# Patient Record
Sex: MALE | Race: WHITE | Employment: OTHER | URBAN - METROPOLITAN AREA
[De-identification: names, ages, dates, MRNs, and addresses within clinical notes are randomized per-mention and may not be internally consistent; named-entity substitution may affect disease eponyms.]

---

## 2024-01-01 ENCOUNTER — OUTSIDE SERVICES (OUTPATIENT)
Dept: INTERNAL MEDICINE CLINIC | Age: 74
End: 2024-01-01
Payer: OTHER GOVERNMENT

## 2024-01-01 DIAGNOSIS — E78.5 HYPERLIPIDEMIA, UNSPECIFIED HYPERLIPIDEMIA TYPE: ICD-10-CM

## 2024-01-01 DIAGNOSIS — N18.6 END STAGE RENAL DISEASE (HCC): ICD-10-CM

## 2024-01-01 DIAGNOSIS — F41.9 ANXIETY DISORDER, UNSPECIFIED TYPE: ICD-10-CM

## 2024-01-01 DIAGNOSIS — I25.5 ISCHEMIC CARDIOMYOPATHY: ICD-10-CM

## 2024-01-01 DIAGNOSIS — I47.0 RE-ENTRY VENTRICULAR ARRHYTHMIA (HCC): Primary | ICD-10-CM

## 2024-01-01 DIAGNOSIS — J44.9 CHRONIC OBSTRUCTIVE PULMONARY DISEASE, UNSPECIFIED COPD TYPE (HCC): ICD-10-CM

## 2024-01-01 DIAGNOSIS — D64.9 ANEMIA, UNSPECIFIED TYPE: ICD-10-CM

## 2024-01-01 DIAGNOSIS — I25.110 ATHSCL HEART DISEASE OF NATIVE COR ART W UNSTABLE ANG PCTRS (HCC): ICD-10-CM

## 2024-01-01 DIAGNOSIS — F33.1 MAJOR DEPRESSIVE DISORDER, RECURRENT, MODERATE (HCC): ICD-10-CM

## 2024-01-01 DIAGNOSIS — K21.9 GASTRO-ESOPHAGEAL REFLUX DISEASE WITHOUT ESOPHAGITIS: ICD-10-CM

## 2024-01-01 DIAGNOSIS — E11.8 TYPE 2 DIABETES MELLITUS WITH UNSPECIFIED COMPLICATIONS (HCC): ICD-10-CM

## 2024-01-01 PROCEDURE — 99305 1ST NF CARE MODERATE MDM 35: CPT | Performed by: INTERNAL MEDICINE

## 2024-08-17 ENCOUNTER — HOSPITAL ENCOUNTER (INPATIENT)
Age: 74
LOS: 1 days | Discharge: HOME | End: 2024-08-17
Attending: EMERGENCY MEDICINE | Admitting: INTERNAL MEDICINE
Payer: MEDICARE

## 2024-08-17 ENCOUNTER — APPOINTMENT (OUTPATIENT)
Dept: CT IMAGING | Age: 74
End: 2024-08-17
Payer: MEDICARE

## 2024-08-17 VITALS
SYSTOLIC BLOOD PRESSURE: 182 MMHG | TEMPERATURE: 98.7 F | HEART RATE: 103 BPM | WEIGHT: 140 LBS | DIASTOLIC BLOOD PRESSURE: 92 MMHG | RESPIRATION RATE: 23 BRPM | OXYGEN SATURATION: 100 %

## 2024-08-17 DIAGNOSIS — R79.89 ELEVATED TROPONIN: ICD-10-CM

## 2024-08-17 DIAGNOSIS — I50.9 ACUTE CONGESTIVE HEART FAILURE, UNSPECIFIED HEART FAILURE TYPE (HCC): Primary | ICD-10-CM

## 2024-08-17 DIAGNOSIS — R06.02 SHORTNESS OF BREATH: ICD-10-CM

## 2024-08-17 DIAGNOSIS — N17.9 ACUTE RENAL FAILURE, UNSPECIFIED ACUTE RENAL FAILURE TYPE (HCC): ICD-10-CM

## 2024-08-17 LAB
ANION GAP SERPL CALCULATED.3IONS-SCNC: 11 MMOL/L (ref 7–16)
BASOPHILS # BLD: 0.05 K/UL (ref 0–0.2)
BASOPHILS NFR BLD: 1 % (ref 0–2)
BNP SERPL-MCNC: ABNORMAL PG/ML (ref 0–450)
BUN SERPL-MCNC: 30 MG/DL (ref 6–23)
CALCIUM SERPL-MCNC: 9.2 MG/DL (ref 8.6–10.2)
CHLORIDE SERPL-SCNC: 104 MMOL/L (ref 98–107)
CO2 SERPL-SCNC: 23 MMOL/L (ref 22–29)
CREAT SERPL-MCNC: 4.4 MG/DL (ref 0.7–1.2)
D-DIMER QUANTITATIVE: 1717 NG/ML DDU (ref 0–230)
EOSINOPHIL # BLD: 0.21 K/UL (ref 0.05–0.5)
EOSINOPHILS RELATIVE PERCENT: 3 % (ref 0–6)
ERYTHROCYTE [DISTWIDTH] IN BLOOD BY AUTOMATED COUNT: 14.4 % (ref 11.5–15)
GFR, ESTIMATED: 13 ML/MIN/1.73M2
GLUCOSE SERPL-MCNC: 253 MG/DL (ref 74–99)
HCT VFR BLD AUTO: 42.9 % (ref 37–54)
HGB BLD-MCNC: 13.8 G/DL (ref 12.5–16.5)
IMM GRANULOCYTES # BLD AUTO: <0.03 K/UL (ref 0–0.58)
IMM GRANULOCYTES NFR BLD: 0 % (ref 0–5)
LYMPHOCYTES NFR BLD: 0.68 K/UL (ref 1.5–4)
LYMPHOCYTES RELATIVE PERCENT: 10 % (ref 20–42)
MCH RBC QN AUTO: 29.4 PG (ref 26–35)
MCHC RBC AUTO-ENTMCNC: 32.2 G/DL (ref 32–34.5)
MCV RBC AUTO: 91.5 FL (ref 80–99.9)
MONOCYTES NFR BLD: 0.41 K/UL (ref 0.1–0.95)
MONOCYTES NFR BLD: 6 % (ref 2–12)
NEUTROPHILS NFR BLD: 81 % (ref 43–80)
NEUTS SEG NFR BLD: 5.6 K/UL (ref 1.8–7.3)
PLATELET # BLD AUTO: 255 K/UL (ref 130–450)
PMV BLD AUTO: 9.4 FL (ref 7–12)
POTASSIUM SERPL-SCNC: 4.5 MMOL/L (ref 3.5–5)
RBC # BLD AUTO: 4.69 M/UL (ref 3.8–5.8)
SARS-COV-2 RDRP RESP QL NAA+PROBE: NOT DETECTED
SODIUM SERPL-SCNC: 138 MMOL/L (ref 132–146)
SPECIMEN DESCRIPTION: NORMAL
TROPONIN I SERPL HS-MCNC: 81 NG/L (ref 0–11)
TROPONIN I SERPL HS-MCNC: 90 NG/L (ref 0–11)
WBC OTHER # BLD: 7 K/UL (ref 4.5–11.5)

## 2024-08-17 PROCEDURE — 96360 HYDRATION IV INFUSION INIT: CPT

## 2024-08-17 PROCEDURE — 71275 CT ANGIOGRAPHY CHEST: CPT

## 2024-08-17 PROCEDURE — 85025 COMPLETE CBC W/AUTO DIFF WBC: CPT

## 2024-08-17 PROCEDURE — 80048 BASIC METABOLIC PNL TOTAL CA: CPT

## 2024-08-17 PROCEDURE — 1200000000 HC SEMI PRIVATE

## 2024-08-17 PROCEDURE — 99285 EMERGENCY DEPT VISIT HI MDM: CPT

## 2024-08-17 PROCEDURE — 96361 HYDRATE IV INFUSION ADD-ON: CPT

## 2024-08-17 PROCEDURE — 36415 COLL VENOUS BLD VENIPUNCTURE: CPT

## 2024-08-17 PROCEDURE — 6360000004 HC RX CONTRAST MEDICATION: Performed by: RADIOLOGY

## 2024-08-17 PROCEDURE — 84484 ASSAY OF TROPONIN QUANT: CPT

## 2024-08-17 PROCEDURE — 93005 ELECTROCARDIOGRAM TRACING: CPT | Performed by: EMERGENCY MEDICINE

## 2024-08-17 PROCEDURE — 85379 FIBRIN DEGRADATION QUANT: CPT

## 2024-08-17 PROCEDURE — 87635 SARS-COV-2 COVID-19 AMP PRB: CPT

## 2024-08-17 PROCEDURE — 83880 ASSAY OF NATRIURETIC PEPTIDE: CPT

## 2024-08-17 PROCEDURE — 2580000003 HC RX 258: Performed by: EMERGENCY MEDICINE

## 2024-08-17 RX ORDER — ACETAMINOPHEN 500 MG
500 TABLET ORAL EVERY 6 HOURS PRN
Status: CANCELLED | OUTPATIENT
Start: 2024-08-17

## 2024-08-17 RX ORDER — 0.9 % SODIUM CHLORIDE 0.9 %
1000 INTRAVENOUS SOLUTION INTRAVENOUS ONCE
Status: COMPLETED | OUTPATIENT
Start: 2024-08-17 | End: 2024-08-17

## 2024-08-17 RX ORDER — POLYETHYLENE GLYCOL 3350 17 G/17G
17 POWDER, FOR SOLUTION ORAL DAILY PRN
Status: CANCELLED | OUTPATIENT
Start: 2024-08-17

## 2024-08-17 RX ORDER — BUDESONIDE 0.5 MG/2ML
500 INHALANT ORAL
Status: CANCELLED | OUTPATIENT
Start: 2024-08-17

## 2024-08-17 RX ORDER — HEPARIN SODIUM 5000 [USP'U]/ML
5000 INJECTION, SOLUTION INTRAVENOUS; SUBCUTANEOUS EVERY 8 HOURS SCHEDULED
Status: CANCELLED | OUTPATIENT
Start: 2024-08-17

## 2024-08-17 RX ORDER — SODIUM CHLORIDE 9 MG/ML
INJECTION, SOLUTION INTRAVENOUS CONTINUOUS
Status: CANCELLED | OUTPATIENT
Start: 2024-08-17

## 2024-08-17 RX ORDER — IOPAMIDOL 755 MG/ML
75 INJECTION, SOLUTION INTRAVASCULAR
Status: COMPLETED | OUTPATIENT
Start: 2024-08-17 | End: 2024-08-17

## 2024-08-17 RX ORDER — PROCHLORPERAZINE EDISYLATE 5 MG/ML
10 INJECTION INTRAMUSCULAR; INTRAVENOUS EVERY 6 HOURS PRN
Status: CANCELLED | OUTPATIENT
Start: 2024-08-17

## 2024-08-17 RX ORDER — IPRATROPIUM BROMIDE AND ALBUTEROL SULFATE 2.5; .5 MG/3ML; MG/3ML
1 SOLUTION RESPIRATORY (INHALATION)
Status: CANCELLED | OUTPATIENT
Start: 2024-08-17

## 2024-08-17 RX ADMIN — IOPAMIDOL 75 ML: 755 INJECTION, SOLUTION INTRAVENOUS at 13:25

## 2024-08-17 RX ADMIN — SODIUM CHLORIDE 1000 ML: 9 INJECTION, SOLUTION INTRAVENOUS at 12:12

## 2024-08-17 ASSESSMENT — LIFESTYLE VARIABLES
HOW MANY STANDARD DRINKS CONTAINING ALCOHOL DO YOU HAVE ON A TYPICAL DAY: PATIENT DOES NOT DRINK
HOW OFTEN DO YOU HAVE A DRINK CONTAINING ALCOHOL: NEVER

## 2024-08-17 NOTE — ED NOTES
Name: Deion Knight  : 1950  MRN: [id number]    Date:  24    Time: 1:03 PM EDT    Benefits of immediately proceeding with Radiology exam(s) without pre-testing outweigh the risks or are not indicated as specified below and therefore the following is/are being waived:    [] Pregnancy test   [] Patients LMP on-time and regular.   [] Patient had Tubal Ligation or has other Contraception Device.   [] Patient  is Menopausal or Premenarcheal.    [] Patient had Full or Partial Hysterectomy.    [] Protocol for Iodine allergy    [] MRI Questionnaire     [x] BUN/Creatinine   [] Patient age w/no hx of renal dysfunction.   [] Patient on Dialysis.   [] Recent Normal Labs.  Electronically signed by Minoo Carvajal DO on 24 at 1:03 PM EDT               Minoo Carvajal DO  [phone]

## 2024-08-17 NOTE — ED PROVIDER NOTES
[facility] EMERGENCY DEPARTMENT  EMERGENCY DEPARTMENT ENCOUNTER        Pt Name: Deion Knight  MRN: [id number]  Birthdate 1950  Date of evaluation: 8/17/2024  Provider: Minoo Carvajal DO  PCP: No primary care provider on file.  Note Started: 2:26 PM EDT 8/17/24    CHIEF COMPLAINT       Chief Complaint   Patient presents with    Shortness of Breath     Pt complaint of SOB when he lies down. Also SOB upon exertion. Started getting worse approx 1 month ago.       HISTORY OF PRESENT ILLNESS: 1 or more Elements        Limitations to history : None    Deion Knight is a 74 y.o. male who presents for evaluation of shortness of breath that initially began a month ago.  He notes dyspnea on exertion as well as orthopnea.  Denies chest pain.  Denies peripheral edema.  Denies any nausea or vomiting.  Denies fever, chills or URI symptoms.      Nursing Notes were all reviewed and agreed with or any disagreements were addressed in the HPI.      REVIEW OF EXTERNAL NOTE :       [facility]      Chart Review/External Note Review    Last Echo reviewed by Me:  No results found for: \"LVEF\", \"LVEFMODE\"          Controlled Substance Monitoring:    Acute and Chronic Pain Monitoring:        No data to display                    REVIEW OF SYSTEMS :      Positives and Pertinent negatives as per HPI.     SURGICAL HISTORY   No past surgical history on file.    CURRENTMEDICATIONS       Previous Medications    No medications on file       ALLERGIES     Patient has no known allergies.    FAMILYHISTORY     No family history on file.     SOCIAL HISTORY          SCREENINGS        [place] Coma Scale  Eye Opening: Spontaneous  Best Verbal Response: Oriented  Best Motor Response: Obeys commands  Luis Coma Scale Score: 15                CIWA Assessment  BP: (!) 182/92  Pulse: (!) 103           PHYSICAL EXAM  1 or more Elements     ED Triage Vitals   BP Systolic BP Percentile Diastolic BP

## 2024-08-18 LAB
EKG ATRIAL RATE: 108 BPM
EKG P AXIS: 88 DEGREES
EKG P-R INTERVAL: 144 MS
EKG Q-T INTERVAL: 350 MS
EKG QRS DURATION: 96 MS
EKG QTC CALCULATION (BAZETT): 469 MS
EKG R AXIS: -4 DEGREES
EKG T AXIS: 131 DEGREES
EKG VENTRICULAR RATE: 108 BPM

## 2024-08-18 PROCEDURE — 93010 ELECTROCARDIOGRAM REPORT: CPT | Performed by: INTERNAL MEDICINE

## 2024-08-19 ENCOUNTER — HOSPITAL ENCOUNTER (INPATIENT)
Age: 74
LOS: 5 days | Discharge: LEFT BEFORE DISCHARGE | End: 2024-08-24
Attending: EMERGENCY MEDICINE | Admitting: INTERNAL MEDICINE
Payer: OTHER GOVERNMENT

## 2024-08-19 ENCOUNTER — APPOINTMENT (OUTPATIENT)
Dept: CT IMAGING | Age: 74
End: 2024-08-19
Payer: OTHER GOVERNMENT

## 2024-08-19 ENCOUNTER — APPOINTMENT (OUTPATIENT)
Dept: GENERAL RADIOLOGY | Age: 74
End: 2024-08-19
Payer: OTHER GOVERNMENT

## 2024-08-19 DIAGNOSIS — R06.02 SHORTNESS OF BREATH: Primary | ICD-10-CM

## 2024-08-19 DIAGNOSIS — N28.9 ACUTE RENAL INSUFFICIENCY: ICD-10-CM

## 2024-08-19 DIAGNOSIS — E87.5 HYPERKALEMIA: ICD-10-CM

## 2024-08-19 DIAGNOSIS — J90 PLEURAL EFFUSION, BILATERAL: ICD-10-CM

## 2024-08-19 PROBLEM — N17.9 ACUTE RENAL FAILURE (ARF) (HCC): Status: ACTIVE | Noted: 2024-08-19

## 2024-08-19 LAB
ANION GAP SERPL CALCULATED.3IONS-SCNC: 12 MMOL/L (ref 7–16)
BNP SERPL-MCNC: ABNORMAL PG/ML (ref 0–450)
BUN SERPL-MCNC: 29 MG/DL (ref 6–23)
CALCIUM SERPL-MCNC: 8.8 MG/DL (ref 8.6–10.2)
CHLORIDE SERPL-SCNC: 103 MMOL/L (ref 98–107)
CO2 SERPL-SCNC: 22 MMOL/L (ref 22–29)
CREAT SERPL-MCNC: 4.2 MG/DL (ref 0.7–1.2)
CRP SERPL HS-MCNC: 13 MG/L (ref 0–5)
EKG ATRIAL RATE: 97 BPM
EKG P AXIS: 82 DEGREES
EKG P-R INTERVAL: 140 MS
EKG Q-T INTERVAL: 350 MS
EKG QRS DURATION: 98 MS
EKG QTC CALCULATION (BAZETT): 444 MS
EKG R AXIS: 17 DEGREES
EKG T AXIS: -171 DEGREES
EKG VENTRICULAR RATE: 97 BPM
ERYTHROCYTE [DISTWIDTH] IN BLOOD BY AUTOMATED COUNT: 14.3 % (ref 11.5–15)
GFR, ESTIMATED: 14 ML/MIN/1.73M2
GLUCOSE BLD-MCNC: 141 MG/DL (ref 74–99)
GLUCOSE BLD-MCNC: 183 MG/DL (ref 74–99)
GLUCOSE SERPL-MCNC: 165 MG/DL (ref 74–99)
HBA1C MFR BLD: 7.4 % (ref 4–5.6)
HCT VFR BLD AUTO: 40.8 % (ref 37–54)
HGB BLD-MCNC: 13 G/DL (ref 12.5–16.5)
LACTATE BLDV-SCNC: 0.9 MMOL/L (ref 0.5–2.2)
MCH RBC QN AUTO: 29.7 PG (ref 26–35)
MCHC RBC AUTO-ENTMCNC: 31.9 G/DL (ref 32–34.5)
MCV RBC AUTO: 93.4 FL (ref 80–99.9)
PLATELET # BLD AUTO: 264 K/UL (ref 130–450)
PMV BLD AUTO: 9.9 FL (ref 7–12)
POTASSIUM SERPL-SCNC: 5.3 MMOL/L (ref 3.5–5)
PROCALCITONIN SERPL-MCNC: 0.12 NG/ML (ref 0–0.08)
RBC # BLD AUTO: 4.37 M/UL (ref 3.8–5.8)
SODIUM SERPL-SCNC: 137 MMOL/L (ref 132–146)
TROPONIN I SERPL HS-MCNC: 103 NG/L (ref 0–11)
WBC OTHER # BLD: 6.3 K/UL (ref 4.5–11.5)

## 2024-08-19 PROCEDURE — 74176 CT ABD & PELVIS W/O CONTRAST: CPT

## 2024-08-19 PROCEDURE — 86140 C-REACTIVE PROTEIN: CPT

## 2024-08-19 PROCEDURE — 6370000000 HC RX 637 (ALT 250 FOR IP): Performed by: INTERNAL MEDICINE

## 2024-08-19 PROCEDURE — 85027 COMPLETE CBC AUTOMATED: CPT

## 2024-08-19 PROCEDURE — 83036 HEMOGLOBIN GLYCOSYLATED A1C: CPT

## 2024-08-19 PROCEDURE — 1200000000 HC SEMI PRIVATE

## 2024-08-19 PROCEDURE — 82962 GLUCOSE BLOOD TEST: CPT

## 2024-08-19 PROCEDURE — 84145 PROCALCITONIN (PCT): CPT

## 2024-08-19 PROCEDURE — 80048 BASIC METABOLIC PNL TOTAL CA: CPT

## 2024-08-19 PROCEDURE — 93010 ELECTROCARDIOGRAM REPORT: CPT | Performed by: INTERNAL MEDICINE

## 2024-08-19 PROCEDURE — 6360000004 HC RX CONTRAST MEDICATION: Performed by: RADIOLOGY

## 2024-08-19 PROCEDURE — 6360000002 HC RX W HCPCS: Performed by: INTERNAL MEDICINE

## 2024-08-19 PROCEDURE — 93005 ELECTROCARDIOGRAM TRACING: CPT | Performed by: EMERGENCY MEDICINE

## 2024-08-19 PROCEDURE — 83605 ASSAY OF LACTIC ACID: CPT

## 2024-08-19 PROCEDURE — 71046 X-RAY EXAM CHEST 2 VIEWS: CPT

## 2024-08-19 PROCEDURE — 99285 EMERGENCY DEPT VISIT HI MDM: CPT

## 2024-08-19 PROCEDURE — 83880 ASSAY OF NATRIURETIC PEPTIDE: CPT

## 2024-08-19 PROCEDURE — 94640 AIRWAY INHALATION TREATMENT: CPT

## 2024-08-19 PROCEDURE — 84484 ASSAY OF TROPONIN QUANT: CPT

## 2024-08-19 PROCEDURE — 36415 COLL VENOUS BLD VENIPUNCTURE: CPT

## 2024-08-19 PROCEDURE — 2500000003 HC RX 250 WO HCPCS: Performed by: EMERGENCY MEDICINE

## 2024-08-19 RX ORDER — BUDESONIDE AND FORMOTEROL FUMARATE DIHYDRATE 80; 4.5 UG/1; UG/1
2 AEROSOL RESPIRATORY (INHALATION) 2 TIMES DAILY
COMMUNITY
End: 2024-08-19 | Stop reason: DRUGHIGH

## 2024-08-19 RX ORDER — GLUCAGON 1 MG/ML
1 KIT INJECTION PRN
Status: DISCONTINUED | OUTPATIENT
Start: 2024-08-19 | End: 2024-08-24 | Stop reason: HOSPADM

## 2024-08-19 RX ORDER — ALBUTEROL SULFATE 90 UG/1
2 AEROSOL, METERED RESPIRATORY (INHALATION) EVERY 6 HOURS PRN
Status: ON HOLD | COMMUNITY

## 2024-08-19 RX ORDER — METOPROLOL SUCCINATE 50 MG/1
50 TABLET, EXTENDED RELEASE ORAL DAILY
Status: ON HOLD | COMMUNITY

## 2024-08-19 RX ORDER — BUPROPION HYDROCHLORIDE 300 MG/1
300 TABLET ORAL EVERY MORNING
Status: ON HOLD | COMMUNITY

## 2024-08-19 RX ORDER — PANTOPRAZOLE SODIUM 40 MG/1
40 TABLET, DELAYED RELEASE ORAL DAILY
Status: ON HOLD | COMMUNITY

## 2024-08-19 RX ORDER — POLYETHYLENE GLYCOL 3350 17 G/17G
17 POWDER, FOR SOLUTION ORAL DAILY PRN
Status: DISCONTINUED | OUTPATIENT
Start: 2024-08-19 | End: 2024-08-24 | Stop reason: HOSPADM

## 2024-08-19 RX ORDER — ATORVASTATIN CALCIUM 40 MG/1
80 TABLET, FILM COATED ORAL NIGHTLY
Status: DISCONTINUED | OUTPATIENT
Start: 2024-08-19 | End: 2024-08-24 | Stop reason: HOSPADM

## 2024-08-19 RX ORDER — ALBUTEROL SULFATE 0.63 MG/3ML
1 SOLUTION RESPIRATORY (INHALATION) EVERY 6 HOURS PRN
COMMUNITY
End: 2024-08-19 | Stop reason: ALTCHOICE

## 2024-08-19 RX ORDER — BUDESONIDE AND FORMOTEROL FUMARATE DIHYDRATE 160; 4.5 UG/1; UG/1
1 AEROSOL RESPIRATORY (INHALATION) 2 TIMES DAILY
Status: ON HOLD | COMMUNITY

## 2024-08-19 RX ORDER — ASPIRIN 81 MG/1
81 TABLET ORAL DAILY
Status: ON HOLD | COMMUNITY

## 2024-08-19 RX ORDER — M-VIT,TX,IRON,MINS/CALC/FOLIC 27MG-0.4MG
1 TABLET ORAL DAILY
Status: ON HOLD | COMMUNITY

## 2024-08-19 RX ORDER — BUDESONIDE 0.5 MG/2ML
500 INHALANT ORAL
Status: DISCONTINUED | OUTPATIENT
Start: 2024-08-19 | End: 2024-08-24 | Stop reason: HOSPADM

## 2024-08-19 RX ORDER — HEPARIN SODIUM 5000 [USP'U]/ML
5000 INJECTION, SOLUTION INTRAVENOUS; SUBCUTANEOUS EVERY 8 HOURS SCHEDULED
Status: DISCONTINUED | OUTPATIENT
Start: 2024-08-19 | End: 2024-08-24 | Stop reason: HOSPADM

## 2024-08-19 RX ORDER — BUPROPION HYDROCHLORIDE 150 MG/1
300 TABLET ORAL EVERY MORNING
Status: DISCONTINUED | OUTPATIENT
Start: 2024-08-20 | End: 2024-08-24 | Stop reason: HOSPADM

## 2024-08-19 RX ORDER — ISOSORBIDE MONONITRATE 30 MG/1
30 TABLET, EXTENDED RELEASE ORAL DAILY
Status: DISCONTINUED | OUTPATIENT
Start: 2024-08-19 | End: 2024-08-24 | Stop reason: HOSPADM

## 2024-08-19 RX ORDER — DEXTROSE MONOHYDRATE 100 MG/ML
INJECTION, SOLUTION INTRAVENOUS CONTINUOUS PRN
Status: DISCONTINUED | OUTPATIENT
Start: 2024-08-19 | End: 2024-08-24 | Stop reason: HOSPADM

## 2024-08-19 RX ORDER — ACETAMINOPHEN 500 MG
500 TABLET ORAL EVERY 6 HOURS PRN
Status: DISCONTINUED | OUTPATIENT
Start: 2024-08-19 | End: 2024-08-24 | Stop reason: HOSPADM

## 2024-08-19 RX ORDER — INSULIN LISPRO 100 [IU]/ML
0-4 INJECTION, SOLUTION INTRAVENOUS; SUBCUTANEOUS NIGHTLY
Status: DISCONTINUED | OUTPATIENT
Start: 2024-08-19 | End: 2024-08-24 | Stop reason: HOSPADM

## 2024-08-19 RX ORDER — METOPROLOL SUCCINATE 50 MG/1
50 TABLET, EXTENDED RELEASE ORAL DAILY
Status: DISCONTINUED | OUTPATIENT
Start: 2024-08-19 | End: 2024-08-24 | Stop reason: HOSPADM

## 2024-08-19 RX ORDER — ATORVASTATIN CALCIUM 80 MG/1
80 TABLET, FILM COATED ORAL DAILY
Status: ON HOLD | COMMUNITY

## 2024-08-19 RX ORDER — INSULIN LISPRO 100 [IU]/ML
0-8 INJECTION, SOLUTION INTRAVENOUS; SUBCUTANEOUS
Status: DISCONTINUED | OUTPATIENT
Start: 2024-08-19 | End: 2024-08-24 | Stop reason: HOSPADM

## 2024-08-19 RX ORDER — ASPIRIN 81 MG/1
81 TABLET ORAL DAILY
Status: DISCONTINUED | OUTPATIENT
Start: 2024-08-19 | End: 2024-08-24 | Stop reason: HOSPADM

## 2024-08-19 RX ORDER — PROCHLORPERAZINE EDISYLATE 5 MG/ML
5 INJECTION INTRAMUSCULAR; INTRAVENOUS EVERY 6 HOURS PRN
Status: DISCONTINUED | OUTPATIENT
Start: 2024-08-19 | End: 2024-08-24 | Stop reason: HOSPADM

## 2024-08-19 RX ORDER — IPRATROPIUM BROMIDE AND ALBUTEROL SULFATE 2.5; .5 MG/3ML; MG/3ML
1 SOLUTION RESPIRATORY (INHALATION)
Status: DISCONTINUED | OUTPATIENT
Start: 2024-08-19 | End: 2024-08-24 | Stop reason: HOSPADM

## 2024-08-19 RX ORDER — EZETIMIBE 10 MG/1
10 TABLET ORAL DAILY
Status: ON HOLD | COMMUNITY

## 2024-08-19 RX ORDER — CALCIUM GLUCONATE 20 MG/ML
1000 INJECTION, SOLUTION INTRAVENOUS ONCE
Status: COMPLETED | OUTPATIENT
Start: 2024-08-19 | End: 2024-08-19

## 2024-08-19 RX ORDER — PANTOPRAZOLE SODIUM 40 MG/1
40 TABLET, DELAYED RELEASE ORAL
Status: DISCONTINUED | OUTPATIENT
Start: 2024-08-20 | End: 2024-08-24 | Stop reason: HOSPADM

## 2024-08-19 RX ORDER — ATORVASTATIN CALCIUM 40 MG/1
40 TABLET, FILM COATED ORAL NIGHTLY
Status: DISCONTINUED | OUTPATIENT
Start: 2024-08-19 | End: 2024-08-19

## 2024-08-19 RX ORDER — ISOSORBIDE MONONITRATE 30 MG/1
30 TABLET, EXTENDED RELEASE ORAL DAILY
Status: ON HOLD | COMMUNITY

## 2024-08-19 RX ADMIN — BUDESONIDE 500 MCG: 0.5 SUSPENSION RESPIRATORY (INHALATION) at 17:16

## 2024-08-19 RX ADMIN — ASPIRIN 81 MG: 81 TABLET, COATED ORAL at 17:03

## 2024-08-19 RX ADMIN — IOPAMIDOL 18 ML: 755 INJECTION, SOLUTION INTRAVENOUS at 18:50

## 2024-08-19 RX ADMIN — ATORVASTATIN CALCIUM 80 MG: 40 TABLET, FILM COATED ORAL at 21:47

## 2024-08-19 RX ADMIN — ISOSORBIDE MONONITRATE 30 MG: 30 TABLET, EXTENDED RELEASE ORAL at 21:47

## 2024-08-19 RX ADMIN — METOPROLOL SUCCINATE 50 MG: 50 TABLET, EXTENDED RELEASE ORAL at 21:47

## 2024-08-19 RX ADMIN — HEPARIN SODIUM 5000 UNITS: 5000 INJECTION INTRAVENOUS; SUBCUTANEOUS at 21:47

## 2024-08-19 RX ADMIN — IPRATROPIUM BROMIDE AND ALBUTEROL SULFATE 1 DOSE: .5; 2.5 SOLUTION RESPIRATORY (INHALATION) at 17:16

## 2024-08-19 RX ADMIN — CALCIUM GLUCONATE 1000 MG: 20 INJECTION, SOLUTION INTRAVENOUS at 15:41

## 2024-08-19 RX ADMIN — HEPARIN SODIUM 5000 UNITS: 5000 INJECTION INTRAVENOUS; SUBCUTANEOUS at 17:03

## 2024-08-19 ASSESSMENT — PAIN SCALES - GENERAL
PAINLEVEL_OUTOF10: 0
PAINLEVEL_OUTOF10: 0

## 2024-08-19 ASSESSMENT — ENCOUNTER SYMPTOMS
COUGH: 0
ABDOMINAL PAIN: 0
SHORTNESS OF BREATH: 1
CHEST TIGHTNESS: 0
WHEEZING: 0

## 2024-08-19 ASSESSMENT — PAIN - FUNCTIONAL ASSESSMENT: PAIN_FUNCTIONAL_ASSESSMENT: 0-10

## 2024-08-19 ASSESSMENT — LIFESTYLE VARIABLES: HOW OFTEN DO YOU HAVE A DRINK CONTAINING ALCOHOL: NEVER

## 2024-08-19 NOTE — ED PROVIDER NOTES
Patient presents emergency department with complaint of continued shortness of breath with exertion.  Patient was seen in the ED last week and was diagnosed with acute renal insufficiency and congestive heart failure.  Patient states that he cannot stay due to obligations at home and therefore left.  Today he states he has his affairs in order [facility].  Patient is agreeable with the plan.    The history is provided by the patient.       Review of Systems   Constitutional:  Positive for activity change and fatigue. Negative for chills and fever.   Respiratory:  Positive for shortness of breath. Negative for cough, chest tightness and wheezing.    Cardiovascular:  Negative for chest pain, palpitations and leg swelling.   Gastrointestinal:  Negative for abdominal pain.   Genitourinary:  Negative for flank pain.   Musculoskeletal:  Negative for arthralgias.   Neurological:  Negative for syncope and light-headedness.   Psychiatric/Behavioral: Negative.         Physical Exam  Vitals and nursing note reviewed.   Constitutional:       General: He is not in acute distress.     Appearance: He is well-developed and normal weight. He is not ill-appearing.   HENT:      Head: Normocephalic and atraumatic.   Eyes:      Pupils: Pupils are equal, round, and reactive to light.   Cardiovascular:      Rate and Rhythm: Normal rate and regular rhythm.      Heart sounds: Normal heart sounds. No murmur heard.  Pulmonary:      Effort: Pulmonary effort is normal. No respiratory distress.      Breath sounds: Normal breath sounds. No wheezing or rales.   Chest:      Chest wall: No tenderness.   Abdominal:      General: Bowel sounds are normal.      Palpations: Abdomen is soft.      Tenderness: There is no abdominal tenderness. There is no guarding or rebound.   Musculoskeletal:      Cervical back: Normal range of motion and neck supple.      Right lower leg: No tenderness. No edema.      Left

## 2024-08-19 NOTE — H&P
Department of Internal Medicine        CHIEF COMPLAINT:  + SOB with exertion, intermittent upper abdominal pain    Reason for Admission: Acute renal failure, CHF    HISTORY OF PRESENT ILLNESS:      The patient is a 74 y.o. male who presents with increased shortness of breath has been going on for about a month.  Patient was seen in the ED 2 days ago with the same symptoms of shortness of breath when he lays down and when he ambulates.  Patient said the symptoms going on for about a month.  He denies any chest pain or palpitations or dizziness.  He denies any fever/chills or productive cough.  He denies any history of heart problems.  Patient's BUN/creatinine and was elevated at 30/4.4 with.  No history of kidney disease in the past.  His proBNP was greater than 70,000.  Troponin was 81.  CBC was normal.  Patient random blood sugar though was also 253.  Patient signed out AMA but came back into the hospital because his [facility] physician[facility]nurse practitioner and told to come back in.  Patient states symptoms about the same with a WBC 6.3 and hemoglobin 13.0 today.  Pending repeat BMP today.  Temperature was 98 with a heart rate of 110 and blood pressure 94/78.  O2 sat 98% on room air at rest.  Chest x-ray was ordered and pending.  The patient did have a CTA of the lungs that on the 17th which showed no evidence of PE but did show bilateral pleural effusion.    Past Medical History:    History reviewed. No pertinent past medical history.  Past Surgical History:    History reviewed. No pertinent surgical history.    Medications Prior to Admission:    @  Prior to Admission medications    Medication Sig Start Date End Date Taking? Authorizing Provider   atorvastatin (LIPITOR) 40 MG tablet Take 1 tablet by mouth daily   Yes Debbie Mohan MD   albuterol (ACCUNEB) 0.63 MG/3ML nebulizer solution Take 3 mLs by nebulization every 6 hours as needed for Wheezing   Yes Debbie Mohan MD   budesonide-formoterol (BREYNA)

## 2024-08-19 NOTE — PROGRESS NOTES
4 Eyes Skin Assessment     NAME:  Deion Knight  YOB: 1950  MEDICAL RECORD NUMBER:  [id number]    The patient is being assessed for  Admission    I agree that at least one RN has performed a thorough Head to Toe Skin Assessment on the patient. ALL assessment sites listed below have been assessed.      Areas assessed by both nurses:    Head, Face, Ears, Shoulders, Back, Chest, Arms, Elbows, Hands, Sacrum. Buttock, Coccyx, Ischium, and Legs. Feet and Heels        Does the Patient have a Wound? No noted wound(s)       Conrad Prevention initiated by RN: Yes  Wound Care Orders initiated by RN: No    Pressure Injury (Stage 3,4, Unstageable, DTI, NWPT, and Complex wounds) if present, place Wound referral order by RN under : No    New Ostomies, if present place, Ostomy referral order under : No     Nurse 1 eSignature: Electronically signed by Hamlet Yi RN on 8/19/24 at 6:24 PM EDT    **SHARE this note so that the co-signing nurse can place an eSignature**    Nurse 2 eSignature: Electronically signed by SHA MURRAY RN on 8/19/24 at 6:41 PM EDT

## 2024-08-20 ENCOUNTER — APPOINTMENT (OUTPATIENT)
Age: 74
End: 2024-08-20
Attending: INTERNAL MEDICINE
Payer: OTHER GOVERNMENT

## 2024-08-20 LAB
ALBUMIN SERPL-MCNC: 2.4 G/DL (ref 3.5–5.2)
ALP SERPL-CCNC: 93 U/L (ref 40–129)
ALT SERPL-CCNC: 16 U/L (ref 0–40)
ANION GAP SERPL CALCULATED.3IONS-SCNC: 10 MMOL/L (ref 7–16)
AST SERPL-CCNC: 25 U/L (ref 0–39)
BASOPHILS # BLD: 0.04 K/UL (ref 0–0.2)
BASOPHILS NFR BLD: 1 % (ref 0–2)
BILIRUB SERPL-MCNC: 0.3 MG/DL (ref 0–1.2)
BUN SERPL-MCNC: 29 MG/DL (ref 6–23)
CALCIUM SERPL-MCNC: 8.4 MG/DL (ref 8.6–10.2)
CHLORIDE SERPL-SCNC: 107 MMOL/L (ref 98–107)
CHOLEST SERPL-MCNC: 183 MG/DL
CO2 SERPL-SCNC: 21 MMOL/L (ref 22–29)
CREAT SERPL-MCNC: 4.3 MG/DL (ref 0.7–1.2)
CREAT UR-MCNC: 112 MG/DL (ref 40–278)
ECHO AR MAX VEL PISA: 3.1 M/S
ECHO AV AREA PEAK VELOCITY: 1.3 CM2
ECHO AV AREA PEAK VELOCITY: 1.4 CM2
ECHO AV AREA PEAK VELOCITY: 1.7 CM2
ECHO AV AREA PEAK VELOCITY: 1.9 CM2
ECHO AV AREA VTI: 1.5 CM2
ECHO AV AREA/BSA VTI: 0.9 CM2/M2
ECHO AV CUSP MM: 1.7 CM
ECHO AV MEAN GRADIENT: 5 MMHG
ECHO AV MEAN VELOCITY: 1.1 M/S
ECHO AV PEAK GRADIENT: 11 MMHG
ECHO AV PEAK GRADIENT: 11 MMHG
ECHO AV PEAK VELOCITY: 1.5 M/S
ECHO AV PEAK VELOCITY: 1.6 M/S
ECHO AV REGURGITANT PHT: 1034.8 MILLISECOND
ECHO AV VTI: 35.8 CM
ECHO BSA: 1.75 M2
ECHO EST RA PRESSURE: 3 MMHG
ECHO LA DIAMETER INDEX: 2.05 CM/M2
ECHO LA DIAMETER: 3.6 CM
ECHO LA VOL A-L A2C: 84 ML (ref 18–58)
ECHO LA VOL A-L A4C: 66 ML (ref 18–58)
ECHO LA VOL BP: 75 ML (ref 18–58)
ECHO LA VOL MOD A2C: 78 ML (ref 18–58)
ECHO LA VOL MOD A4C: 62 ML (ref 18–58)
ECHO LA VOL/BSA BIPLANE: 43 ML/M2 (ref 16–34)
ECHO LA VOLUME AREA LENGTH: 81 ML
ECHO LA VOLUME INDEX A-L A2C: 48 ML/M2 (ref 16–34)
ECHO LA VOLUME INDEX A-L A4C: 38 ML/M2 (ref 16–34)
ECHO LA VOLUME INDEX AREA LENGTH: 46 ML/M2 (ref 16–34)
ECHO LA VOLUME INDEX MOD A2C: 44 ML/M2 (ref 16–34)
ECHO LA VOLUME INDEX MOD A4C: 35 ML/M2 (ref 16–34)
ECHO LV EDV A2C: 165 ML
ECHO LV EDV A4C: 176 ML
ECHO LV EDV BP: 174 ML (ref 67–155)
ECHO LV EDV INDEX A4C: 100 ML/M2
ECHO LV EDV INDEX BP: 99 ML/M2
ECHO LV EDV NDEX A2C: 94 ML/M2
ECHO LV EF PHYSICIAN: 15 %
ECHO LV EJECTION FRACTION A2C: 20 %
ECHO LV EJECTION FRACTION A4C: 29 %
ECHO LV EJECTION FRACTION BIPLANE: 25 % (ref 55–100)
ECHO LV ESV A2C: 132 ML
ECHO LV ESV A4C: 125 ML
ECHO LV ESV BP: 131 ML (ref 22–58)
ECHO LV ESV INDEX A2C: 75 ML/M2
ECHO LV ESV INDEX A4C: 71 ML/M2
ECHO LV ESV INDEX BP: 74 ML/M2
ECHO LV FRACTIONAL SHORTENING: 9 % (ref 28–44)
ECHO LV INTERNAL DIMENSION DIASTOLE INDEX: 3.64 CM/M2
ECHO LV INTERNAL DIMENSION DIASTOLIC: 6.4 CM (ref 4.2–5.9)
ECHO LV INTERNAL DIMENSION SYSTOLIC INDEX: 3.3 CM/M2
ECHO LV INTERNAL DIMENSION SYSTOLIC: 5.8 CM
ECHO LV IVSD: 1.1 CM (ref 0.6–1)
ECHO LV IVSS: 1.2 CM
ECHO LV MASS 2D: 330.4 G (ref 88–224)
ECHO LV MASS INDEX 2D: 187.7 G/M2 (ref 49–115)
ECHO LV POSTERIOR WALL DIASTOLIC: 1.2 CM (ref 0.6–1)
ECHO LV POSTERIOR WALL SYSTOLIC: 1.4 CM
ECHO LV RELATIVE WALL THICKNESS RATIO: 0.38
ECHO LVOT AREA: 3.1 CM2
ECHO LVOT AV VTI INDEX: 0.47
ECHO LVOT DIAM: 2 CM
ECHO LVOT MEAN GRADIENT: 1 MMHG
ECHO LVOT PEAK GRADIENT: 3 MMHG
ECHO LVOT PEAK GRADIENT: 3 MMHG
ECHO LVOT PEAK VELOCITY: 0.7 M/S
ECHO LVOT PEAK VELOCITY: 0.9 M/S
ECHO LVOT STROKE VOLUME INDEX: 29.8 ML/M2
ECHO LVOT SV: 52.4 ML
ECHO LVOT VTI: 16.7 CM
ECHO MV "A" WAVE DURATION: 148.4 MSEC
ECHO MV A VELOCITY: 0.92 M/S
ECHO MV AREA PHT: 4.1 CM2
ECHO MV AREA VTI: 1.9 CM2
ECHO MV E DECELERATION TIME (DT): 168.2 MS
ECHO MV E VELOCITY: 0.79 M/S
ECHO MV E/A RATIO: 0.86
ECHO MV LVOT VTI INDEX: 1.67
ECHO MV MAX VELOCITY: 1 M/S
ECHO MV MEAN GRADIENT: 2 MMHG
ECHO MV MEAN VELOCITY: 0.6 M/S
ECHO MV PEAK GRADIENT: 4 MMHG
ECHO MV PRESSURE HALF TIME (PHT): 53.5 MS
ECHO MV VTI: 27.9 CM
ECHO PULMONARY ARTERY END DIASTOLIC PRESSURE: 5 MMHG
ECHO PV MAX VELOCITY: 0.9 M/S
ECHO PV MEAN GRADIENT: 2 MMHG
ECHO PV MEAN VELOCITY: 0.6 M/S
ECHO PV PEAK GRADIENT: 3 MMHG
ECHO PV REGURGITANT MAX VELOCITY: 1.1 M/S
ECHO PV VTI: 20.3 CM
ECHO PVEIN A DURATION: 186.5 MS
ECHO PVEIN A VELOCITY: 0.3 M/S
ECHO PVEIN PEAK D VELOCITY: 0.3 M/S
ECHO PVEIN PEAK S VELOCITY: 0.4 M/S
ECHO PVEIN S/D RATIO: 1.3
ECHO RIGHT VENTRICULAR SYSTOLIC PRESSURE (RVSP): 15 MMHG
ECHO RV INTERNAL DIMENSION: 3 CM
ECHO RV LONGITUDINAL DIMENSION: 6 CM
ECHO RV MID DIMENSION: 2.5 CM
ECHO RVOT MEAN GRADIENT: 1 MMHG
ECHO RVOT PEAK GRADIENT: 1 MMHG
ECHO RVOT PEAK VELOCITY: 0.6 M/S
ECHO RVOT VTI: 9.8 CM
ECHO TV REGURGITANT MAX VELOCITY: 1.75 M/S
ECHO TV REGURGITANT PEAK GRADIENT: 12 MMHG
EOSINOPHIL # BLD: 0.34 K/UL (ref 0.05–0.5)
EOSINOPHILS PERCENT, URINE: 1 % (ref 0–1)
EOSINOPHILS RELATIVE PERCENT: 6 % (ref 0–6)
ERYTHROCYTE [DISTWIDTH] IN BLOOD BY AUTOMATED COUNT: 14.2 % (ref 11.5–15)
GFR, ESTIMATED: 14 ML/MIN/1.73M2
GLUCOSE BLD-MCNC: 122 MG/DL (ref 74–99)
GLUCOSE BLD-MCNC: 188 MG/DL (ref 74–99)
GLUCOSE BLD-MCNC: 223 MG/DL (ref 74–99)
GLUCOSE BLD-MCNC: 228 MG/DL (ref 74–99)
GLUCOSE SERPL-MCNC: 93 MG/DL (ref 74–99)
HCT VFR BLD AUTO: 37.4 % (ref 37–54)
HDLC SERPL-MCNC: 70 MG/DL
HGB BLD-MCNC: 11.7 G/DL (ref 12.5–16.5)
IMM GRANULOCYTES # BLD AUTO: <0.03 K/UL (ref 0–0.58)
IMM GRANULOCYTES NFR BLD: 0 % (ref 0–5)
LDLC SERPL CALC-MCNC: 96 MG/DL
LEFT VENTRICULAR EJECTION FRACTION HIGH VALUE: 20 %
LEFT VENTRICULAR EJECTION FRACTION MODE: NORMAL
LV EF: 15 %
LYMPHOCYTES NFR BLD: 1.05 K/UL (ref 1.5–4)
LYMPHOCYTES RELATIVE PERCENT: 18 % (ref 20–42)
MAGNESIUM SERPL-MCNC: 1.8 MG/DL (ref 1.6–2.6)
MCH RBC QN AUTO: 29.1 PG (ref 26–35)
MCHC RBC AUTO-ENTMCNC: 31.3 G/DL (ref 32–34.5)
MCV RBC AUTO: 93 FL (ref 80–99.9)
MONOCYTES NFR BLD: 0.52 K/UL (ref 0.1–0.95)
MONOCYTES NFR BLD: 9 % (ref 2–12)
NEUTROPHILS NFR BLD: 66 % (ref 43–80)
NEUTS SEG NFR BLD: 3.77 K/UL (ref 1.8–7.3)
OSMOLALITY UR: 381 MOSM/KG (ref 300–900)
PHOSPHATE SERPL-MCNC: 4.1 MG/DL (ref 2.5–4.5)
PLATELET # BLD AUTO: 248 K/UL (ref 130–450)
PMV BLD AUTO: 10.6 FL (ref 7–12)
POTASSIUM SERPL-SCNC: 4.7 MMOL/L (ref 3.5–5)
PROT SERPL-MCNC: 5.1 G/DL (ref 6.4–8.3)
RBC # BLD AUTO: 4.02 M/UL (ref 3.8–5.8)
SODIUM SERPL-SCNC: 138 MMOL/L (ref 132–146)
SODIUM UR-SCNC: 35 MMOL/L
TRIGL SERPL-MCNC: 87 MG/DL
TSH SERPL DL<=0.05 MIU/L-ACNC: 3.83 UIU/ML (ref 0.27–4.2)
VLDLC SERPL CALC-MCNC: 17 MG/DL
WBC OTHER # BLD: 5.7 K/UL (ref 4.5–11.5)

## 2024-08-20 PROCEDURE — 84443 ASSAY THYROID STIM HORMONE: CPT

## 2024-08-20 PROCEDURE — 6370000000 HC RX 637 (ALT 250 FOR IP): Performed by: INTERNAL MEDICINE

## 2024-08-20 PROCEDURE — 1200000000 HC SEMI PRIVATE

## 2024-08-20 PROCEDURE — 94640 AIRWAY INHALATION TREATMENT: CPT

## 2024-08-20 PROCEDURE — 6360000002 HC RX W HCPCS: Performed by: INTERNAL MEDICINE

## 2024-08-20 PROCEDURE — 93306 TTE W/DOPPLER COMPLETE: CPT | Performed by: INTERNAL MEDICINE

## 2024-08-20 PROCEDURE — 80053 COMPREHEN METABOLIC PANEL: CPT

## 2024-08-20 PROCEDURE — 84100 ASSAY OF PHOSPHORUS: CPT

## 2024-08-20 PROCEDURE — 83735 ASSAY OF MAGNESIUM: CPT

## 2024-08-20 PROCEDURE — 82962 GLUCOSE BLOOD TEST: CPT

## 2024-08-20 PROCEDURE — 36415 COLL VENOUS BLD VENIPUNCTURE: CPT

## 2024-08-20 PROCEDURE — 83935 ASSAY OF URINE OSMOLALITY: CPT

## 2024-08-20 PROCEDURE — 82570 ASSAY OF URINE CREATININE: CPT

## 2024-08-20 PROCEDURE — 85025 COMPLETE CBC W/AUTO DIFF WBC: CPT

## 2024-08-20 PROCEDURE — 84300 ASSAY OF URINE SODIUM: CPT

## 2024-08-20 PROCEDURE — 87205 SMEAR GRAM STAIN: CPT

## 2024-08-20 PROCEDURE — 51798 US URINE CAPACITY MEASURE: CPT

## 2024-08-20 PROCEDURE — 93306 TTE W/DOPPLER COMPLETE: CPT

## 2024-08-20 PROCEDURE — 80061 LIPID PANEL: CPT

## 2024-08-20 RX ADMIN — ISOSORBIDE MONONITRATE 30 MG: 30 TABLET, EXTENDED RELEASE ORAL at 09:27

## 2024-08-20 RX ADMIN — BUDESONIDE 500 MCG: 0.5 SUSPENSION RESPIRATORY (INHALATION) at 18:55

## 2024-08-20 RX ADMIN — IPRATROPIUM BROMIDE AND ALBUTEROL SULFATE 1 DOSE: .5; 2.5 SOLUTION RESPIRATORY (INHALATION) at 18:55

## 2024-08-20 RX ADMIN — METOPROLOL SUCCINATE 50 MG: 50 TABLET, EXTENDED RELEASE ORAL at 09:27

## 2024-08-20 RX ADMIN — HEPARIN SODIUM 5000 UNITS: 5000 INJECTION INTRAVENOUS; SUBCUTANEOUS at 22:44

## 2024-08-20 RX ADMIN — HEPARIN SODIUM 5000 UNITS: 5000 INJECTION INTRAVENOUS; SUBCUTANEOUS at 05:12

## 2024-08-20 RX ADMIN — ATORVASTATIN CALCIUM 80 MG: 40 TABLET, FILM COATED ORAL at 22:44

## 2024-08-20 RX ADMIN — IPRATROPIUM BROMIDE AND ALBUTEROL SULFATE 1 DOSE: .5; 2.5 SOLUTION RESPIRATORY (INHALATION) at 09:17

## 2024-08-20 RX ADMIN — IPRATROPIUM BROMIDE AND ALBUTEROL SULFATE 1 DOSE: .5; 2.5 SOLUTION RESPIRATORY (INHALATION) at 13:54

## 2024-08-20 RX ADMIN — PANTOPRAZOLE SODIUM 40 MG: 40 TABLET, DELAYED RELEASE ORAL at 05:12

## 2024-08-20 RX ADMIN — BUDESONIDE 500 MCG: 0.5 SUSPENSION RESPIRATORY (INHALATION) at 04:43

## 2024-08-20 RX ADMIN — HEPARIN SODIUM 5000 UNITS: 5000 INJECTION INTRAVENOUS; SUBCUTANEOUS at 13:31

## 2024-08-20 RX ADMIN — IPRATROPIUM BROMIDE AND ALBUTEROL SULFATE 1 DOSE: .5; 2.5 SOLUTION RESPIRATORY (INHALATION) at 04:43

## 2024-08-20 RX ADMIN — ASPIRIN 81 MG: 81 TABLET, COATED ORAL at 09:27

## 2024-08-20 RX ADMIN — BUPROPION HYDROCHLORIDE 300 MG: 150 TABLET, EXTENDED RELEASE ORAL at 09:27

## 2024-08-20 NOTE — PROGRESS NOTES
Occupational Therapy    Room #:  0536/0536-01  Patient Name: Deion Knight  YOB: 1950  MRN: [id number]      Date of Service: 8/20/2024    Chart reviewed. Spoke with nursing. Patient  observed to be independent with ADLs and functional mobility with ADls.  No skilled Occupational Therapy needs at this time. Please re-order Occupational Therapy if there is a change in physical status and Occupational Therapy is needed. Thank you.     Juanpablo Siddiqi OTR/L; #[id number]

## 2024-08-20 NOTE — PLAN OF CARE
Problem: Discharge Planning  Goal: Discharge to home or other facility with appropriate resources  Outcome: Progressing  Flowsheets  Taken 8/19/2024 2000 by Natali Marc, RN  Discharge to home or other facility with appropriate resources: Identify barriers to discharge with patient and caregiver  Taken 8/19/2024 1630 by Deb Hines RN  Discharge to home or other facility with appropriate resources:   Identify barriers to discharge with patient and caregiver   Arrange for needed discharge resources and transportation as appropriate   Identify discharge learning needs (meds, wound care, etc)   Refer to discharge planning if patient needs post-hospital services based on physician order or complex needs related to functional status, cognitive ability or social support system     Problem: Pain  Goal: Verbalizes/displays adequate comfort level or baseline comfort level  Outcome: Progressing     Problem: ABCDS Injury Assessment  Goal: Absence of physical injury  Outcome: Progressing     Problem: Safety - Adult  Goal: Free from fall injury  Outcome: Progressing

## 2024-08-20 NOTE — PROGRESS NOTES
Department of Internal Medicine        CHIEF COMPLAINT:  + SOB with exertion, intermittent upper abdominal pain    Reason for Admission: Acute renal failure, CHF    HISTORY OF PRESENT ILLNESS:      The patient is a 74 y.o. male who presents with increased shortness of breath has been going on for about a month.  Patient was seen in the ED 2 days ago with the same symptoms of shortness of breath when he lays down and when he ambulates.  Patient said the symptoms going on for about a month.  He denies any chest pain or palpitations or dizziness.  He denies any fever/chills or productive cough.  He denies any history of heart problems.  Patient's BUN/creatinine and was elevated at 30/4.4 with.  No history of kidney disease in the past.  His proBNP was greater than 70,000.  Troponin was 81.  CBC was normal.  Patient random blood sugar though was also 253.  Patient signed out AMA but came back into the hospital because [facility] and told to come back in.  Patient states symptoms about the same with a WBC 6.3 and hemoglobin 13.0 today.  Pending repeat BMP today.  Temperature was 98 with a heart rate of 110 and blood pressure 94/78.  O2 sat 98% on room air at rest.  Chest x-ray was ordered and pending.  The patient did have a CTA of the lungs that on the 17th which showed no evidence of PE but did show bilateral pleural effusion.    8/20/2024  Patient seen examined on PCU.  Patient states he feels a lot better today.  Patient denies any current chest pain, abdominal pain, nausea or vomiting or unusual shortness of breath.  BUN/creatinine was 29/4.3.  Blood sugars range .  Hemoglobin A1c 7.4.  WBC 5.7 with hemoglobin 11.7.  Temperature 98.3 with heart rate 84 blood pressure 136/65.  O2 sat 94% on room air at rest.  CT abdomen pelvis yesterday showed no acute renal obstruction.  Possible constipation with moderate bladder distention.  Bladder scan today.  Pending echocardiogram  No results found for: \"PROTIME\", \"INR\"  Troponin:  No results found for: \"TROPONINI\"  U/A:  No results found for: \"NITRITE\", \"COLORU\", \"PROTEINU\", \"PHUR\", \"LABCAST\", \"WBCUA\", \"RBCUA\", \"MUCUS\", \"TRICHOMONAS\", \"YEAST\", \"BACTERIA\", \"CLARITYU\", \"SPECGRAV\", \"LEUKOCYTESUR\", \"UROBILINOGEN\", \"BILIRUBINUR\", \"BLOODU\", \"GLUCOSEU\", \"AMORPHOUS\"  ABG:  No results found for: \"PH\", \"PCO2\", \"PO2\", \"HCO3\", \"BE\", \"THGB\", \"TCO2\", \"O2SAT\"  HgBA1c:    Lab Results   Component Value Date/Time    LABA1C 7.4 08/19/2024 05:32 PM     FLP:    Lab Results   Component Value Date/Time    TRIG 87 08/20/2024 06:58 AM    HDL 70 08/20/2024 06:58 AM     TSH:    Lab Results   Component Value Date/Time    TSH 3.83 08/20/2024 06:58 AM     IRON:  No results found for: \"IRON\"  LIPASE:  No results found for: \"LIPASE\"    ASSESSMENT AND PLAN:      Patient Active Problem List    Diagnosis Date Noted    Acute renal failure (ARF) ([facility]) 08/19/2024    ANGIE (acute kidney injury) ([facility]) 08/17/2024     Impression:  Acute renal failure  Probable congestive heart failure with bilateral pleural effusions   Current tobacco abuse and probably underlying COPD  Hyperglycemia-random blood sugar greater than 250  Hyperlipidemia    Plan:  Patient admitted to monitored bed  Home medication reviewed  Activity up ad isrrael.  Hemoglobin A1c  Heparin 5000 units subcu every 8  Compazine 5 mg IV push every 6 hours as needed for nausea  Glucoscans 4 times daily with sliding scale insulin  General Diet with salt restriction  Accurate I's and O's  Echocardiogram  DuoNeb aerosols 4 times daily  Pulmicort aerosol twice daily  CT of the abdomen pelvis with oral contrast  Spot urine sodium  Spot urine creatinine  Urine for eosinophils    Bladder scan    CMP, CBC in a.m.      Mauro A Du, DO, D.O.  8/20/2024  10:42 AM

## 2024-08-20 NOTE — PROGRESS NOTES
Comprehensive Nutrition Assessment    Type and Reason for Visit:  Initial, Positive Nutrition Screen (MST 3)    Nutrition Recommendations/Plan:   Continue Current Diet (Regular, Low Na+)   Continue ONS (Diabetic) BID  Consult RD as needed     Malnutrition Assessment:  Malnutrition Status:  Insufficient data (08/20/24 1006)    Context:  Chronic Illness     Findings of the 6 clinical characteristics of malnutrition:  Energy Intake:  Mild decrease in energy intake (Comment) (poor po intake prior to admission 2/2 SOB)  Weight Loss:  Unable to assess     Body Fat Loss:  Mild body fat loss Orbital, Triceps   Muscle Mass Loss:  Mild muscle mass loss Temples (temporalis), Clavicles (pectoralis & deltoids), Calf (gastrocnemius)  Fluid Accumulation:  No significant fluid accumulation     Strength:  Not Performed    Nutrition Assessment:    Pt admit for SOB, acute renal failure, Bilateral Pleural Effusions, hyperkalemia. Pt was seen in ED last week w/ Renal Failure and CHF and left AMA. PMHx: not on file. Pt reports good appetite eating % of all meals and 26-50% of ONS. Continue Current diet, continue inpatient monitoring, f/up per policy.    Nutrition Related Findings:    A/O x4, I/O not recorded, K+ 5.3, elevated BUN/Creatinine, GFR 14, CRP 13.0, BNP 70,000, Troponin 103 Wound Type: None       Current Nutrition Intake & Therapies:    Average Meal Intake: %  Average Supplements Intake: 26-50%  ADULT DIET; Regular; No Added Salt (3-4 gm)  ADULT ORAL NUTRITION SUPPLEMENT; Breakfast, Lunch, Dinner; Diabetic Oral Supplement    Anthropometric Measures:  Height: 172.7 cm (5' 7.99\")  Ideal Body Weight (IBW): 154 lbs (70 kg)    Admission Body Weight: 63.5 kg (139 lb 15.9 oz)  Current Body Weight: 63.5 kg (139 lb 15.9 oz), 90.9 % IBW. Weight Source: Not Specified (08/20/24)  Current BMI (kg/m2): 21.3  Usual Body Weight:  (no wt hx in EMR)     Weight Adjustment For: No Adjustment        BMI Categories: Underweight (BMI

## 2024-08-20 NOTE — PROGRESS NOTES
Physical Therapy    Room #:  0536/0536-01  Patient Name: Deion Knight  YOB: 1950  MRN: [id number]      Date of Service: 8/20/2024    Chart reviewed. Spoke with nursing and patient. Patient  observed to be independent with bed mobility, transfers, stairs  and gait.  No skilled Physical Therapy needs at this time. Please re-order Physical Therapy if there is a change in physical status and Physical Therapy is needed. Thank you.     Juni Chow, PT

## 2024-08-21 ENCOUNTER — APPOINTMENT (OUTPATIENT)
Dept: ULTRASOUND IMAGING | Age: 74
End: 2024-08-21
Payer: OTHER GOVERNMENT

## 2024-08-21 PROBLEM — R79.89 ELEVATED TROPONIN: Status: ACTIVE | Noted: 2024-08-21

## 2024-08-21 PROBLEM — I50.9 ACUTE DECOMPENSATED HEART FAILURE (HCC): Status: ACTIVE | Noted: 2024-08-21

## 2024-08-21 PROBLEM — I42.8 NICM (NONISCHEMIC CARDIOMYOPATHY) (HCC): Status: ACTIVE | Noted: 2024-08-21

## 2024-08-21 PROBLEM — R06.02 SHORTNESS OF BREATH: Status: ACTIVE | Noted: 2024-08-21

## 2024-08-21 LAB
ALBUMIN SERPL-MCNC: 2.4 G/DL (ref 3.5–5.2)
ALP SERPL-CCNC: 93 U/L (ref 40–129)
ALT SERPL-CCNC: 17 U/L (ref 0–40)
ANION GAP SERPL CALCULATED.3IONS-SCNC: 9 MMOL/L (ref 7–16)
AST SERPL-CCNC: 23 U/L (ref 0–39)
BACTERIA URNS QL MICRO: ABNORMAL
BASOPHILS # BLD: 0.04 K/UL (ref 0–0.2)
BASOPHILS NFR BLD: 1 % (ref 0–2)
BILIRUB SERPL-MCNC: 0.2 MG/DL (ref 0–1.2)
BILIRUB UR QL STRIP: NEGATIVE
BNP SERPL-MCNC: ABNORMAL PG/ML (ref 0–450)
BUN SERPL-MCNC: 35 MG/DL (ref 6–23)
CALCIUM SERPL-MCNC: 8.2 MG/DL (ref 8.6–10.2)
CHLORIDE SERPL-SCNC: 106 MMOL/L (ref 98–107)
CHLORIDE UR-SCNC: 27 MMOL/L
CLARITY UR: CLEAR
CO2 SERPL-SCNC: 21 MMOL/L (ref 22–29)
COLOR UR: YELLOW
CREAT SERPL-MCNC: 4.8 MG/DL (ref 0.7–1.2)
CREAT UR-MCNC: 57.9 MG/DL (ref 40–278)
EOSINOPHIL # BLD: 0.29 K/UL (ref 0.05–0.5)
EOSINOPHILS PERCENT, URINE: 1 % (ref 0–1)
EOSINOPHILS RELATIVE PERCENT: 5 % (ref 0–6)
EPI CELLS #/AREA URNS HPF: ABNORMAL /HPF
ERYTHROCYTE [DISTWIDTH] IN BLOOD BY AUTOMATED COUNT: 14.4 % (ref 11.5–15)
GFR, ESTIMATED: 12 ML/MIN/1.73M2
GLUCOSE BLD-MCNC: 134 MG/DL (ref 74–99)
GLUCOSE BLD-MCNC: 152 MG/DL (ref 74–99)
GLUCOSE BLD-MCNC: 205 MG/DL (ref 74–99)
GLUCOSE BLD-MCNC: 258 MG/DL (ref 74–99)
GLUCOSE SERPL-MCNC: 151 MG/DL (ref 74–99)
GLUCOSE UR STRIP-MCNC: 500 MG/DL
HCT VFR BLD AUTO: 35.8 % (ref 37–54)
HGB BLD-MCNC: 11.3 G/DL (ref 12.5–16.5)
HGB UR QL STRIP.AUTO: ABNORMAL
IMM GRANULOCYTES # BLD AUTO: <0.03 K/UL (ref 0–0.58)
IMM GRANULOCYTES NFR BLD: 0 % (ref 0–5)
KETONES UR STRIP-MCNC: NEGATIVE MG/DL
LEUKOCYTE ESTERASE UR QL STRIP: ABNORMAL
LYMPHOCYTES NFR BLD: 1.03 K/UL (ref 1.5–4)
LYMPHOCYTES RELATIVE PERCENT: 19 % (ref 20–42)
MCH RBC QN AUTO: 29.6 PG (ref 26–35)
MCHC RBC AUTO-ENTMCNC: 31.6 G/DL (ref 32–34.5)
MCV RBC AUTO: 93.7 FL (ref 80–99.9)
MONOCYTES NFR BLD: 0.43 K/UL (ref 0.1–0.95)
MONOCYTES NFR BLD: 8 % (ref 2–12)
NEUTROPHILS NFR BLD: 67 % (ref 43–80)
NEUTS SEG NFR BLD: 3.57 K/UL (ref 1.8–7.3)
NITRITE UR QL STRIP: NEGATIVE
PH UR STRIP: 6 [PH] (ref 5–9)
PLATELET # BLD AUTO: 234 K/UL (ref 130–450)
PMV BLD AUTO: 10.4 FL (ref 7–12)
POTASSIUM SERPL-SCNC: 4.3 MMOL/L (ref 3.5–5)
PROT SERPL-MCNC: 5.4 G/DL (ref 6.4–8.3)
PROT UR STRIP-MCNC: >=300 MG/DL
RBC # BLD AUTO: 3.82 M/UL (ref 3.8–5.8)
RBC #/AREA URNS HPF: ABNORMAL /HPF
SODIUM SERPL-SCNC: 136 MMOL/L (ref 132–146)
SODIUM UR-SCNC: 29 MMOL/L
SP GR UR STRIP: 1.01 (ref 1–1.03)
UROBILINOGEN UR STRIP-ACNC: 0.2 EU/DL (ref 0–1)
UUN UR-MCNC: 249 MG/DL (ref 800–1666)
WBC #/AREA URNS HPF: ABNORMAL /HPF
WBC OTHER # BLD: 5.4 K/UL (ref 4.5–11.5)

## 2024-08-21 PROCEDURE — 94640 AIRWAY INHALATION TREATMENT: CPT

## 2024-08-21 PROCEDURE — 80053 COMPREHEN METABOLIC PANEL: CPT

## 2024-08-21 PROCEDURE — 83880 ASSAY OF NATRIURETIC PEPTIDE: CPT

## 2024-08-21 PROCEDURE — 2580000003 HC RX 258: Performed by: INTERNAL MEDICINE

## 2024-08-21 PROCEDURE — 36415 COLL VENOUS BLD VENIPUNCTURE: CPT

## 2024-08-21 PROCEDURE — 6360000002 HC RX W HCPCS: Performed by: INTERNAL MEDICINE

## 2024-08-21 PROCEDURE — APPSS180 APP SPLIT SHARED TIME > 60 MINUTES: Performed by: NURSE PRACTITIONER

## 2024-08-21 PROCEDURE — 6370000000 HC RX 637 (ALT 250 FOR IP): Performed by: INTERNAL MEDICINE

## 2024-08-21 PROCEDURE — 84540 ASSAY OF URINE/UREA-N: CPT

## 2024-08-21 PROCEDURE — 76775 US EXAM ABDO BACK WALL LIM: CPT | Performed by: INTERNAL MEDICINE

## 2024-08-21 PROCEDURE — 81001 URINALYSIS AUTO W/SCOPE: CPT

## 2024-08-21 PROCEDURE — 82570 ASSAY OF URINE CREATININE: CPT

## 2024-08-21 PROCEDURE — 99223 1ST HOSP IP/OBS HIGH 75: CPT | Performed by: INTERNAL MEDICINE

## 2024-08-21 PROCEDURE — 82436 ASSAY OF URINE CHLORIDE: CPT

## 2024-08-21 PROCEDURE — 1200000000 HC SEMI PRIVATE

## 2024-08-21 PROCEDURE — 85025 COMPLETE CBC W/AUTO DIFF WBC: CPT

## 2024-08-21 PROCEDURE — 84300 ASSAY OF URINE SODIUM: CPT

## 2024-08-21 PROCEDURE — 87205 SMEAR GRAM STAIN: CPT

## 2024-08-21 PROCEDURE — 82962 GLUCOSE BLOOD TEST: CPT

## 2024-08-21 PROCEDURE — 76770 US EXAM ABDO BACK WALL COMP: CPT

## 2024-08-21 RX ORDER — SODIUM CHLORIDE 9 MG/ML
INJECTION, SOLUTION INTRAVENOUS CONTINUOUS
Status: DISCONTINUED | OUTPATIENT
Start: 2024-08-21 | End: 2024-08-24 | Stop reason: HOSPADM

## 2024-08-21 RX ADMIN — SODIUM CHLORIDE: 9 INJECTION, SOLUTION INTRAVENOUS at 15:57

## 2024-08-21 RX ADMIN — HEPARIN SODIUM 5000 UNITS: 5000 INJECTION INTRAVENOUS; SUBCUTANEOUS at 06:16

## 2024-08-21 RX ADMIN — IPRATROPIUM BROMIDE AND ALBUTEROL SULFATE 1 DOSE: .5; 2.5 SOLUTION RESPIRATORY (INHALATION) at 05:20

## 2024-08-21 RX ADMIN — ASPIRIN 81 MG: 81 TABLET, COATED ORAL at 09:35

## 2024-08-21 RX ADMIN — PANTOPRAZOLE SODIUM 40 MG: 40 TABLET, DELAYED RELEASE ORAL at 06:16

## 2024-08-21 RX ADMIN — BUDESONIDE 500 MCG: 0.5 SUSPENSION RESPIRATORY (INHALATION) at 05:20

## 2024-08-21 RX ADMIN — ISOSORBIDE MONONITRATE 30 MG: 30 TABLET, EXTENDED RELEASE ORAL at 09:35

## 2024-08-21 RX ADMIN — INSULIN LISPRO 2 UNITS: 100 INJECTION, SOLUTION INTRAVENOUS; SUBCUTANEOUS at 11:40

## 2024-08-21 RX ADMIN — ATORVASTATIN CALCIUM 80 MG: 40 TABLET, FILM COATED ORAL at 21:35

## 2024-08-21 RX ADMIN — IPRATROPIUM BROMIDE AND ALBUTEROL SULFATE 1 DOSE: .5; 2.5 SOLUTION RESPIRATORY (INHALATION) at 16:19

## 2024-08-21 RX ADMIN — BUDESONIDE 500 MCG: 0.5 SUSPENSION RESPIRATORY (INHALATION) at 16:19

## 2024-08-21 RX ADMIN — EMPAGLIFLOZIN 10 MG: 10 TABLET, FILM COATED ORAL at 11:09

## 2024-08-21 RX ADMIN — IPRATROPIUM BROMIDE AND ALBUTEROL SULFATE 1 DOSE: .5; 2.5 SOLUTION RESPIRATORY (INHALATION) at 09:41

## 2024-08-21 RX ADMIN — HEPARIN SODIUM 5000 UNITS: 5000 INJECTION INTRAVENOUS; SUBCUTANEOUS at 14:08

## 2024-08-21 RX ADMIN — METOPROLOL SUCCINATE 50 MG: 50 TABLET, EXTENDED RELEASE ORAL at 09:35

## 2024-08-21 RX ADMIN — HEPARIN SODIUM 5000 UNITS: 5000 INJECTION INTRAVENOUS; SUBCUTANEOUS at 21:35

## 2024-08-21 RX ADMIN — BUPROPION HYDROCHLORIDE 300 MG: 150 TABLET, EXTENDED RELEASE ORAL at 09:35

## 2024-08-21 ASSESSMENT — PULMONARY FUNCTION TESTS: PEFR_L/MIN: 18

## 2024-08-21 NOTE — CONSULTS
Patient seen and examined.    Consult dictated.    Patient is a 74-year-old gentleman- n with unknown baseline serum creatinine.  Patient now with acute kidney injury which may be partly exacerbated by use of intravenous contrast.  We'll check urine studies.  Start the patient on gentle hydration keeping in mind his elevated proBNP.            Dr. Du, Mauro YAO DO  , Thank You for allowing me to participate in the care of this patient.  Will follow the patient with you.    Gómez Hammonds MD  Nephrology    Electronically signed by Gómez Hammonds MD on 8/21/2024 at 3:14 PM

## 2024-08-21 NOTE — CONSULTS
will check an imaging study with renal ultrasound.  7. Chronic kidney disease with unknown baseline serum creatinine.    PLAN:  Plan is to check urine for sodium, creatinine, urea and eosinophils.  Check an imaging study with renal ultrasound.  Follow renal function closely.  Rest of plans per orders.    Thank you for allowing me to participate in the care of your patient.  We will follow the patient with you.          HENRY ARREOLA MD      D:  08/21/2024 15:13:48     T:  08/21/2024 18:16:45     ELOISA/DEMAR  Job #:  [id number]     Doc#:  [id number]

## 2024-08-21 NOTE — PLAN OF CARE
Problem: Discharge Planning  Goal: Discharge to home or other facility with appropriate resources  8/21/2024 1637 by Janeen Batres, RN  Outcome: Progressing     Problem: Pain  Goal: Verbalizes/displays adequate comfort level or baseline comfort level  8/21/2024 1637 by Janeen Batres, RN  Outcome: Progressing     Problem: ABCDS Injury Assessment  Goal: Absence of physical injury  8/21/2024 1637 by Janeen Batres, RN  Outcome: Progressing

## 2024-08-21 NOTE — CONSULTS
Inpatient Cardiology Consultation      Reason for Consult: Severe systolic cardiomyopathy    Consulting Physician: Dr. Juaerz    Requesting Physician: Dr. Du    Date of Consultation: 8/21/2024    HISTORY OF PRESENT ILLNESS: 74 y.o.  male[facility]  8/17/2024 presented to the ED for evaluation of 1 month history of shortness of breath, dyspnea with exertion, and orthopnea, without chest pain, peripheral edema, nausea, vomiting, fever, chills, or URI symptoms.  Lab test notes troponin 90> 80, P-BNP >70,000, D-dimer 1717, creatinine 4.4, CTA of the chest showed bilateral pleural effusion.  Patient signed out AMA despite being told he is in acute renal failure, congestive heart failure, with pleural effusions.  He noted he needed to get his \"life in order\", then he would come back.    Presented to the ED 8/18/2024 with complaints of 1 month history of continued shortness of breath, dyspnea with exertion, and orthopnea.    Patient reports that he came bask to the ED for continued symptoms, SOB, \"felt like I was suffocating\", with associated abdm pain, right sided chest pain, Dizziness, lightheadedness, and orthopnea. Symptoms have been ongoing for a month, progressively worsening. Denies any palpitations, arm pain, nausea, vomiting, fever, or chills.     Has a walker and a cane that he uses occasionally to assist with ambulation. Lives alone in a trailer.  Sleeps in a bed with 1 pillow. Denies any history of TAMMIE or PND     [facility] 8/19/2024: BP 94/78, , afebrile, O2 sat 98% on RA     Troponin 103>, P-BNP >70,000, K+ 5.3>4.7, BUN/CR 29/4.2>29/4.3, AST 25, WBC 6.3>5.7, H/H 13.0/40.8>11.7/37.4, >248, TSH 3.83, Mag 1.8, Phos 4.1, Lactic acid 0.9, CRP 13.0, Procalcitonin 0.12, HGB A1C 7.4%     CXR 8/19/2024: There are some increase in the AP diameter of the chest which can indicate emphysematous changes. Heart is normal size. There  There is no perihilar vascular congestion. There is no pneumothorax.   Findings CT can indicated volume overload/decompensated congestive heart failure.  Please correlate clinically.     CT ABDOMEN PELVIS WO CONTRAST 8/19/2024: No acute renal obstruction. Left renal calculus.  Prominent perinephric stranding/fluid that may correlate with acute renal failure. Renal cysts and indeterminate lesions.  Recommend pre and post-contrast MRI or CT for enhancement assessment, as clinically warranted. Moderate appearing pleural effusions on incomplete assessment. Adjacent atelectasis mildly and possible edema. Severe atherosclerotic calcifications present. Possible constipation. Moderate bladder distension, borderline prostate enlargement.     8/20/2024 TTE (Dr. Vick):     Left Ventricle: Severely reduced left ventricular systolic function with a visually estimated EF of 15 - 20%. Left ventricle is mildly dilated. Findings consistent with severe eccentric hypertrophy. Severe global hypokinesis present. Grade II diastolic dysfunction with increased LAP.     Right Ventricle: Right ventricle size is normal. Normal systolic function.     Aortic Valve: Mild sclerosis of the aortic valve cusps. Mild regurgitation.     Mitral Valve: Mild annular calcification. Mild regurgitation.     Tricuspid Valve: Unable to assess RVSP.     Left Atrium: Left atrium is moderately dilated.     Right Atrium: Right atrium size is normal.     Pericardium: Trivial pericardial effusion present. No indication of cardiac tamponade.     Image quality is adequate.       Intake/Output Summary (Last 24 hours) at 8/21/2024 0822  Last data filed at 8/20/2024 1112  Gross per 24 hour   Intake --   Output 84 ml   Net -84 ml       Labs:   CBC:   Recent Labs     08/19/24  1235 08/20/24  0658   WBC 6.3 5.7   HGB 13.0 11.7*   HCT 40.8 37.4    248     BMP:   Recent Labs     08/19/24  1235 08/20/24  0658    138   K 5.3* 4.7   CO2 22 21*   BUN 29* 29*

## 2024-08-21 NOTE — CARE COORDINATION
Case Management Assessment  Initial Evaluation    Date/Time of Evaluation: 8/21/2024 12:42 PM  Assessment Completed by: Rosa Arora RN    If patient is discharged prior to next notation, then this note serves as note for discharge by case management.    Patient Name: Deion Knight                   YOB: 1950  Diagnosis: Shortness of breath [R06.02]  Hyperkalemia [E87.5]  Acute renal insufficiency [N28.9]  Acute renal failure (ARF) (HCC) [N17.9]  Pleural effusion, bilateral [J90]                   Date / Time: 8/19/2024 12:07 PM    Patient Admission Status: Inpatient   Readmission Risk (Low < 19, Mod (19-27), High > 27): Readmission Risk Score: 16.7    Current PCP: No primary care provider on file.  PCP verified by CM? Yes    Chart Reviewed: Yes      History Provided by: Patient  Patient Orientation: Alert and Oriented    Patient Cognition: Alert    Hospitalization in the last 30 days (Readmission):  Yes    If yes, Readmission Assessment in CM Navigator will be completed.    Advance Directives:      Code Status: Not on file   Patient's Primary Decision Maker is: Patient Declined (Legal Next of Kin Remains as Decision Maker)      Discharge Planning:    Patient lives with: Alone Type of Home: Trailer/Mobile Home  Primary Care Giver: Self  Patient Support Systems include: Friends/Neighbors, Meals on Wheels (food bank)   Current Financial resources:    Current community resources:    Current services prior to admission: Other (Comment) (cane, walker at home does not use. USes shower chair)            Current DME:              Type of Home Care services:  None    ADLS  Prior functional level: Independent in ADLs/IADLs  Current functional level: Independent in ADLs/IADLs    PT AM-PAC:   /24  OT AM-PAC:   /24    Family can provide assistance at DC: No  Would you like Case Management to discuss the discharge plan with any other family members/significant others, and if so, who? No  Plans to Return to Present

## 2024-08-21 NOTE — PROGRESS NOTES
Department of Internal Medicine        CHIEF COMPLAINT:  + SOB with exertion, intermittent upper abdominal pain    Reason for Admission: Acute renal failure, CHF    HISTORY OF PRESENT ILLNESS:      The patient is a 74 y.o. male who presents with increased shortness of breath has been going on for about a month.  Patient was seen in the ED 2 days ago with the same symptoms of shortness of breath when he lays down and when he ambulates.  Patient said the symptoms going on for about a month.  He denies any chest pain or palpitations or dizziness.  He denies any fever/chills or productive cough.  He denies any history of heart problems.  Patient's BUN/creatinine and was elevated at 30/4.4 with.  No history of kidney disease in the past.  His proBNP was greater than 70,000.  Troponin was 81.  CBC was normal.  Patient random blood sugar though was also 253.  Patient signed out AMA but came back into the hospital because his [facility] physician[facility]nurse practitioner and told to come back in.  Patient states symptoms about the same with a WBC 6.3 and hemoglobin 13.0 today.  Pending repeat BMP today.  Temperature was 98 with a heart rate of 110 and blood pressure 94/78.  O2 sat 98% on room air at rest.  Chest x-ray was ordered and pending.  The patient did have a CTA of the lungs that on the 17th which showed no evidence of PE but did show bilateral pleural effusion.    8/20/2024  Patient seen examined on PCU.  Patient states he feels a lot better today.  Patient denies any current chest pain, abdominal pain, nausea or vomiting or unusual shortness of breath.  BUN/creatinine was 29/4.3.  Blood sugars range .  Hemoglobin A1c 7.4.  WBC 5.7 with hemoglobin 11.7.  Temperature 98.3 with heart rate 84 blood pressure 136/65.  O2 sat 94% on room air at rest.  CT abdomen pelvis yesterday showed no acute renal obstruction.  Possible constipation with moderate bladder distention.  Bladder scan today.  Pending echocardiogram

## 2024-08-22 LAB
ALBUMIN SERPL-MCNC: 2.5 G/DL (ref 3.5–5.2)
ALP SERPL-CCNC: 93 U/L (ref 40–129)
ALT SERPL-CCNC: 19 U/L (ref 0–40)
ANION GAP SERPL CALCULATED.3IONS-SCNC: 11 MMOL/L (ref 7–16)
AST SERPL-CCNC: 28 U/L (ref 0–39)
BASOPHILS # BLD: 0.02 K/UL (ref 0–0.2)
BASOPHILS NFR BLD: 0 % (ref 0–2)
BILIRUB SERPL-MCNC: 0.3 MG/DL (ref 0–1.2)
BUN SERPL-MCNC: 39 MG/DL (ref 6–23)
CALCIUM SERPL-MCNC: 8.3 MG/DL (ref 8.6–10.2)
CHLORIDE SERPL-SCNC: 106 MMOL/L (ref 98–107)
CO2 SERPL-SCNC: 21 MMOL/L (ref 22–29)
CREAT SERPL-MCNC: 4.7 MG/DL (ref 0.7–1.2)
EOSINOPHIL # BLD: 0.21 K/UL (ref 0.05–0.5)
EOSINOPHILS RELATIVE PERCENT: 4 % (ref 0–6)
ERYTHROCYTE [DISTWIDTH] IN BLOOD BY AUTOMATED COUNT: 14.3 % (ref 11.5–15)
GFR, ESTIMATED: 12 ML/MIN/1.73M2
GLUCOSE BLD-MCNC: 144 MG/DL (ref 74–99)
GLUCOSE BLD-MCNC: 160 MG/DL (ref 74–99)
GLUCOSE BLD-MCNC: 174 MG/DL (ref 74–99)
GLUCOSE BLD-MCNC: 239 MG/DL (ref 74–99)
GLUCOSE SERPL-MCNC: 112 MG/DL (ref 74–99)
HCT VFR BLD AUTO: 36.8 % (ref 37–54)
HGB BLD-MCNC: 11.5 G/DL (ref 12.5–16.5)
IMM GRANULOCYTES # BLD AUTO: <0.03 K/UL (ref 0–0.58)
IMM GRANULOCYTES NFR BLD: 0 % (ref 0–5)
LYMPHOCYTES NFR BLD: 0.97 K/UL (ref 1.5–4)
LYMPHOCYTES RELATIVE PERCENT: 21 % (ref 20–42)
MCH RBC QN AUTO: 29.7 PG (ref 26–35)
MCHC RBC AUTO-ENTMCNC: 31.3 G/DL (ref 32–34.5)
MCV RBC AUTO: 95.1 FL (ref 80–99.9)
MONOCYTES NFR BLD: 0.31 K/UL (ref 0.1–0.95)
MONOCYTES NFR BLD: 7 % (ref 2–12)
NEUTROPHILS NFR BLD: 68 % (ref 43–80)
NEUTS SEG NFR BLD: 3.21 K/UL (ref 1.8–7.3)
PLATELET # BLD AUTO: 245 K/UL (ref 130–450)
PMV BLD AUTO: 10.6 FL (ref 7–12)
POTASSIUM SERPL-SCNC: 4.6 MMOL/L (ref 3.5–5)
PROT SERPL-MCNC: 5.6 G/DL (ref 6.4–8.3)
RBC # BLD AUTO: 3.87 M/UL (ref 3.8–5.8)
SODIUM SERPL-SCNC: 138 MMOL/L (ref 132–146)
WBC OTHER # BLD: 4.7 K/UL (ref 4.5–11.5)

## 2024-08-22 PROCEDURE — 6360000002 HC RX W HCPCS: Performed by: INTERNAL MEDICINE

## 2024-08-22 PROCEDURE — 82962 GLUCOSE BLOOD TEST: CPT

## 2024-08-22 PROCEDURE — 6370000000 HC RX 637 (ALT 250 FOR IP): Performed by: INTERNAL MEDICINE

## 2024-08-22 PROCEDURE — 94640 AIRWAY INHALATION TREATMENT: CPT

## 2024-08-22 PROCEDURE — 1200000000 HC SEMI PRIVATE

## 2024-08-22 PROCEDURE — 99233 SBSQ HOSP IP/OBS HIGH 50: CPT | Performed by: INTERNAL MEDICINE

## 2024-08-22 PROCEDURE — 80053 COMPREHEN METABOLIC PANEL: CPT

## 2024-08-22 PROCEDURE — 85025 COMPLETE CBC W/AUTO DIFF WBC: CPT

## 2024-08-22 PROCEDURE — 87086 URINE CULTURE/COLONY COUNT: CPT

## 2024-08-22 PROCEDURE — 36415 COLL VENOUS BLD VENIPUNCTURE: CPT

## 2024-08-22 PROCEDURE — 2580000003 HC RX 258: Performed by: INTERNAL MEDICINE

## 2024-08-22 RX ADMIN — ISOSORBIDE MONONITRATE 30 MG: 30 TABLET, EXTENDED RELEASE ORAL at 09:38

## 2024-08-22 RX ADMIN — SODIUM CHLORIDE: 9 INJECTION, SOLUTION INTRAVENOUS at 11:27

## 2024-08-22 RX ADMIN — BUPROPION HYDROCHLORIDE 300 MG: 150 TABLET, EXTENDED RELEASE ORAL at 09:38

## 2024-08-22 RX ADMIN — IPRATROPIUM BROMIDE AND ALBUTEROL SULFATE 1 DOSE: .5; 2.5 SOLUTION RESPIRATORY (INHALATION) at 14:45

## 2024-08-22 RX ADMIN — ATORVASTATIN CALCIUM 80 MG: 40 TABLET, FILM COATED ORAL at 21:41

## 2024-08-22 RX ADMIN — PANTOPRAZOLE SODIUM 40 MG: 40 TABLET, DELAYED RELEASE ORAL at 05:34

## 2024-08-22 RX ADMIN — HEPARIN SODIUM 5000 UNITS: 5000 INJECTION INTRAVENOUS; SUBCUTANEOUS at 05:34

## 2024-08-22 RX ADMIN — BUDESONIDE 500 MCG: 0.5 SUSPENSION RESPIRATORY (INHALATION) at 18:12

## 2024-08-22 RX ADMIN — IPRATROPIUM BROMIDE AND ALBUTEROL SULFATE 1 DOSE: .5; 2.5 SOLUTION RESPIRATORY (INHALATION) at 09:27

## 2024-08-22 RX ADMIN — METOPROLOL SUCCINATE 50 MG: 50 TABLET, EXTENDED RELEASE ORAL at 09:38

## 2024-08-22 RX ADMIN — IPRATROPIUM BROMIDE AND ALBUTEROL SULFATE 1 DOSE: .5; 2.5 SOLUTION RESPIRATORY (INHALATION) at 18:10

## 2024-08-22 RX ADMIN — BUDESONIDE 500 MCG: 0.5 SUSPENSION RESPIRATORY (INHALATION) at 04:48

## 2024-08-22 RX ADMIN — HEPARIN SODIUM 5000 UNITS: 5000 INJECTION INTRAVENOUS; SUBCUTANEOUS at 14:48

## 2024-08-22 RX ADMIN — EMPAGLIFLOZIN 10 MG: 10 TABLET, FILM COATED ORAL at 09:38

## 2024-08-22 RX ADMIN — IPRATROPIUM BROMIDE AND ALBUTEROL SULFATE 1 DOSE: .5; 2.5 SOLUTION RESPIRATORY (INHALATION) at 04:48

## 2024-08-22 RX ADMIN — ASPIRIN 81 MG: 81 TABLET, COATED ORAL at 09:38

## 2024-08-22 NOTE — PLAN OF CARE
Problem: Discharge Planning  Goal: Discharge to home or other facility with appropriate resources  8/21/2024 2122 by Natali Marc RN  Outcome: Progressing  8/21/2024 1637 by Janeen Batres RN  Outcome: Progressing     Problem: Pain  Goal: Verbalizes/displays adequate comfort level or baseline comfort level  8/21/2024 2122 by Natali Marc RN  Outcome: Progressing  8/21/2024 1637 by Janeen Batres RN  Outcome: Progressing     Problem: ABCDS Injury Assessment  Goal: Absence of physical injury  8/21/2024 2122 by Natali Marc RN  Outcome: Progressing  8/21/2024 1637 by Janeen Batres RN  Outcome: Progressing     Problem: Safety - Adult  Goal: Free from fall injury  Outcome: Progressing

## 2024-08-22 NOTE — PROGRESS NOTES
INPATIENT CARDIOLOGY FOLLOW-UP    Name: Deion Knight    Age: 74 y.o.    Date of Admission: 8/19/2024 12:07 PM    Date of Service: 8/22/2024    Primary Cardiologist: New to me, [facility]    Chief Complaint: Follow-up for decompensated heart failure-heart failure with reduced ejection    Interim History:  No new overnight cardiac complaints. Currently with no complaints of CP, SOB, palpitations, dizziness, or lightheadedness. SR on telemetry.      Review of Systems:   Negative except as described above    Problem List:  Patient Active Problem List   Diagnosis    ANGIE (acute kidney injury) (HCC)    Acute renal failure (ARF) (HCC)    Acute decompensated heart failure (HCC)    Shortness of breath    NICM (nonischemic cardiomyopathy) (HCC)    Elevated troponin       Current Medications:    Current Facility-Administered Medications:     empagliflozin (JARDIANCE) tablet 10 mg, 10 mg, Oral, Daily, Du, Mauro A, DO, 10 mg at 08/21/24 1109    0.9 % sodium chloride infusion, , IntraVENous, Continuous, Gómez Hammonds MD, Last Rate: 50 mL/hr at 08/21/24 1557, New Bag at 08/21/24 1557    acetaminophen (TYLENOL) tablet 500 mg, 500 mg, Oral, Q6H PRN, Du, Mauro A, DO    heparin (porcine) injection 5,000 Units, 5,000 Units, SubCUTAneous, 3 times per day, Du, Mauro A, DO, 5,000 Units at 08/22/24 0534    aspirin EC tablet 81 mg, 81 mg, Oral, Daily, Du, Mauro A, DO, 81 mg at 08/21/24 0935    ipratropium 0.5 mg-albuterol 2.5 mg (DUONEB) nebulizer solution 1 Dose, 1 Dose, Inhalation, 4x Daily RT, Du, Mauro A, DO, 1 Dose at 08/22/24 0448    budesonide (PULMICORT) nebulizer suspension 500 mcg, 500 mcg, Nebulization, BID RT, Du, Mauro A, DO, 500 mcg at 08/22/24 0448    pantoprazole (PROTONIX) tablet 40 mg, 40 mg, Oral, QAM AC, Du, Mauro A, DO, 40 mg at 08/22/24 0534    polyethylene glycol (GLYCOLAX) packet 17 g, 17 g, Oral, Daily PRN, Mauro Du DO    prochlorperazine (COMPAZINE)  Severely reduced left ventricular systolic function with a visually estimated EF of 15 - 20%. Left ventricle is mildly dilated. Findings consistent with severe eccentric hypertrophy. Severe global hypokinesis present. Grade II diastolic dysfunction with increased LAP.     Right Ventricle: Right ventricle size is normal. Normal systolic function.     Aortic Valve: Mild sclerosis of the aortic valve cusps. Mild regurgitation.     Mitral Valve: Mild annular calcification. Mild regurgitation.     Tricuspid Valve: Unable to assess RVSP.     Left Atrium: Left atrium is moderately dilated.     Right Atrium: Right atrium size is normal.     Pericardium: Trivial pericardial effusion present. No indication of cardiac tamponade.     Image quality is adequate.      [facility].  Echocardiogram from October 23 with an EF of 50%.  Images not available.  Wall motion abnormality seen    .  Nuclear perfusion scan from 2022 suggestive of infarction without ischemia.  Not clear if he has had cardiac catheterization.  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  Severe LV systolic dysfunction.  Fortunately, RV function appears preserved.  Grade 2 diastolic dysfunction.  No more than mild valvular disease.  Unable to estimate PA pressure.  .  Limited prior [facility] the [facility].  Unclear if cardiac catheterization was done.  In any case, further ischemic evaluation can be done on an outpatient basis  His significant renal failure does complicate matters.  Currently receiving gentle hydration.  Appears hypervolemic on

## 2024-08-22 NOTE — PROGRESS NOTES
Department of Internal Medicine        CHIEF COMPLAINT:  + SOB with exertion, intermittent upper abdominal pain    Reason for Admission: Acute renal failure, CHF    HISTORY OF PRESENT ILLNESS:      The patient is a 74 y.o. male who presents with increased shortness of breath has been going on for about a month.  Patient was seen in the ED 2 days ago with the same symptoms of shortness of breath when he lays down and when he ambulates.  Patient said the symptoms going on for about a month.  He denies any chest pain or palpitations or dizziness.  He denies any fever/chills or productive cough.  He denies any history of heart problems.  Patient's BUN/creatinine and was elevated at 30/4.4 with.  No history of kidney disease in the past.  His proBNP was greater than 70,000.  Troponin was 81.  CBC was normal.  Patient random blood sugar though was also 253.  Patient signed out AMA but came back into the hospital because his [facility] physician/nurse practitioner and told to come back in.  Patient states symptoms about the same with a WBC 6.3 and hemoglobin 13.0 today.  Pending repeat BMP today.  Temperature was 98 with a heart rate of 110 and blood pressure 94/78.  O2 sat 98% on room air at rest.  Chest x-ray was ordered and pending.  The patient did have a CTA of the lungs that on the 17th which showed no evidence of PE but did show bilateral pleural effusion.    8/20/2024  Patient seen examined on PCU.  Patient states he feels a lot better today.  Patient denies any current chest pain, abdominal pain, nausea or vomiting or unusual shortness of breath.  BUN/creatinine was 29/4.3.  Blood sugars range .  Hemoglobin A1c 7.4.  WBC 5.7 with hemoglobin 11.7.  Temperature 98.3 with heart rate 84 blood pressure 136/65.  O2 sat 94% on room air at rest.  CT abdomen pelvis yesterday showed no acute renal obstruction.  Possible constipation with moderate bladder distention.  Bladder scan today.  Pending echocardiogram  tablet by mouth daily   Yes Debbie Mohan MD   Nutritional Supplements (GLUCERNA) LIQD Take 1 Canister by mouth Daily with lunch   Yes Debbie Mohan MD   pantoprazole (PROTONIX) 40 MG tablet Take 1 tablet by mouth daily   Yes Debbie Mohan MD   isosorbide mononitrate (IMDUR) 30 MG extended release tablet Take 1 tablet by mouth daily   Yes Debbie Mohan MD   metoprolol succinate (TOPROL XL) 50 MG extended release tablet Take 1 tablet by mouth daily   Yes Debbie Mohan MD   Multiple Vitamins-Minerals (THERAPEUTIC MULTIVITAMIN-MINERALS) tablet Take 1 tablet by mouth daily   Yes Debbie Mohan MD       Allergies:  Patient has no known allergies.    Social History:   Social History     Socioeconomic History    Marital status: Single     Spouse name: Not on file    Number of children: Not on file    Years of education: Not on file    Highest education level: Not on file   Occupational History    Not on file   Tobacco Use    Smoking status: Every Day     Current packs/day: 0.50     Types: Cigarettes    Smokeless tobacco: Never   Vaping Use    Vaping status: Never Used   Substance and Sexual Activity    Alcohol use: Never    Drug use: Not Currently    Sexual activity: Not on file   Other Topics Concern    Not on file   Social History Narrative    Not on file     Social Determinants of Health     Financial Resource Strain: Not on file   Food Insecurity: No Food Insecurity (8/19/2024)    Hunger Vital Sign     Worried About Running Out of Food in the Last Year: Never true     Ran Out of Food in the Last Year: Never true   Transportation Needs: No Transportation Needs (8/19/2024)    PRAPARE - Transportation     Lack of Transportation (Medical): No     Lack of Transportation (Non-Medical): No   Physical Activity: Not on file   Stress: Not on file   Social Connections: Not on file   Intimate Partner Violence: Not on file   Housing Stability: Low Risk  (8/19/2024)    Housing Stability

## 2024-08-22 NOTE — CARE COORDINATION
8/22/24 1030 CM note: covid (-) 8/17/24. IVF. Bun/creat 39/4.7 today, Dr Hammonds following. Room air. New jardiance; however pt states he's been on it before and [facility] because \"they didn't like the results\". Discharge plan is home and pt declines any needs. Pt lives alone in a mobile home, he is independent at baseline. DME of cane, walker and shower chair but states he does not use them. Ho hx of HHC/SNF. Pts PCP is Kayleen Alanis CNP at the [facility]. States he gets all of [facility]s thru the [facility]. CM will follow. Electronically signed by Tatiana Gray RN on 8/22/2024 at 10:39 AM

## 2024-08-22 NOTE — PROGRESS NOTES
08/20/24  1240 08/21/24  1312   CLUR  --  27   NAUR 35 29   UREAUR  --  249*   OSMOU 381  --                                         No results for input(s): \"MALBCR\", \"LABPROT\" in the last 72 hours.          Assessment: / Plan:      Acute kidney injury.  Acute kidney injury with underlying history of chronic kidney disease stage G4 with a baseline serum creatinine of 3.3 mg/dL in April 2024 and 3.6 mg/dL in February 2024.  Patient now with acute kidney injury which may have a component of contrast induced nephrotoxicity.  Patient with stable serum creatinine we will continue hydration for now.  Imaging studies were negative for any obstruction.      Hypertension with chronic kidney disease stage G1 to stage G4   Hypertension is well controlled with blood pressure at target of less than 130/80 mmHg.    Metabolic acidosis.  Metabolic acidosis with  normal anion gap.  Metabolic acidosis is mild.  If needed we will supplement bicarbonate target a bicarb level of 22 mmol/L.    Anemia of  chronic kidney disease.  We will check serum iron studies.  Follow hemoglobin and hematocrit.  Follow serum iron studies.  If needed will use JEREL and titrate the dose to a target hemoglobin of 10.0 g/dL.    Hypocalcemia.  This may reflect hypoalbuminemia.  Ionized calcium vitamin D and PTH levels pending..      Chronic kidney disease stage G4 of unknown etiology but likely secondary to hypertensive kidney disease  with a baseline serum creatinine of 3.3 mg/dL to 3.6 mg/dL.              Gómez Hammonds MD  Nephrology  Electronically signed by Gómez Hammonds MD on 8/22/2024 at 2:06 PM      Note: This report was completed utilizing computer voice recognition software. Every effort has been made to ensure accuracy, however; inadvertent computerized transcription errors may be present.

## 2024-08-23 VITALS
BODY MASS INDEX: 21.22 KG/M2 | WEIGHT: 140 LBS | HEART RATE: 87 BPM | SYSTOLIC BLOOD PRESSURE: 140 MMHG | OXYGEN SATURATION: 92 % | HEIGHT: 68 IN | DIASTOLIC BLOOD PRESSURE: 76 MMHG | RESPIRATION RATE: 18 BRPM | TEMPERATURE: 97.2 F

## 2024-08-23 LAB
25(OH)D3 SERPL-MCNC: 14.1 NG/ML (ref 30–100)
ALBUMIN SERPL-MCNC: 2.9 G/DL (ref 3.5–5.2)
ALP SERPL-CCNC: 106 U/L (ref 40–129)
ALT SERPL-CCNC: 19 U/L (ref 0–40)
ANION GAP SERPL CALCULATED.3IONS-SCNC: 11 MMOL/L (ref 7–16)
AST SERPL-CCNC: 28 U/L (ref 0–39)
BASOPHILS # BLD: 0.01 K/UL (ref 0–0.2)
BASOPHILS NFR BLD: 0 % (ref 0–2)
BILIRUB SERPL-MCNC: 0.3 MG/DL (ref 0–1.2)
BUN SERPL-MCNC: 43 MG/DL (ref 6–23)
CA-I BLD-SCNC: 1.24 MMOL/L (ref 1.15–1.33)
CALCIUM SERPL-MCNC: 8.6 MG/DL (ref 8.6–10.2)
CHLORIDE SERPL-SCNC: 105 MMOL/L (ref 98–107)
CO2 SERPL-SCNC: 20 MMOL/L (ref 22–29)
CREAT SERPL-MCNC: 4.7 MG/DL (ref 0.7–1.2)
EOSINOPHIL # BLD: 0.26 K/UL (ref 0.05–0.5)
EOSINOPHILS RELATIVE PERCENT: 4 % (ref 0–6)
ERYTHROCYTE [DISTWIDTH] IN BLOOD BY AUTOMATED COUNT: 14.1 % (ref 11.5–15)
FERRITIN SERPL-MCNC: 147 NG/ML
GFR, ESTIMATED: 12 ML/MIN/1.73M2
GLUCOSE BLD-MCNC: 130 MG/DL (ref 74–99)
GLUCOSE BLD-MCNC: 147 MG/DL (ref 74–99)
GLUCOSE BLD-MCNC: 236 MG/DL (ref 74–99)
GLUCOSE BLD-MCNC: 270 MG/DL (ref 74–99)
GLUCOSE SERPL-MCNC: 132 MG/DL (ref 74–99)
HCT VFR BLD AUTO: 37.4 % (ref 37–54)
HGB BLD-MCNC: 11.6 G/DL (ref 12.5–16.5)
IMM GRANULOCYTES # BLD AUTO: <0.03 K/UL (ref 0–0.58)
IMM GRANULOCYTES NFR BLD: 0 % (ref 0–5)
IRON SATN MFR SERPL: 22 % (ref 20–55)
IRON SERPL-MCNC: 41 UG/DL (ref 59–158)
LYMPHOCYTES NFR BLD: 0.68 K/UL (ref 1.5–4)
LYMPHOCYTES RELATIVE PERCENT: 11 % (ref 20–42)
MAGNESIUM SERPL-MCNC: 1.7 MG/DL (ref 1.6–2.6)
MCH RBC QN AUTO: 29.5 PG (ref 26–35)
MCHC RBC AUTO-ENTMCNC: 31 G/DL (ref 32–34.5)
MCV RBC AUTO: 95.2 FL (ref 80–99.9)
MICROORGANISM SPEC CULT: ABNORMAL
MICROORGANISM SPEC CULT: ABNORMAL
MONOCYTES NFR BLD: 0.43 K/UL (ref 0.1–0.95)
MONOCYTES NFR BLD: 7 % (ref 2–12)
NEUTROPHILS NFR BLD: 77 % (ref 43–80)
NEUTS SEG NFR BLD: 4.59 K/UL (ref 1.8–7.3)
PHOSPHATE SERPL-MCNC: 4.3 MG/DL (ref 2.5–4.5)
PLATELET # BLD AUTO: 248 K/UL (ref 130–450)
PMV BLD AUTO: 10.5 FL (ref 7–12)
POTASSIUM SERPL-SCNC: 4.9 MMOL/L (ref 3.5–5)
PROT SERPL-MCNC: 6.2 G/DL (ref 6.4–8.3)
PTH-INTACT SERPL-MCNC: 337.1 PG/ML (ref 15–65)
RBC # BLD AUTO: 3.93 M/UL (ref 3.8–5.8)
SERVICE CMNT-IMP: ABNORMAL
SODIUM SERPL-SCNC: 136 MMOL/L (ref 132–146)
SPECIMEN DESCRIPTION: ABNORMAL
TIBC SERPL-MCNC: 186 UG/DL (ref 250–450)
URATE SERPL-MCNC: 5.1 MG/DL (ref 3.4–7)
WBC OTHER # BLD: 6 K/UL (ref 4.5–11.5)

## 2024-08-23 PROCEDURE — 6370000000 HC RX 637 (ALT 250 FOR IP): Performed by: INTERNAL MEDICINE

## 2024-08-23 PROCEDURE — 80053 COMPREHEN METABOLIC PANEL: CPT

## 2024-08-23 PROCEDURE — 83540 ASSAY OF IRON: CPT

## 2024-08-23 PROCEDURE — 82728 ASSAY OF FERRITIN: CPT

## 2024-08-23 PROCEDURE — 6360000002 HC RX W HCPCS: Performed by: INTERNAL MEDICINE

## 2024-08-23 PROCEDURE — 82306 VITAMIN D 25 HYDROXY: CPT

## 2024-08-23 PROCEDURE — 2580000003 HC RX 258: Performed by: INTERNAL MEDICINE

## 2024-08-23 PROCEDURE — 83970 ASSAY OF PARATHORMONE: CPT

## 2024-08-23 PROCEDURE — 84100 ASSAY OF PHOSPHORUS: CPT

## 2024-08-23 PROCEDURE — 82330 ASSAY OF CALCIUM: CPT

## 2024-08-23 PROCEDURE — 85025 COMPLETE CBC W/AUTO DIFF WBC: CPT

## 2024-08-23 PROCEDURE — 83550 IRON BINDING TEST: CPT

## 2024-08-23 PROCEDURE — 36415 COLL VENOUS BLD VENIPUNCTURE: CPT

## 2024-08-23 PROCEDURE — 84550 ASSAY OF BLOOD/URIC ACID: CPT

## 2024-08-23 PROCEDURE — 99233 SBSQ HOSP IP/OBS HIGH 50: CPT | Performed by: INTERNAL MEDICINE

## 2024-08-23 PROCEDURE — 94640 AIRWAY INHALATION TREATMENT: CPT

## 2024-08-23 PROCEDURE — 1200000000 HC SEMI PRIVATE

## 2024-08-23 PROCEDURE — 83735 ASSAY OF MAGNESIUM: CPT

## 2024-08-23 PROCEDURE — 82962 GLUCOSE BLOOD TEST: CPT

## 2024-08-23 RX ORDER — DOXYCYCLINE HYCLATE 100 MG/1
100 CAPSULE ORAL EVERY 12 HOURS SCHEDULED
Status: DISCONTINUED | OUTPATIENT
Start: 2024-08-23 | End: 2024-08-24 | Stop reason: HOSPADM

## 2024-08-23 RX ORDER — VITAMIN B COMPLEX
5000 TABLET ORAL DAILY
Status: DISCONTINUED | OUTPATIENT
Start: 2024-08-23 | End: 2024-08-24 | Stop reason: HOSPADM

## 2024-08-23 RX ADMIN — IPRATROPIUM BROMIDE AND ALBUTEROL SULFATE 1 DOSE: .5; 2.5 SOLUTION RESPIRATORY (INHALATION) at 17:48

## 2024-08-23 RX ADMIN — DOXYCYCLINE HYCLATE 100 MG: 100 CAPSULE ORAL at 20:44

## 2024-08-23 RX ADMIN — IPRATROPIUM BROMIDE AND ALBUTEROL SULFATE 1 DOSE: .5; 2.5 SOLUTION RESPIRATORY (INHALATION) at 04:57

## 2024-08-23 RX ADMIN — ASPIRIN 81 MG: 81 TABLET, COATED ORAL at 09:37

## 2024-08-23 RX ADMIN — IPRATROPIUM BROMIDE AND ALBUTEROL SULFATE 1 DOSE: .5; 2.5 SOLUTION RESPIRATORY (INHALATION) at 09:31

## 2024-08-23 RX ADMIN — METOPROLOL SUCCINATE 50 MG: 50 TABLET, EXTENDED RELEASE ORAL at 09:37

## 2024-08-23 RX ADMIN — BUDESONIDE 500 MCG: 0.5 SUSPENSION RESPIRATORY (INHALATION) at 17:48

## 2024-08-23 RX ADMIN — EMPAGLIFLOZIN 10 MG: 10 TABLET, FILM COATED ORAL at 09:42

## 2024-08-23 RX ADMIN — SODIUM CHLORIDE: 9 INJECTION, SOLUTION INTRAVENOUS at 10:14

## 2024-08-23 RX ADMIN — BUPROPION HYDROCHLORIDE 300 MG: 150 TABLET, EXTENDED RELEASE ORAL at 09:37

## 2024-08-23 RX ADMIN — BUDESONIDE 500 MCG: 0.5 SUSPENSION RESPIRATORY (INHALATION) at 04:57

## 2024-08-23 RX ADMIN — IPRATROPIUM BROMIDE AND ALBUTEROL SULFATE 1 DOSE: .5; 2.5 SOLUTION RESPIRATORY (INHALATION) at 14:37

## 2024-08-23 RX ADMIN — HEPARIN SODIUM 5000 UNITS: 5000 INJECTION INTRAVENOUS; SUBCUTANEOUS at 13:54

## 2024-08-23 RX ADMIN — ATORVASTATIN CALCIUM 80 MG: 40 TABLET, FILM COATED ORAL at 20:44

## 2024-08-23 RX ADMIN — Medication 5000 UNITS: at 13:54

## 2024-08-23 RX ADMIN — INSULIN LISPRO 2 UNITS: 100 INJECTION, SOLUTION INTRAVENOUS; SUBCUTANEOUS at 12:09

## 2024-08-23 RX ADMIN — ISOSORBIDE MONONITRATE 30 MG: 30 TABLET, EXTENDED RELEASE ORAL at 09:37

## 2024-08-23 NOTE — PROGRESS NOTES
Nephrology Note  Gómez Hammonds MD          Patient seen and examined.  No family member is present at bedside.  Chart reviewed.  Patient is feeling better.  Denies shortness of breath. Appetite good. No nausea or dysguesia.   Awake and alert .   In no acute distress.    Vital SignsBP 125/71   Pulse 84   Temp 97 °F (36.1 °C) (Oral)   Resp 18   Ht 1.727 m (5' 7.99\")   Wt 63.5 kg (140 lb)   SpO2 99%   BMI 21.29 kg/m²   24HR INTAKE/OUTPUT:    Intake/Output Summary (Last 24 hours) at 8/23/2024 1340  Last data filed at 8/22/2024 1615  Gross per 24 hour   Intake --   Output 250 ml   Net -250 ml         PHYSICAL EXAM        Neck: No JVD  Lungs: Breath sounds decreased at the bases. No rales or ronchi.  Heart: Regular rate and rhythm. No S3 gallop. No  murmrur.  Abdomen: Soft non distended, non tender and normal bowel sounds.  Extremeties:   No edema.            Current Facility-Administered Medications   Medication Dose Route Frequency Provider Last Rate Last Admin    doxycycline hyclate (VIBRAMYCIN) capsule 100 mg  100 mg Oral 2 times per day Mauro Du A, DO        empagliflozin (JARDIANCE) tablet 10 mg  10 mg Oral Daily Mauro Du A, DO   10 mg at 08/23/24 0942    0.9 % sodium chloride infusion   IntraVENous Continuous Gómez Hammonds MD 50 mL/hr at 08/23/24 1014 New Bag at 08/23/24 1014    acetaminophen (TYLENOL) tablet 500 mg  500 mg Oral Q6H PRN Mauro Du A, DO        heparin (porcine) injection 5,000 Units  5,000 Units SubCUTAneous 3 times per day Mauro Du A, DO   5,000 Units at 08/22/24 1448    aspirin EC tablet 81 mg  81 mg Oral Daily Mauro Du A, DO   81 mg at 08/23/24 0937    ipratropium 0.5 mg-albuterol 2.5 mg (DUONEB) nebulizer solution 1 Dose  1 Dose Inhalation 4x Daily RT Mauro Du A DO   1 Dose at 08/23/24 0931    budesonide (PULMICORT) nebulizer suspension 500 mcg  500 mcg Nebulization BID RT Mauro Du, DO   500 mcg at 08/23/24 5624

## 2024-08-23 NOTE — CARE COORDINATION
8/23/24 1840 CM note: covid (-) 8/17/24, urine cx (+) 8/22/24. PO vibramycin.Bun/creat 43/4.7 today, Dr Hammonds following. Room air. New jardiance; however pt states he's been on it before and [facility] \"they didn't like the results\". Discharge plan is home and pt declines any needs. Pt lives alone in a mobile home, he is independent at baseline. DME of cane, walker and shower chair but states he does not use them. Ho hx of HHC/SNF. Pts PCP is Kayleen Alanis CNP at the [facility]. States he [facility] all [facility]. CM will follow. Electronically signed by Tatiana Gray RN on 8/23/2024 at 6:42 PM

## 2024-08-23 NOTE — PROGRESS NOTES
INPATIENT CARDIOLOGY FOLLOW-UP    Name: Deion Knight    Age: 74 y.o.    Date of Admission: 8/19/2024 12:07 PM    Date of Service: 8/23/2024    Primary Cardiologist: New to me, [facility]    Chief Complaint: Follow-up for decompensated heart failure-heart failure with reduced ejection    Interim History:  No new overnight cardiac complaints. Currently with no complaints of CP, SOB, palpitations, dizziness, or lightheadedness. SR on telemetry.    No new complaints overnight.    Review of Systems:   Negative except as described above    Problem List:  Patient Active Problem List   Diagnosis    ANGIE (acute kidney injury) (HCC)    Acute renal failure (ARF) (HCC)    Acute decompensated heart failure (HCC)    Shortness of breath    NICM (nonischemic cardiomyopathy) (HCC)    Elevated troponin       Current Medications:    Current Facility-Administered Medications:     empagliflozin (JARDIANCE) tablet 10 mg, 10 mg, Oral, Daily, Du, Mauro A, DO, 10 mg at 08/23/24 0942    0.9 % sodium chloride infusion, , IntraVENous, Continuous, Gómez Hammonds MD, Last Rate: 50 mL/hr at 08/22/24 1127, New Bag at 08/22/24 1127    acetaminophen (TYLENOL) tablet 500 mg, 500 mg, Oral, Q6H PRN, Du, Mauro A, DO    heparin (porcine) injection 5,000 Units, 5,000 Units, SubCUTAneous, 3 times per day, Du, Mauro A, DO, 5,000 Units at 08/22/24 1448    aspirin EC tablet 81 mg, 81 mg, Oral, Daily, Du, Mauro A, DO, 81 mg at 08/23/24 0937    ipratropium 0.5 mg-albuterol 2.5 mg (DUONEB) nebulizer solution 1 Dose, 1 Dose, Inhalation, 4x Daily RT, Du, Mauro A, DO, 1 Dose at 08/23/24 0931    budesonide (PULMICORT) nebulizer suspension 500 mcg, 500 mcg, Nebulization, BID RT, Du, Mauro A, DO, 500 mcg at 08/23/24 0457    pantoprazole (PROTONIX) tablet 40 mg, 40 mg, Oral, QAM AC, Du, Mauro A, DO, 40 mg at 08/22/24 0534    polyethylene glycol (GLYCOLAX) packet 17 g, 17 g, Oral, Daily PRN, Mauro Du, DO     prochlorperazine (COMPAZINE) injection 5 mg, 5 mg, IntraVENous, Q6H PRN, Du, Mauro A, DO    glucose chewable tablet 16 g, 4 tablet, Oral, PRN, Du, Mauro A, DO    dextrose bolus 10% 125 mL, 125 mL, IntraVENous, PRN **OR** dextrose bolus 10% 250 mL, 250 mL, IntraVENous, PRN, Du, Mauro A, DO    glucagon injection 1 mg, 1 mg, SubCUTAneous, PRN, Du, Mauro A, DO    dextrose 10 % infusion, , IntraVENous, Continuous PRN, Du, Mauro A, DO    insulin lispro (HUMALOG,ADMELOG) injection vial 0-8 Units, 0-8 Units, SubCUTAneous, TID WC, Du, Mauro A, DO, 2 Units at 08/21/24 1140    insulin lispro (HUMALOG,ADMELOG) injection vial 0-4 Units, 0-4 Units, SubCUTAneous, Nightly, Du, Mauro A, DO    nicotine (NICODERM CQ) 7 MG/24HR 1 patch, 1 patch, TransDERmal, Daily, Omar, Ismail U, DO, 1 patch at 08/23/24 0955    buPROPion (WELLBUTRIN XL) extended release tablet 300 mg, 300 mg, Oral, QAM, Omar, Ismail U, DO, 300 mg at 08/23/24 0937    isosorbide mononitrate (IMDUR) extended release tablet 30 mg, 30 mg, Oral, Daily, Omar, Ismail U, DO, 30 mg at 08/23/24 0937    metoprolol succinate (TOPROL XL) extended release tablet 50 mg, 50 mg, Oral, Daily, Omar, Ismail U, DO, 50 mg at 08/23/24 0937    atorvastatin (LIPITOR) tablet 80 mg, 80 mg, Oral, Nightly, Omar, Ismail U, DO, 80 mg at 08/22/24 2141    Physical Exam:  /74   Pulse 84   Temp 98 °F (36.7 °C) (Oral)   Resp 17   Ht 1.727 m (5' 7.99\")   Wt 63.5 kg (140 lb)   SpO2 94%   BMI 21.29 kg/m²   Wt Readings from Last 3 Encounters:   08/20/24 63.5 kg (140 lb)   08/17/24 63.5 kg (140 lb)     Appearance: Awake, alert, no acute respiratory distress  Skin: Intact, no rash  Head: Normocephalic, atraumatic  Eyes: EOMI, no conjunctival erythema  ENMT: No pharyngeal erythema, MMM, no rhinorrhea  Neck: Supple, JVP at 11 cm, no carotid bruits  Lungs: Clear to auscultation bilaterally. No wheezes, rales, or rhonchi.  Cardiac: PMI nondisplaced,

## 2024-08-23 NOTE — PROGRESS NOTES
Department of Internal Medicine        CHIEF COMPLAINT:  + SOB with exertion, intermittent upper abdominal pain    Reason for Admission: Acute renal failure, CHF    HISTORY OF PRESENT ILLNESS:      The patient is a 74 y.o. male who presents with increased shortness of breath has been going on for about a month.  Patient was seen in the ED 2 days ago with the same symptoms of shortness of breath when he lays down and when he ambulates.  Patient said the symptoms going on for about a month.  He denies any chest pain or palpitations or dizziness.  He denies any fever/chills or productive cough.  He denies any history of heart problems.  Patient's BUN/creatinine and was elevated at 30/4.4 with.  No history of kidney disease in the past.  His proBNP was greater than 70,000.  Troponin was 81.  CBC was normal.  Patient random blood sugar though was also 253.  Patient signed out AMA but came back into the hospital because his [facility] physician/nurse practitioner and told to come back in.  Patient states symptoms about the same with a WBC 6.3 and hemoglobin 13.0 today.  Pending repeat BMP today.  Temperature was 98 with a heart rate of 110 and blood pressure 94/78.  O2 sat 98% on room air at rest.  Chest x-ray was ordered and pending.  The patient did have a CTA of the lungs that on the 17th which showed no evidence of PE but did show bilateral pleural effusion.    8/20/2024  Patient seen examined on PCU.  Patient states he feels a lot better today.  Patient denies any current chest pain, abdominal pain, nausea or vomiting or unusual shortness of breath.  BUN/creatinine was 29/4.3.  Blood sugars range .  Hemoglobin A1c 7.4.  WBC 5.7 with hemoglobin 11.7.  Temperature 98.3 with heart rate 84 blood pressure 136/65.  O2 sat 94% on room air at rest.  CT abdomen pelvis yesterday showed no acute renal obstruction.  Possible constipation with moderate bladder distention.  Bladder scan today.  Pending echocardiogram  nondistended, bowel sounds present  Extremities:  No edema, peripheral pulses intact bilaterally  Neuro:  Cranial nerves II-XII grossly intact; motor and sensory function intact with no focal deficits  Skin:  No rashes, lesions or wounds    DATA:  CBC with Differential:    Lab Results   Component Value Date/Time    WBC 6.0 08/23/2024 05:46 AM    RBC 3.93 08/23/2024 05:46 AM    HGB 11.6 08/23/2024 05:46 AM    HCT 37.4 08/23/2024 05:46 AM     08/23/2024 05:46 AM    MCV 95.2 08/23/2024 05:46 AM    MCH 29.5 08/23/2024 05:46 AM    MCHC 31.0 08/23/2024 05:46 AM    RDW 14.1 08/23/2024 05:46 AM    LYMPHOPCT 11 08/23/2024 05:46 AM    MONOPCT 7 08/23/2024 05:46 AM    EOSPCT 4 08/23/2024 05:46 AM    BASOPCT 0 08/23/2024 05:46 AM    MONOSABS 0.43 08/23/2024 05:46 AM    LYMPHSABS 0.68 08/23/2024 05:46 AM    EOSABS 0.26 08/23/2024 05:46 AM    BASOSABS 0.01 08/23/2024 05:46 AM     CMP:    Lab Results   Component Value Date/Time     08/23/2024 05:46 AM    K 4.9 08/23/2024 05:46 AM     08/23/2024 05:46 AM    CO2 20 08/23/2024 05:46 AM    BUN 43 08/23/2024 05:46 AM    CREATININE 4.7 08/23/2024 05:46 AM    LABGLOM 12 08/23/2024 05:46 AM    GLUCOSE 132 08/23/2024 05:46 AM    CALCIUM 8.6 08/23/2024 05:46 AM    BILITOT 0.3 08/23/2024 05:46 AM    ALKPHOS 106 08/23/2024 05:46 AM    AST 28 08/23/2024 05:46 AM    ALT 19 08/23/2024 05:46 AM     Magnesium:    Lab Results   Component Value Date/Time    MG 1.7 08/23/2024 05:46 AM     Phosphorus:    Lab Results   Component Value Date/Time    PHOS 4.3 08/23/2024 05:46 AM     PT/INR:  No results found for: \"PROTIME\", \"INR\"  Troponin:  No results found for: \"TROPONINI\"  U/A:    Lab Results   Component Value Date/Time    COLORU Yellow 08/21/2024 01:12 PM    PROTEINU >=300 08/21/2024 01:12 PM    PHUR 6.0 08/21/2024 01:12 PM    WBCUA 10 TO 20 08/21/2024 01:12 PM    RBCUA 6 TO 9 08/21/2024 01:12 PM    BACTERIA 1+ 08/21/2024 01:12 PM    LEUKOCYTESUR TRACE 08/21/2024 01:12 PM

## 2024-08-24 ENCOUNTER — APPOINTMENT (OUTPATIENT)
Dept: GENERAL RADIOLOGY | Age: 74
End: 2024-08-24
Payer: OTHER GOVERNMENT

## 2024-08-24 ENCOUNTER — HOSPITAL ENCOUNTER (INPATIENT)
Age: 74
LOS: 7 days | Discharge: HOME HEALTH | End: 2024-08-31
Attending: EMERGENCY MEDICINE | Admitting: INTERNAL MEDICINE
Payer: OTHER GOVERNMENT

## 2024-08-24 DIAGNOSIS — J44.1 COPD EXACERBATION (HCC): ICD-10-CM

## 2024-08-24 DIAGNOSIS — J96.01 ACUTE RESPIRATORY FAILURE WITH HYPOXIA (HCC): Primary | ICD-10-CM

## 2024-08-24 LAB
ALBUMIN SERPL-MCNC: 2.9 G/DL (ref 3.5–5.2)
ALP SERPL-CCNC: 116 U/L (ref 40–129)
ALT SERPL-CCNC: 21 U/L (ref 0–40)
ANION GAP SERPL CALCULATED.3IONS-SCNC: 10 MMOL/L (ref 7–16)
ANION GAP SERPL CALCULATED.3IONS-SCNC: 13 MMOL/L (ref 7–16)
AST SERPL-CCNC: 27 U/L (ref 0–39)
BASOPHILS # BLD: 0 K/UL (ref 0–0.2)
BASOPHILS NFR BLD: 0 % (ref 0–2)
BILIRUB SERPL-MCNC: 0.3 MG/DL (ref 0–1.2)
BUN SERPL-MCNC: 46 MG/DL (ref 6–23)
BUN SERPL-MCNC: 49 MG/DL (ref 6–23)
CALCIUM SERPL-MCNC: 9 MG/DL (ref 8.6–10.2)
CALCIUM SERPL-MCNC: 9.1 MG/DL (ref 8.6–10.2)
CHLORIDE SERPL-SCNC: 101 MMOL/L (ref 98–107)
CHLORIDE SERPL-SCNC: 104 MMOL/L (ref 98–107)
CO2 SERPL-SCNC: 18 MMOL/L (ref 22–29)
CO2 SERPL-SCNC: 20 MMOL/L (ref 22–29)
CREAT SERPL-MCNC: 4.6 MG/DL (ref 0.7–1.2)
CREAT SERPL-MCNC: 4.7 MG/DL (ref 0.7–1.2)
EOSINOPHIL # BLD: 0.06 K/UL (ref 0.05–0.5)
EOSINOPHILS RELATIVE PERCENT: 1 % (ref 0–6)
ERYTHROCYTE [DISTWIDTH] IN BLOOD BY AUTOMATED COUNT: 14.2 % (ref 11.5–15)
GFR, ESTIMATED: 12 ML/MIN/1.73M2
GFR, ESTIMATED: 13 ML/MIN/1.73M2
GLUCOSE BLD-MCNC: 120 MG/DL (ref 74–99)
GLUCOSE BLD-MCNC: 206 MG/DL (ref 74–99)
GLUCOSE BLD-MCNC: 309 MG/DL (ref 74–99)
GLUCOSE SERPL-MCNC: 193 MG/DL (ref 74–99)
GLUCOSE SERPL-MCNC: 251 MG/DL (ref 74–99)
HBA1C MFR BLD: 7.5 % (ref 4–5.6)
HCT VFR BLD AUTO: 38.8 % (ref 37–54)
HGB BLD-MCNC: 12.1 G/DL (ref 12.5–16.5)
LYMPHOCYTES NFR BLD: 0.41 K/UL (ref 1.5–4)
LYMPHOCYTES RELATIVE PERCENT: 6 % (ref 20–42)
MCH RBC QN AUTO: 29.2 PG (ref 26–35)
MCHC RBC AUTO-ENTMCNC: 31.2 G/DL (ref 32–34.5)
MCV RBC AUTO: 93.5 FL (ref 80–99.9)
MONOCYTES NFR BLD: 0.12 K/UL (ref 0.1–0.95)
MONOCYTES NFR BLD: 2 % (ref 2–12)
NEUTROPHILS NFR BLD: 91 % (ref 43–80)
NEUTS SEG NFR BLD: 6.21 K/UL (ref 1.8–7.3)
PLATELET # BLD AUTO: 256 K/UL (ref 130–450)
PMV BLD AUTO: 10.5 FL (ref 7–12)
POTASSIUM SERPL-SCNC: 5.6 MMOL/L (ref 3.5–5)
POTASSIUM SERPL-SCNC: 6.2 MMOL/L (ref 3.5–5)
PROT SERPL-MCNC: 6.2 G/DL (ref 6.4–8.3)
RBC # BLD AUTO: 4.15 M/UL (ref 3.8–5.8)
RBC # BLD: ABNORMAL 10*6/UL
RBC # BLD: ABNORMAL 10*6/UL
SODIUM SERPL-SCNC: 132 MMOL/L (ref 132–146)
SODIUM SERPL-SCNC: 134 MMOL/L (ref 132–146)
TROPONIN I SERPL HS-MCNC: 93 NG/L (ref 0–11)
WBC OTHER # BLD: 6.8 K/UL (ref 4.5–11.5)

## 2024-08-24 PROCEDURE — 2580000003 HC RX 258

## 2024-08-24 PROCEDURE — 80048 BASIC METABOLIC PNL TOTAL CA: CPT

## 2024-08-24 PROCEDURE — 94640 AIRWAY INHALATION TREATMENT: CPT

## 2024-08-24 PROCEDURE — 93005 ELECTROCARDIOGRAM TRACING: CPT

## 2024-08-24 PROCEDURE — 2060000000 HC ICU INTERMEDIATE R&B

## 2024-08-24 PROCEDURE — 6360000002 HC RX W HCPCS: Performed by: INTERNAL MEDICINE

## 2024-08-24 PROCEDURE — 71045 X-RAY EXAM CHEST 1 VIEW: CPT

## 2024-08-24 PROCEDURE — 2580000003 HC RX 258: Performed by: INTERNAL MEDICINE

## 2024-08-24 PROCEDURE — 6360000002 HC RX W HCPCS

## 2024-08-24 PROCEDURE — 84484 ASSAY OF TROPONIN QUANT: CPT

## 2024-08-24 PROCEDURE — 85025 COMPLETE CBC W/AUTO DIFF WBC: CPT

## 2024-08-24 PROCEDURE — 2700000000 HC OXYGEN THERAPY PER DAY

## 2024-08-24 PROCEDURE — 5A1D70Z PERFORMANCE OF URINARY FILTRATION, INTERMITTENT, LESS THAN 6 HOURS PER DAY: ICD-10-PCS | Performed by: EMERGENCY MEDICINE

## 2024-08-24 PROCEDURE — 6370000000 HC RX 637 (ALT 250 FOR IP)

## 2024-08-24 PROCEDURE — 80053 COMPREHEN METABOLIC PANEL: CPT

## 2024-08-24 PROCEDURE — 6370000000 HC RX 637 (ALT 250 FOR IP): Performed by: INTERNAL MEDICINE

## 2024-08-24 PROCEDURE — 82962 GLUCOSE BLOOD TEST: CPT

## 2024-08-24 PROCEDURE — 84145 PROCALCITONIN (PCT): CPT

## 2024-08-24 PROCEDURE — 96374 THER/PROPH/DIAG INJ IV PUSH: CPT

## 2024-08-24 PROCEDURE — 36415 COLL VENOUS BLD VENIPUNCTURE: CPT

## 2024-08-24 PROCEDURE — 99285 EMERGENCY DEPT VISIT HI MDM: CPT

## 2024-08-24 PROCEDURE — 83036 HEMOGLOBIN GLYCOSYLATED A1C: CPT

## 2024-08-24 RX ORDER — PANTOPRAZOLE SODIUM 40 MG/1
40 TABLET, DELAYED RELEASE ORAL DAILY
Status: DISCONTINUED | OUTPATIENT
Start: 2024-08-24 | End: 2024-08-31 | Stop reason: HOSPADM

## 2024-08-24 RX ORDER — IPRATROPIUM BROMIDE AND ALBUTEROL SULFATE 2.5; .5 MG/3ML; MG/3ML
1 SOLUTION RESPIRATORY (INHALATION)
Status: DISCONTINUED | OUTPATIENT
Start: 2024-08-24 | End: 2024-08-24

## 2024-08-24 RX ORDER — PROCHLORPERAZINE EDISYLATE 5 MG/ML
5 INJECTION INTRAMUSCULAR; INTRAVENOUS EVERY 6 HOURS PRN
Status: DISCONTINUED | OUTPATIENT
Start: 2024-08-24 | End: 2024-08-31 | Stop reason: HOSPADM

## 2024-08-24 RX ORDER — SODIUM CHLORIDE 9 MG/ML
INJECTION, SOLUTION INTRAVENOUS CONTINUOUS
Status: DISCONTINUED | OUTPATIENT
Start: 2024-08-24 | End: 2024-08-26

## 2024-08-24 RX ORDER — ACETAMINOPHEN 500 MG
500 TABLET ORAL EVERY 6 HOURS PRN
Status: DISCONTINUED | OUTPATIENT
Start: 2024-08-24 | End: 2024-08-31 | Stop reason: HOSPADM

## 2024-08-24 RX ORDER — METOPROLOL SUCCINATE 50 MG/1
50 TABLET, EXTENDED RELEASE ORAL DAILY
Status: DISCONTINUED | OUTPATIENT
Start: 2024-08-24 | End: 2024-08-31 | Stop reason: HOSPADM

## 2024-08-24 RX ORDER — ALBUTEROL SULFATE 0.83 MG/ML
2.5 SOLUTION RESPIRATORY (INHALATION) EVERY 6 HOURS PRN
Status: DISCONTINUED | OUTPATIENT
Start: 2024-08-24 | End: 2024-08-31 | Stop reason: HOSPADM

## 2024-08-24 RX ORDER — SODIUM CHLORIDE 0.9 % (FLUSH) 0.9 %
SYRINGE (ML) INJECTION
Status: COMPLETED
Start: 2024-08-24 | End: 2024-08-24

## 2024-08-24 RX ORDER — INSULIN LISPRO 100 [IU]/ML
0-8 INJECTION, SOLUTION INTRAVENOUS; SUBCUTANEOUS
Status: DISCONTINUED | OUTPATIENT
Start: 2024-08-24 | End: 2024-08-31 | Stop reason: HOSPADM

## 2024-08-24 RX ORDER — DEXTROSE MONOHYDRATE 100 MG/ML
INJECTION, SOLUTION INTRAVENOUS CONTINUOUS PRN
Status: DISCONTINUED | OUTPATIENT
Start: 2024-08-24 | End: 2024-08-31 | Stop reason: HOSPADM

## 2024-08-24 RX ORDER — ALBUTEROL SULFATE 90 UG/1
2 AEROSOL, METERED RESPIRATORY (INHALATION) EVERY 6 HOURS PRN
Status: DISCONTINUED | OUTPATIENT
Start: 2024-08-24 | End: 2024-08-24 | Stop reason: CLARIF

## 2024-08-24 RX ORDER — M-VIT,TX,IRON,MINS/CALC/FOLIC 27MG-0.4MG
1 TABLET ORAL DAILY
Status: DISCONTINUED | OUTPATIENT
Start: 2024-08-24 | End: 2024-08-31 | Stop reason: HOSPADM

## 2024-08-24 RX ORDER — HEPARIN SODIUM 5000 [USP'U]/ML
5000 INJECTION, SOLUTION INTRAVENOUS; SUBCUTANEOUS EVERY 8 HOURS SCHEDULED
Status: DISCONTINUED | OUTPATIENT
Start: 2024-08-24 | End: 2024-08-31 | Stop reason: HOSPADM

## 2024-08-24 RX ORDER — GLUCAGON 1 MG/ML
1 KIT INJECTION PRN
Status: DISCONTINUED | OUTPATIENT
Start: 2024-08-24 | End: 2024-08-31 | Stop reason: HOSPADM

## 2024-08-24 RX ORDER — POLYETHYLENE GLYCOL 3350 17 G/17G
17 POWDER, FOR SOLUTION ORAL DAILY PRN
Status: DISCONTINUED | OUTPATIENT
Start: 2024-08-24 | End: 2024-08-31 | Stop reason: HOSPADM

## 2024-08-24 RX ORDER — ATORVASTATIN CALCIUM 40 MG/1
80 TABLET, FILM COATED ORAL NIGHTLY
Status: DISCONTINUED | OUTPATIENT
Start: 2024-08-24 | End: 2024-08-31 | Stop reason: HOSPADM

## 2024-08-24 RX ORDER — EZETIMIBE 10 MG/1
10 TABLET ORAL DAILY
Status: DISCONTINUED | OUTPATIENT
Start: 2024-08-24 | End: 2024-08-31 | Stop reason: HOSPADM

## 2024-08-24 RX ORDER — ALOGLIPTIN 6.25 MG/1
6.25 TABLET, FILM COATED ORAL DAILY
Status: DISCONTINUED | OUTPATIENT
Start: 2024-08-24 | End: 2024-08-31 | Stop reason: HOSPADM

## 2024-08-24 RX ORDER — ISOSORBIDE MONONITRATE 30 MG/1
30 TABLET, EXTENDED RELEASE ORAL DAILY
Status: DISCONTINUED | OUTPATIENT
Start: 2024-08-24 | End: 2024-08-31 | Stop reason: HOSPADM

## 2024-08-24 RX ORDER — ASPIRIN 81 MG/1
81 TABLET ORAL DAILY
Status: DISCONTINUED | OUTPATIENT
Start: 2024-08-24 | End: 2024-08-31 | Stop reason: HOSPADM

## 2024-08-24 RX ORDER — INSULIN LISPRO 100 [IU]/ML
0-4 INJECTION, SOLUTION INTRAVENOUS; SUBCUTANEOUS NIGHTLY
Status: DISCONTINUED | OUTPATIENT
Start: 2024-08-24 | End: 2024-08-31 | Stop reason: HOSPADM

## 2024-08-24 RX ORDER — BUPROPION HYDROCHLORIDE 150 MG/1
300 TABLET ORAL EVERY MORNING
Status: DISCONTINUED | OUTPATIENT
Start: 2024-08-24 | End: 2024-08-31 | Stop reason: HOSPADM

## 2024-08-24 RX ADMIN — SODIUM CHLORIDE: 9 INJECTION, SOLUTION INTRAVENOUS at 15:43

## 2024-08-24 RX ADMIN — IPRATROPIUM BROMIDE AND ALBUTEROL SULFATE 1 DOSE: .5; 2.5 SOLUTION RESPIRATORY (INHALATION) at 09:08

## 2024-08-24 RX ADMIN — METOPROLOL SUCCINATE 50 MG: 50 TABLET, EXTENDED RELEASE ORAL at 14:20

## 2024-08-24 RX ADMIN — WATER 125 MG: 1 INJECTION INTRAMUSCULAR; INTRAVENOUS; SUBCUTANEOUS at 09:18

## 2024-08-24 RX ADMIN — HEPARIN SODIUM 5000 UNITS: 5000 INJECTION INTRAVENOUS; SUBCUTANEOUS at 21:48

## 2024-08-24 RX ADMIN — BUPROPION HYDROCHLORIDE 300 MG: 150 TABLET, EXTENDED RELEASE ORAL at 14:20

## 2024-08-24 RX ADMIN — SODIUM ZIRCONIUM CYCLOSILICATE 10 G: 10 POWDER, FOR SUSPENSION ORAL at 14:08

## 2024-08-24 RX ADMIN — PANTOPRAZOLE SODIUM 40 MG: 40 TABLET, DELAYED RELEASE ORAL at 14:20

## 2024-08-24 RX ADMIN — ASPIRIN 81 MG: 81 TABLET, COATED ORAL at 14:20

## 2024-08-24 RX ADMIN — EZETIMIBE 10 MG: 10 TABLET ORAL at 14:20

## 2024-08-24 RX ADMIN — INSULIN LISPRO 4 UNITS: 100 INJECTION, SOLUTION INTRAVENOUS; SUBCUTANEOUS at 21:47

## 2024-08-24 RX ADMIN — ALOGLIPTIN 6.25 MG: 6.25 TABLET, FILM COATED ORAL at 14:20

## 2024-08-24 RX ADMIN — ARFORMOTEROL TARTRATE: 15 SOLUTION RESPIRATORY (INHALATION) at 17:02

## 2024-08-24 RX ADMIN — ATORVASTATIN CALCIUM 80 MG: 40 TABLET, FILM COATED ORAL at 21:48

## 2024-08-24 RX ADMIN — SODIUM CHLORIDE, PRESERVATIVE FREE 10 ML: 5 INJECTION INTRAVENOUS at 14:10

## 2024-08-24 RX ADMIN — Medication 1 TABLET: at 14:20

## 2024-08-24 RX ADMIN — INSULIN HUMAN 6 UNITS: 100 INJECTION, SOLUTION PARENTERAL at 14:07

## 2024-08-24 RX ADMIN — ISOSORBIDE MONONITRATE 30 MG: 30 TABLET, EXTENDED RELEASE ORAL at 14:20

## 2024-08-24 RX ADMIN — HEPARIN SODIUM 5000 UNITS: 5000 INJECTION INTRAVENOUS; SUBCUTANEOUS at 15:35

## 2024-08-24 ASSESSMENT — PAIN SCALES - GENERAL
PAINLEVEL_OUTOF10: 0
PAINLEVEL_OUTOF10: 0

## 2024-08-24 NOTE — PROGRESS NOTES
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 Patient was confused and thought he had woken at home and wanted to smoke, states \"I didn't think, I just did it.\" Cigarettes and lighter placed in patient bin in med room, patient reoriented and educated on the matter.

## 2024-08-24 NOTE — ED PROVIDER NOTES
extended release tablet Take 1 tablet by mouth every morning      ezetimibe (ZETIA) 10 MG tablet Take 1 tablet by mouth daily      Nutritional Supplements (GLUCERNA) LIQD Take 1 Canister by mouth Daily with lunch      pantoprazole (PROTONIX) 40 MG tablet Take 1 tablet by mouth daily      isosorbide mononitrate (IMDUR) 30 MG extended release tablet Take 1 tablet by mouth daily      metoprolol succinate (TOPROL XL) 50 MG extended release tablet Take 1 tablet by mouth daily      Multiple Vitamins-Minerals (THERAPEUTIC MULTIVITAMIN-MINERALS) tablet Take 1 tablet by mouth daily             ALLERGIES     Patient has no known allergies.    FAMILYHISTORY     History reviewed. No pertinent family history.     SOCIAL HISTORY       Social History     Tobacco Use    Smoking status: Every Day     Current packs/day: 0.50     Types: Cigarettes    Smokeless tobacco: Never   Vaping Use    Vaping status: Never Used   Substance Use Topics    Alcohol use: Never    Drug use: Not Currently       SCREENINGS     Is this patient to be included in the SEP-1 core measure? No   Exclusion criteria - the patient is NOT to be included for SEP-1 Core Measure due to:  Infection is not suspected     PHYSICAL EXAM  1 or more Elements     ED Triage Vitals [08/24/24 0746]   BP Systolic BP Percentile Diastolic BP Percentile Temp Temp src Pulse Respirations SpO2   (!) 158/101 -- -- 98.8 °F (37.1 °C) -- 89 24 (!) 88 %      Height Weight         -- --             Constitutional/General: Alert and oriented x3, no acute distress  Head: Normocephalic and atraumatic  Eyes: PERRL, EOMI, conjunctiva normal, sclera non icteric  Respiratory: Lungs clear to auscultation bilaterally, no wheezes, rales, or rhonchi. Not in respiratory distress  Cardiovascular:  Regular rate. Regular rhythm. 2+ distal pulses. Equal extremity pulses  Chest: No chest wall tenderness  GI:  Abdomen Soft, Non tender, Non distended.  No rebound, guarding, or rigidity  Musculoskeletal: Moves  fractures, or mediastinal widening noted.    Interpretation per the Radiologist below, if available at the time of this note:    XR CHEST PORTABLE   Final Result   1. Right perihilar and right lower lobe airspace disease   2. Moderate right pleural effusion   3. Patchy left lower lobe airspace disease   4. Mild cardiomegaly.             PROCEDURES   Unless otherwise noted below, none      Medical Decision Making/Differential Diagnosis:   CC/HPI Summary, DDx, ED Course, Reassessment, Tests Considered, Patient expectation:   74 y.o. male who presents to the emergency department with chief complaint of shortness of breath.  On arrival in emergency department patient is having mild dyspnea and is given 125 mg of IV Solu-Medrol as well as 1 DuoNeb treatment.  Metabolic panel does demonstrate renal disease with hyperkalemia and patient was given hyperkalemia protocol.  No other metabolic derangements or LFT changes and CBC without leukocytosis or anemia.  Troponin is at patient's baseline and EKG is normal.  Chest x-ray without significant findings.  Called and spoke with hospitalist who accepts patient for admission for acute hypoxia with respiratory failure.    Extensively reviewed lab and imaging findings with patient/family members/available representative parties and all questions answered at this time to the fullest extent possible.  Shared decision making utilized with patient and present parties, and due to their presenting conditions patient to be admitted to the hospital for inpatient management and monitoring.  Patient has remained closely monitored with multiple reevaluations and complex medical decision making throughout the course of their emergency department stay.  They have clinically improved and have hemodynamically improved.  After emergency department management is complete, patient to be admitted to the hospital.    Differential diagnosis includes but is not limited to: CHF exacerbation, COPD  Determinants affecting Dx or Tx: Stress    Records Reviewed: Echocardiogram from 8/20/2024 reviewed with ejection fraction calculation of 15 to 20%.    I am the Primary Clinician of Record.    CONSULTS: (Who and What was discussed)  IP CONSULT TO NEPHROLOGY    FINAL IMPRESSION      1. Acute respiratory failure with hypoxia (HCC)    2. COPD exacerbation (HCC)          DISPOSITION/PLAN   DISPOSITION Admitted 08/24/2024 09:33:42 AM    PATIENT REFERRED TO:  No follow-up provider specified.    DISCHARGE MEDICATIONS:  Current Discharge Medication List               (Please note that portions of this note were completed with a voice recognition program.  Efforts were made to edit the dictations but occasionally words are mis-transcribed.)    Fracisco Chang DO (electronically signed)

## 2024-08-24 NOTE — PROGRESS NOTES
Nephrology Note  Gómez Hammonds MD          Patient seen and examined in the emergency room.  No family member is present at bedside.  Chart reviewed.  Patient had eloped from the hospital earlier today and now presents back with shortness of breath.  He has been given intravenous loop diuretic..Appetite good. No nausea or dysguesia.   Awake and alert .   In no acute distress.    Vital SignsBP (!) 151/93   Pulse (!) 104   Temp 98.8 °F (37.1 °C)   Resp 21   SpO2 93%   24HR INTAKE/OUTPUT:  No intake or output data in the 24 hours ending 08/24/24 1332        PHYSICAL EXAM        Neck: No JVD  Lungs: Breath sounds decreased at the bases.  Scant basilar rales present.  Few rhonchi.  Heart: Regular rate and rhythm. No S3 gallop. No  murmrur.  Abdomen: Soft non distended, non tender and normal bowel sounds.  Extremeties:   No edema.            Current Facility-Administered Medications   Medication Dose Route Frequency Provider Last Rate Last Admin    atorvastatin (LIPITOR) tablet 80 mg  80 mg Oral Nightly Mauro Du A, DO        aspirin EC tablet 81 mg  81 mg Oral Daily Mauro Du A, DO        buPROPion (WELLBUTRIN XL) extended release tablet 300 mg  300 mg Oral QAM Mauro Du A, DO        ezetimibe (ZETIA) tablet 10 mg  10 mg Oral Daily Carlos Dulas A, DO        pantoprazole (PROTONIX) tablet 40 mg  40 mg Oral Daily Mauro Du A, DO        isosorbide mononitrate (IMDUR) extended release tablet 30 mg  30 mg Oral Daily Mauro Du A, DO        metoprolol succinate (TOPROL XL) extended release tablet 50 mg  50 mg Oral Daily Mauro Du A, DO        therapeutic multivitamin-minerals 1 tablet  1 tablet Oral Daily Mauro Du A, DO        arformoterol 15 mcg-budesonide 0.5 mg neb solution   Nebulization BID RT Mauro Du A, DO        glucose chewable tablet 16 g  4 tablet Oral PRN Mauro Du A, DO        dextrose bolus 10% 125 mL  125 mL IntraVENous PRN Florian  Mauro A, DO        Or    dextrose bolus 10% 250 mL  250 mL IntraVENous PRN Du, Mauro A, DO        glucagon injection 1 mg  1 mg SubCUTAneous PRN Carlos Dulas A, DO        dextrose 10 % infusion   IntraVENous Continuous PRN Carlos Dulas A, DO        insulin lispro (HUMALOG,ADMELOG) injection vial 0-8 Units  0-8 Units SubCUTAneous TID WC Carlos Dulas A, DO        insulin lispro (HUMALOG,ADMELOG) injection vial 0-4 Units  0-4 Units SubCUTAneous Nightly Mauro Du A, DO        acetaminophen (TYLENOL) tablet 500 mg  500 mg Oral Q6H PRN Du, Mauro A, DO        heparin (porcine) injection 5,000 Units  5,000 Units SubCUTAneous 3 times per day Mauro Du A, DO        prochlorperazine (COMPAZINE) injection 5 mg  5 mg IntraVENous Q6H PRN Du, Mauro A, DO        polyethylene glycol (GLYCOLAX) packet 17 g  17 g Oral Daily PRN Carlos Dulas A, DO        0.9 % sodium chloride infusion   IntraVENous Continuous Mauro Du A, DO        alogliptin (NESINA) tablet 6.25 mg  6.25 mg Oral Daily Florian Mauro A, DO        albuterol (PROVENTIL) (2.5 MG/3ML) 0.083% nebulizer solution 2.5 mg  2.5 mg Nebulization Q6H PRN Mauro Du A, DO         Current Outpatient Medications   Medication Sig Dispense Refill    atorvastatin (LIPITOR) 80 MG tablet Take 1 tablet by mouth daily      albuterol sulfate HFA (VENTOLIN HFA) 108 (90 Base) MCG/ACT inhaler Inhale 2 puffs into the lungs every 6 hours as needed for Wheezing or Shortness of Breath      aspirin 81 MG EC tablet Take 1 tablet by mouth daily      budesonide-formoterol (SYMBICORT) 160-4.5 MCG/ACT AERO Inhale 1 puff into the lungs 2 times daily      buPROPion (WELLBUTRIN XL) 300 MG extended release tablet Take 1 tablet by mouth every morning      ezetimibe (ZETIA) 10 MG tablet Take 1 tablet by mouth daily      Nutritional Supplements (GLUCERNA) LIQD Take 1 Canister by mouth Daily with lunch      pantoprazole (PROTONIX) 40 MG tablet Take 1 tablet  patient on vitamin D supplement.    Chronic kidney disease stage G4 of unknown etiology but likely secondary to hypertensive kidney disease  with a baseline serum creatinine of 3.3 mg/dL to 3.6 mg/dL.      Discussed with Dr. Du.        Gómez Hammonds MD  Nephrology  Electronically signed by Gómez Hammonds MD on 8/24/2024 at 1:32 PM      Note: This report was completed utilizing computer voice recognition software. Every effort has been made to ensure accuracy, however; inadvertent computerized transcription errors may be present.

## 2024-08-24 NOTE — H&P
Department of Internal Medicine        CHIEF COMPLAINT:  + SOB with exertion, intermittent upper abdominal pain    Reason for Admission: Acute renal failure, CHF    HISTORY OF PRESENT ILLNESS:      The patient is a 74 y.o. male who presents with increased shortness of breath has been going on for about a month.  Patient was seen in the ED 2 days ago with the same symptoms of shortness of breath when he lays down and when he ambulates.  Patient said the symptoms going on for about a month.  He denies any chest pain or palpitations or dizziness.  He denies any fever/chills or productive cough.  He denies any history of heart problems.  Patient's BUN/creatinine and was elevated at 30/4.4 with.  No history of kidney disease in the past.  His proBNP was greater than 70,000.  Troponin was 81.  CBC was normal.  Patient random blood sugar though was also 253.  Patient signed out AMA but came back into the hospital because his [facility] physician/nurse practitioner and told to come back in.  Patient states symptoms about the same with a WBC 6.3 and hemoglobin 13.0 today.  Pending repeat BMP today.  Temperature was 98 with a heart rate of 110 and blood pressure 94/78.  O2 sat 98% on room air at rest.  Chest x-ray was ordered and pending.  The patient did have a CTA of the lungs that on the 17th which showed no evidence of PE but did show bilateral pleural effusion.    8/20/2024  Patient seen examined on PCU.  Patient states he feels a lot better today.  Patient denies any current chest pain, abdominal pain, nausea or vomiting or unusual shortness of breath.  BUN/creatinine was 29/4.3.  Blood sugars range .  Hemoglobin A1c 7.4.  WBC 5.7 with hemoglobin 11.7.  Temperature 98.3 with heart rate 84 blood pressure 136/65.  O2 sat 94% on room air at rest.  CT abdomen pelvis yesterday showed no acute renal obstruction.  Possible constipation with moderate bladder distention.  Bladder scan today.  Pending echocardiogram  results.    8/21/2024  Patient seen and examined on PCU.  Blood sugars range 134-228.  BUN/creatinine 35/4.8 with proBNP improved to 51,000.  Liver enzymes normal with WBC of 5.4 and hemoglobin 11.3.  Temperature 97.8 with heart rate 80 and blood pressure 137/68.  O2 sat 97% on room air at rest.  Echocardiogram shows EF 15-20% with severe eccentric hypertrophy.  There was grade 2 diastolic dysfunction.  There was mild MR. Will consult cardiology with the patient having severe systolic cardiomyopathy along with elevated proBNP.  Also consult nephrology with patient's baseline creatinine being 3.3 and currently BUN/creatinine are increasing.    8/22/2024  Patient seen examined on PCU.  BUN/creatinine 39/4.7 potassium 4.6.  Fasting blood sugar 174.  proBNP yesterday was 51,000.  Liver enzymes normal with WBC of 4.7 hemoglobin 11.5.  Temperature 97.3 with heart rate 77 blood pressure 150/76.  O2 sat 100% on room air at rest.  Urine output ranges 200-725 cc this shift.    8/23/2024  Patient seen examined on PCU.  Patient says he feels fairly good but a little bit weak compared to his baseline.  Patient denies any chest pain, abdominal pain, nausea/vomiting or unusual shortness of breath.  BUN/creatinine 43/4.7 with blood sugars range 130-239.  Transaminase normal with WBC 6.0 and hemoglobin 11.6.  Temperature is 98.3 with heart rate of 84 blood pressure 130/74.  O2 sat 94% on room air at rest.  Urine output ranges 200-725 cc shift.  Urine culture shows mixed alexandrea with gram-positive organisms.  Case discussed with nephrology today.    8/24/2024  Patient left AMA earlier this morning.  The patient came back to the hospital with got couple hours stating is very short of breath.  Patient's O2 saturation was 91% on 2 L.  Chest x-ray in the ED showed right perihilar and right lower lobe airspace disease with moderate right pleural effusion with patchy left lower lobe airspace disease.  Patient's potassium was 6.2 with  COPD  Non-insulin-dependent diabetes mellitus with hyperglycemia-random blood sugar greater than 250 - hemoglobin A1c 7.4  Orthostatic hypotension  Bacteriuria-urine culture showed mixed alexandrea with gram-positive organisms  History of hyperlipidemia  History of hypertension with hypotension on admission-94/78-resolved  History of depression  History of coronary artery disease  History of peripheral vascular disease    Plan:  Patient admitted to monitored bed  Home medication reviewed  Activity up ad isrrael.  Hemoglobin A1c  Heparin 5000 units subcu every 8  Compazine 5 mg IV push every 6 hours as needed for nausea  Glucoscans 4 times daily with sliding scale insulin  General Diet with salt restriction  Accurate I's and O's  Echocardiogram  DuoNeb aerosols 4 times daily  Pulmicort aerosol twice daily  CT of the abdomen pelvis with oral contrast  Spot urine sodium  Spot urine creatinine  Urine for eosinophils    Bladder scan-84 cc on 8/20    Cardiology, nephrology consulted  Urine culture    Doxycycline 100 mg twice daily p.o. x 5 days    Patient  left AMA 8/24 about 5 AM nurses on the floor looked for the patient and he left the hospital.  They called his cell phone and he says I ran away to home.    Patient came back to ED 8/24 about 7 AM because of shortness of breath  IV fluids were stopped  Lasix 6 mg IV push x 1  Procalcitonin    CMP, CBC in a.m.      Mauro Du DO, D.OByron  8/24/2024  11:53 AM

## 2024-08-24 NOTE — PROGRESS NOTES
Pt was found to have eloped, searched floor and called code brown, called pt cell phone and states \" I ran away to home.\"

## 2024-08-25 LAB
ALBUMIN SERPL-MCNC: 2.3 G/DL (ref 3.5–5.2)
ALP SERPL-CCNC: 83 U/L (ref 40–129)
ALT SERPL-CCNC: 17 U/L (ref 0–40)
ANION GAP SERPL CALCULATED.3IONS-SCNC: 12 MMOL/L (ref 7–16)
ANION GAP SERPL CALCULATED.3IONS-SCNC: 9 MMOL/L (ref 7–16)
AST SERPL-CCNC: 33 U/L (ref 0–39)
B PARAP IS1001 DNA NPH QL NAA+NON-PROBE: NOT DETECTED
B PERT DNA SPEC QL NAA+PROBE: NOT DETECTED
BASOPHILS # BLD: 0.01 K/UL (ref 0–0.2)
BASOPHILS NFR BLD: 0 % (ref 0–2)
BILIRUB SERPL-MCNC: 0.3 MG/DL (ref 0–1.2)
BNP SERPL-MCNC: ABNORMAL PG/ML (ref 0–450)
BUN SERPL-MCNC: 52 MG/DL (ref 6–23)
BUN SERPL-MCNC: 54 MG/DL (ref 6–23)
C PNEUM DNA NPH QL NAA+NON-PROBE: NOT DETECTED
CALCIUM SERPL-MCNC: 8.4 MG/DL (ref 8.6–10.2)
CALCIUM SERPL-MCNC: 8.7 MG/DL (ref 8.6–10.2)
CHLORIDE SERPL-SCNC: 106 MMOL/L (ref 98–107)
CHLORIDE SERPL-SCNC: 99 MMOL/L (ref 98–107)
CO2 SERPL-SCNC: 18 MMOL/L (ref 22–29)
CO2 SERPL-SCNC: 20 MMOL/L (ref 22–29)
CREAT SERPL-MCNC: 5 MG/DL (ref 0.7–1.2)
CREAT SERPL-MCNC: 5.2 MG/DL (ref 0.7–1.2)
CRP SERPL HS-MCNC: 35 MG/L (ref 0–5)
EKG ATRIAL RATE: 92 BPM
EKG P AXIS: 73 DEGREES
EKG P-R INTERVAL: 166 MS
EKG Q-T INTERVAL: 382 MS
EKG QRS DURATION: 120 MS
EKG QTC CALCULATION (BAZETT): 472 MS
EKG R AXIS: 109 DEGREES
EKG T AXIS: 18 DEGREES
EKG VENTRICULAR RATE: 92 BPM
EOSINOPHIL # BLD: 0.01 K/UL (ref 0.05–0.5)
EOSINOPHILS RELATIVE PERCENT: 0 % (ref 0–6)
ERYTHROCYTE [DISTWIDTH] IN BLOOD BY AUTOMATED COUNT: 14.4 % (ref 11.5–15)
FLUAV RNA NPH QL NAA+NON-PROBE: NOT DETECTED
FLUBV RNA NPH QL NAA+NON-PROBE: NOT DETECTED
GFR, ESTIMATED: 11 ML/MIN/1.73M2
GFR, ESTIMATED: 11 ML/MIN/1.73M2
GLUCOSE BLD-MCNC: 139 MG/DL (ref 74–99)
GLUCOSE BLD-MCNC: 174 MG/DL (ref 74–99)
GLUCOSE BLD-MCNC: 194 MG/DL (ref 74–99)
GLUCOSE BLD-MCNC: 197 MG/DL (ref 74–99)
GLUCOSE SERPL-MCNC: 119 MG/DL (ref 74–99)
GLUCOSE SERPL-MCNC: 197 MG/DL (ref 74–99)
HADV DNA NPH QL NAA+NON-PROBE: NOT DETECTED
HCOV 229E RNA NPH QL NAA+NON-PROBE: NOT DETECTED
HCOV HKU1 RNA NPH QL NAA+NON-PROBE: NOT DETECTED
HCOV NL63 RNA NPH QL NAA+NON-PROBE: NOT DETECTED
HCOV OC43 RNA NPH QL NAA+NON-PROBE: NOT DETECTED
HCT VFR BLD AUTO: 31.9 % (ref 37–54)
HGB BLD-MCNC: 10.2 G/DL (ref 12.5–16.5)
HMPV RNA NPH QL NAA+NON-PROBE: NOT DETECTED
HPIV1 RNA NPH QL NAA+NON-PROBE: NOT DETECTED
HPIV2 RNA NPH QL NAA+NON-PROBE: NOT DETECTED
HPIV3 RNA NPH QL NAA+NON-PROBE: NOT DETECTED
HPIV4 RNA NPH QL NAA+NON-PROBE: NOT DETECTED
IMM GRANULOCYTES # BLD AUTO: <0.03 K/UL (ref 0–0.58)
IMM GRANULOCYTES NFR BLD: 0 % (ref 0–5)
LYMPHOCYTES NFR BLD: 0.61 K/UL (ref 1.5–4)
LYMPHOCYTES RELATIVE PERCENT: 9 % (ref 20–42)
M PNEUMO DNA NPH QL NAA+NON-PROBE: NOT DETECTED
MAGNESIUM SERPL-MCNC: 1.8 MG/DL (ref 1.6–2.6)
MCH RBC QN AUTO: 29.9 PG (ref 26–35)
MCHC RBC AUTO-ENTMCNC: 32 G/DL (ref 32–34.5)
MCV RBC AUTO: 93.5 FL (ref 80–99.9)
MONOCYTES NFR BLD: 0.55 K/UL (ref 0.1–0.95)
MONOCYTES NFR BLD: 8 % (ref 2–12)
NEUTROPHILS NFR BLD: 83 % (ref 43–80)
NEUTS SEG NFR BLD: 5.87 K/UL (ref 1.8–7.3)
PHOSPHATE SERPL-MCNC: 4.7 MG/DL (ref 2.5–4.5)
PLATELET # BLD AUTO: 228 K/UL (ref 130–450)
PMV BLD AUTO: 11.1 FL (ref 7–12)
POTASSIUM SERPL-SCNC: 5.3 MMOL/L (ref 3.5–5)
POTASSIUM SERPL-SCNC: 5.3 MMOL/L (ref 3.5–5)
PROCALCITONIN SERPL-MCNC: 0.17 NG/ML (ref 0–0.08)
PROCALCITONIN SERPL-MCNC: 0.22 NG/ML (ref 0–0.08)
PROT SERPL-MCNC: 5 G/DL (ref 6.4–8.3)
RBC # BLD AUTO: 3.41 M/UL (ref 3.8–5.8)
RSV RNA NPH QL NAA+NON-PROBE: NOT DETECTED
RV+EV RNA NPH QL NAA+NON-PROBE: NOT DETECTED
SARS-COV-2 RNA NPH QL NAA+NON-PROBE: NOT DETECTED
SODIUM SERPL-SCNC: 131 MMOL/L (ref 132–146)
SODIUM SERPL-SCNC: 133 MMOL/L (ref 132–146)
SPECIMEN DESCRIPTION: NORMAL
WBC OTHER # BLD: 7.1 K/UL (ref 4.5–11.5)

## 2024-08-25 PROCEDURE — 6370000000 HC RX 637 (ALT 250 FOR IP): Performed by: INTERNAL MEDICINE

## 2024-08-25 PROCEDURE — 82962 GLUCOSE BLOOD TEST: CPT

## 2024-08-25 PROCEDURE — 85025 COMPLETE CBC W/AUTO DIFF WBC: CPT

## 2024-08-25 PROCEDURE — 2060000000 HC ICU INTERMEDIATE R&B

## 2024-08-25 PROCEDURE — 84145 PROCALCITONIN (PCT): CPT

## 2024-08-25 PROCEDURE — 83880 ASSAY OF NATRIURETIC PEPTIDE: CPT

## 2024-08-25 PROCEDURE — 87081 CULTURE SCREEN ONLY: CPT

## 2024-08-25 PROCEDURE — 2700000000 HC OXYGEN THERAPY PER DAY

## 2024-08-25 PROCEDURE — 6360000002 HC RX W HCPCS: Performed by: INTERNAL MEDICINE

## 2024-08-25 PROCEDURE — 80048 BASIC METABOLIC PNL TOTAL CA: CPT

## 2024-08-25 PROCEDURE — 83735 ASSAY OF MAGNESIUM: CPT

## 2024-08-25 PROCEDURE — 0202U NFCT DS 22 TRGT SARS-COV-2: CPT

## 2024-08-25 PROCEDURE — 86140 C-REACTIVE PROTEIN: CPT

## 2024-08-25 PROCEDURE — 2580000003 HC RX 258: Performed by: INTERNAL MEDICINE

## 2024-08-25 PROCEDURE — 84100 ASSAY OF PHOSPHORUS: CPT

## 2024-08-25 PROCEDURE — 36415 COLL VENOUS BLD VENIPUNCTURE: CPT

## 2024-08-25 PROCEDURE — 80053 COMPREHEN METABOLIC PANEL: CPT

## 2024-08-25 PROCEDURE — 94640 AIRWAY INHALATION TREATMENT: CPT

## 2024-08-25 PROCEDURE — 93010 ELECTROCARDIOGRAM REPORT: CPT | Performed by: INTERNAL MEDICINE

## 2024-08-25 RX ORDER — HALOPERIDOL 5 MG/ML
2 INJECTION INTRAMUSCULAR EVERY 4 HOURS PRN
Status: DISCONTINUED | OUTPATIENT
Start: 2024-08-25 | End: 2024-08-31 | Stop reason: HOSPADM

## 2024-08-25 RX ORDER — HALOPERIDOL 5 MG/ML
2 INJECTION INTRAMUSCULAR ONCE
Status: COMPLETED | OUTPATIENT
Start: 2024-08-25 | End: 2024-08-25

## 2024-08-25 RX ORDER — FUROSEMIDE 10 MG/ML
40 INJECTION INTRAMUSCULAR; INTRAVENOUS ONCE
Status: COMPLETED | OUTPATIENT
Start: 2024-08-25 | End: 2024-08-25

## 2024-08-25 RX ORDER — SODIUM BICARBONATE 650 MG/1
650 TABLET ORAL 3 TIMES DAILY
Status: DISCONTINUED | OUTPATIENT
Start: 2024-08-25 | End: 2024-08-30

## 2024-08-25 RX ORDER — DOXYCYCLINE 100 MG/1
100 CAPSULE ORAL EVERY 12 HOURS SCHEDULED
Status: DISCONTINUED | OUTPATIENT
Start: 2024-08-25 | End: 2024-08-31 | Stop reason: HOSPADM

## 2024-08-25 RX ORDER — NICOTINE 21 MG/24HR
1 PATCH, TRANSDERMAL 24 HOURS TRANSDERMAL DAILY
Status: DISCONTINUED | OUTPATIENT
Start: 2024-08-25 | End: 2024-08-31 | Stop reason: HOSPADM

## 2024-08-25 RX ADMIN — SODIUM BICARBONATE 650 MG: 650 TABLET ORAL at 21:11

## 2024-08-25 RX ADMIN — ARFORMOTEROL TARTRATE: 15 SOLUTION RESPIRATORY (INHALATION) at 18:30

## 2024-08-25 RX ADMIN — DOXYCYCLINE HYCLATE 100 MG: 100 CAPSULE ORAL at 10:00

## 2024-08-25 RX ADMIN — ALOGLIPTIN 6.25 MG: 6.25 TABLET, FILM COATED ORAL at 11:38

## 2024-08-25 RX ADMIN — HALOPERIDOL LACTATE 2 MG: 5 INJECTION, SOLUTION INTRAMUSCULAR at 04:07

## 2024-08-25 RX ADMIN — ARFORMOTEROL TARTRATE: 15 SOLUTION RESPIRATORY (INHALATION) at 05:40

## 2024-08-25 RX ADMIN — PANTOPRAZOLE SODIUM 40 MG: 40 TABLET, DELAYED RELEASE ORAL at 10:00

## 2024-08-25 RX ADMIN — SODIUM ZIRCONIUM CYCLOSILICATE 10 G: 10 POWDER, FOR SUSPENSION ORAL at 10:20

## 2024-08-25 RX ADMIN — DOXYCYCLINE HYCLATE 100 MG: 100 CAPSULE ORAL at 21:11

## 2024-08-25 RX ADMIN — Medication 1 TABLET: at 10:00

## 2024-08-25 RX ADMIN — METOPROLOL SUCCINATE 50 MG: 50 TABLET, EXTENDED RELEASE ORAL at 10:00

## 2024-08-25 RX ADMIN — ASPIRIN 81 MG: 81 TABLET, COATED ORAL at 10:00

## 2024-08-25 RX ADMIN — SODIUM BICARBONATE 650 MG: 650 TABLET ORAL at 15:04

## 2024-08-25 RX ADMIN — ATORVASTATIN CALCIUM 80 MG: 40 TABLET, FILM COATED ORAL at 21:11

## 2024-08-25 RX ADMIN — EZETIMIBE 10 MG: 10 TABLET ORAL at 11:38

## 2024-08-25 RX ADMIN — WATER 1000 MG: 1 INJECTION INTRAMUSCULAR; INTRAVENOUS; SUBCUTANEOUS at 10:20

## 2024-08-25 RX ADMIN — HEPARIN SODIUM 5000 UNITS: 5000 INJECTION INTRAVENOUS; SUBCUTANEOUS at 14:15

## 2024-08-25 RX ADMIN — ISOSORBIDE MONONITRATE 30 MG: 30 TABLET, EXTENDED RELEASE ORAL at 10:00

## 2024-08-25 RX ADMIN — FUROSEMIDE 40 MG: 10 INJECTION, SOLUTION INTRAMUSCULAR; INTRAVENOUS at 10:19

## 2024-08-25 RX ADMIN — BUPROPION HYDROCHLORIDE 300 MG: 150 TABLET, EXTENDED RELEASE ORAL at 10:00

## 2024-08-25 ASSESSMENT — PAIN SCALES - GENERAL
PAINLEVEL_OUTOF10: 0
PAINLEVEL_OUTOF10: 0

## 2024-08-25 NOTE — PLAN OF CARE
Problem: Discharge Planning  Goal: Discharge to home or other facility with appropriate resources  Outcome: Progressing     Problem: Pain  Goal: Verbalizes/displays adequate comfort level or baseline comfort level  Outcome: Progressing     Problem: Safety - Adult  Goal: Free from fall injury  Outcome: Progressing     Problem: Risk for Elopement  Goal: Patient will not exit the unit/facility without proper excort  Outcome: Progressing  Flowsheets (Taken 8/24/2024 1600 by Fide Garrido RN)  Nursing Interventions for Elopement Risk:   Communicate/escalate to /other team member the risk of elopement   Communicate/escalate to nursing supervisor the risk of elopement   Communicate/escalate to charge nurse the risk of elopement   Reduce environmental triggers

## 2024-08-25 NOTE — PROGRESS NOTES
Department of Internal Medicine        CHIEF COMPLAINT:  + SOB with exertion, intermittent upper abdominal pain    Reason for Admission: Acute renal failure, CHF    HISTORY OF PRESENT ILLNESS:      The patient is a 74 y.o. male who presents with increased shortness of breath has been going on for about a month.  Patient was seen in the ED 2 days ago with the same symptoms of shortness of breath when he lays down and when he ambulates.  Patient said the symptoms going on for about a month.  He denies any chest pain or palpitations or dizziness.  He denies any fever/chills or productive cough.  He denies any history of heart problems.  Patient's BUN/creatinine and was elevated at 30/4.4 with.  No history of kidney disease in the past.  His proBNP was greater than 70,000.  Troponin was 81.  CBC was normal.  Patient random blood sugar though was also 253.  Patient signed out AMA but came back into the hospital because his [facility] physician/nurse practitioner and told to come back in.  Patient states symptoms about the same with a WBC 6.3 and hemoglobin 13.0 today.  Pending repeat BMP today.  Temperature was 98 with a heart rate of 110 and blood pressure 94/78.  O2 sat 98% on room air at rest.  Chest x-ray was ordered and pending.  The patient did have a CTA of the lungs that on the 17th which showed no evidence of PE but did show bilateral pleural effusion.    8/20/2024  Patient seen examined on PCU.  Patient states he feels a lot better today.  Patient denies any current chest pain, abdominal pain, nausea or vomiting or unusual shortness of breath.  BUN/creatinine was 29/4.3.  Blood sugars range .  Hemoglobin A1c 7.4.  WBC 5.7 with hemoglobin 11.7.  Temperature 98.3 with heart rate 84 blood pressure 136/65.  O2 sat 94% on room air at rest.  CT abdomen pelvis yesterday showed no acute renal obstruction.  Possible constipation with moderate bladder distention.  Bladder scan today.  Pending echocardiogram  08/21/2024    NICM (nonischemic cardiomyopathy) ([facility]) 08/21/2024    Elevated troponin 08/21/2024    Acute renal failure (ARF) ([facility]) 08/19/2024    ANGIE (acute kidney injury) ([facility]) 08/17/2024     Impression:  Acute kidney injury  Acute hypoxic respiratory failure  History of chronic kidney disease stage IV with baseline serum creatinine 3.3 April 2024  Acute on chronic systolic and diastolic congestive heart failure with bilateral pleural effusions   Current tobacco abuse and probably underlying COPD  Non-insulin-dependent diabetes mellitus with hyperglycemia-random blood sugar greater than 250 - hemoglobin A1c 7.4  Orthostatic hypotension  Bacteriuria-urine culture showed mixed alexandrea with gram-positive organisms  History of hyperlipidemia  History of hypertension with hypotension on admission-94/78-resolved  History of depression  History of coronary artery disease  History of peripheral vascular disease    Plan:  Patient admitted to monitored bed  Home medication reviewed  Activity up ad isrrael.  Hemoglobin A1c  Heparin 5000 units subcu every 8  Compazine 5 mg IV push every 6 hours as needed for nausea  Glucoscans 4 times daily with sliding scale insulin  General Diet with salt restriction  Accurate I's and O's  Echocardiogram  DuoNeb aerosols 4 times daily  Pulmicort aerosol twice daily  CT of the abdomen pelvis with oral contrast  Spot urine sodium  Spot urine creatinine  Urine for eosinophils    Bladder scan-84 cc on 8/20    Cardiology, nephrology consulted  Urine culture    Doxycycline 100 mg twice daily p.o. x 5 days    Patient  left AMA 8/24 about 5 AM nurses on the floor looked for the patient and he left the hospital.  They called his cell phone and he says I ran away to home.    Patient came back to ED 8/24 about 7 AM because of shortness of breath  IV fluids were stopped  Lasix 60 mg IV push x 1  Procalcitonin    Rocephin 1 g IV piggyback daily  Doxycycline 100 mg p.o. twice daily  Procalcitonin in a.m.  Lasix

## 2024-08-25 NOTE — PROGRESS NOTES
A sitter was placed due to the patient's increased agitation. While the sitter was in the room the pt removed his IV, telemetry box, and oxygen tubing. When asked why, the pt stated he was immortal and did not need it. After educating the patient on his need of oxygen, he decided to put it back on, but was still being verbally aggressive to the sitter. Dr. Alvarado was contacted and updated on the situation. He ordered 2mg of Haldol.

## 2024-08-25 NOTE — PROGRESS NOTES
0.083% nebulizer solution 2.5 mg  2.5 mg Nebulization Q6H PRN Mauro Du A, DO           XR CHEST PORTABLE   Final Result   1. Right perihilar and right lower lobe airspace disease   2. Moderate right pleural effusion   3. Patchy left lower lobe airspace disease   4. Mild cardiomegaly.               LAB DATA     BMP, Mg, Phos    Lab Results   Component Value Date    CREATININE 5.0 (H) 08/25/2024    CREATININE 4.7 (H) 08/24/2024    CREATININE 4.6 (H) 08/24/2024    CREATININE 4.7 (H) 08/23/2024    CREATININE 4.7 (H) 08/22/2024    CREATININE 4.8 (H) 08/21/2024    CREATININE 4.3 (H) 08/20/2024       CBC:   Recent Labs     08/23/24  0546 08/24/24  0842 08/25/24  0720   WBC 6.0 6.8 7.1   HGB 11.6* 12.1* 10.2*    256 228        Lab Results   Component Value Date    IRON 41 (L) 08/23/2024    TIBC 186 (L) 08/23/2024    FERRITIN 147 08/23/2024       Lab Results   Component Value Date    IPTH 337.1 (H) 08/23/2024       Lab Results   Component Value Date    VITD25 14.1 (L) 08/23/2024       Lab Results   Component Value Date    CALCIUM 8.4 (L) 08/25/2024    CALCIUM 9.1 08/24/2024    CALCIUM 9.0 08/24/2024    CAION 1.24 08/23/2024    PHOS 4.7 (H) 08/25/2024    PHOS 4.3 08/23/2024    PHOS 4.1 08/20/2024    MG 1.8 08/25/2024    MG 1.7 08/23/2024    MG 1.8 08/20/2024         BMP:   Recent Labs     08/23/24  0546 08/24/24  0842 08/24/24  1856 08/25/24  0720    134 132 133   K 4.9 6.2* 5.6* 5.3*    104 101 106   CO2 20* 20* 18* 18*   BUN 43* 46* 49* 52*   CREATININE 4.7* 4.6* 4.7* 5.0*       Serum Osmolality:   No results for input(s): \"OSMOS\" in the last 72 hours.      Urine Chemisrty:      No results for input(s): \"CLUR\", \"NAUR\", \"KUR\", \"LABCREA\", \"UREAUR\", \"OSMOU\" in the last 72 hours.                                       No results for input(s): \"MALBCR\", \"LABPROT\" in the last 72 hours.          Assessment: / Plan:      Acute kidney injury.  Acute kidney injury with underlying history of chronic kidney  of 3.3 mg/dL to 3.6 mg/dL.               Gómez Hammonds MD  Nephrology  Electronically signed by Gmóez Hammonds MD on 8/25/2024 at 2:40 PM      Note: This report was completed utilizing computer voice recognition software. Every effort has been made to ensure accuracy, however; inadvertent computerized transcription errors may be present.

## 2024-08-25 NOTE — PROGRESS NOTES
Patient found trying to unplug virtual . When asked why, the patient stated \"because it was making noise.\" The patient was then educated on why is was being used.

## 2024-08-26 LAB
ALBUMIN SERPL-MCNC: 2.8 G/DL (ref 3.5–5.2)
ALP SERPL-CCNC: 93 U/L (ref 40–129)
ALT SERPL-CCNC: 25 U/L (ref 0–40)
ANION GAP SERPL CALCULATED.3IONS-SCNC: 13 MMOL/L (ref 7–16)
AST SERPL-CCNC: 41 U/L (ref 0–39)
BASOPHILS # BLD: 0.02 K/UL (ref 0–0.2)
BASOPHILS NFR BLD: 0 % (ref 0–2)
BILIRUB SERPL-MCNC: 0.2 MG/DL (ref 0–1.2)
BUN SERPL-MCNC: 56 MG/DL (ref 6–23)
CALCIUM SERPL-MCNC: 8.9 MG/DL (ref 8.6–10.2)
CHLORIDE SERPL-SCNC: 103 MMOL/L (ref 98–107)
CO2 SERPL-SCNC: 20 MMOL/L (ref 22–29)
CREAT SERPL-MCNC: 5.4 MG/DL (ref 0.7–1.2)
EOSINOPHIL # BLD: 0.11 K/UL (ref 0.05–0.5)
EOSINOPHILS RELATIVE PERCENT: 2 % (ref 0–6)
ERYTHROCYTE [DISTWIDTH] IN BLOOD BY AUTOMATED COUNT: 14 % (ref 11.5–15)
GFR, ESTIMATED: 10 ML/MIN/1.73M2
GLUCOSE BLD-MCNC: 118 MG/DL (ref 74–99)
GLUCOSE BLD-MCNC: 125 MG/DL (ref 74–99)
GLUCOSE BLD-MCNC: 130 MG/DL (ref 74–99)
GLUCOSE BLD-MCNC: 206 MG/DL (ref 74–99)
GLUCOSE SERPL-MCNC: 134 MG/DL (ref 74–99)
HCT VFR BLD AUTO: 34.4 % (ref 37–54)
HGB BLD-MCNC: 11 G/DL (ref 12.5–16.5)
IMM GRANULOCYTES # BLD AUTO: 0.04 K/UL (ref 0–0.58)
IMM GRANULOCYTES NFR BLD: 1 % (ref 0–5)
LYMPHOCYTES NFR BLD: 0.9 K/UL (ref 1.5–4)
LYMPHOCYTES RELATIVE PERCENT: 14 % (ref 20–42)
MCH RBC QN AUTO: 30 PG (ref 26–35)
MCHC RBC AUTO-ENTMCNC: 32 G/DL (ref 32–34.5)
MCV RBC AUTO: 93.7 FL (ref 80–99.9)
MONOCYTES NFR BLD: 0.57 K/UL (ref 0.1–0.95)
MONOCYTES NFR BLD: 9 % (ref 2–12)
NEUTROPHILS NFR BLD: 75 % (ref 43–80)
NEUTS SEG NFR BLD: 4.93 K/UL (ref 1.8–7.3)
PLATELET # BLD AUTO: 253 K/UL (ref 130–450)
PMV BLD AUTO: 10.3 FL (ref 7–12)
POTASSIUM SERPL-SCNC: 4.7 MMOL/L (ref 3.5–5)
PROCALCITONIN SERPL-MCNC: 0.21 NG/ML (ref 0–0.08)
PROT SERPL-MCNC: 6 G/DL (ref 6.4–8.3)
RBC # BLD AUTO: 3.67 M/UL (ref 3.8–5.8)
SODIUM SERPL-SCNC: 136 MMOL/L (ref 132–146)
WBC OTHER # BLD: 6.6 K/UL (ref 4.5–11.5)

## 2024-08-26 PROCEDURE — 2700000000 HC OXYGEN THERAPY PER DAY

## 2024-08-26 PROCEDURE — 97161 PT EVAL LOW COMPLEX 20 MIN: CPT

## 2024-08-26 PROCEDURE — 85025 COMPLETE CBC W/AUTO DIFF WBC: CPT

## 2024-08-26 PROCEDURE — 36415 COLL VENOUS BLD VENIPUNCTURE: CPT

## 2024-08-26 PROCEDURE — 6360000002 HC RX W HCPCS: Performed by: INTERNAL MEDICINE

## 2024-08-26 PROCEDURE — 84145 PROCALCITONIN (PCT): CPT

## 2024-08-26 PROCEDURE — 6370000000 HC RX 637 (ALT 250 FOR IP): Performed by: INTERNAL MEDICINE

## 2024-08-26 PROCEDURE — 80053 COMPREHEN METABOLIC PANEL: CPT

## 2024-08-26 PROCEDURE — 94640 AIRWAY INHALATION TREATMENT: CPT

## 2024-08-26 PROCEDURE — 2060000000 HC ICU INTERMEDIATE R&B

## 2024-08-26 PROCEDURE — 2580000003 HC RX 258: Performed by: INTERNAL MEDICINE

## 2024-08-26 PROCEDURE — 82962 GLUCOSE BLOOD TEST: CPT

## 2024-08-26 PROCEDURE — 2580000003 HC RX 258: Performed by: STUDENT IN AN ORGANIZED HEALTH CARE EDUCATION/TRAINING PROGRAM

## 2024-08-26 RX ORDER — SODIUM CHLORIDE, SODIUM LACTATE, POTASSIUM CHLORIDE, CALCIUM CHLORIDE 600; 310; 30; 20 MG/100ML; MG/100ML; MG/100ML; MG/100ML
INJECTION, SOLUTION INTRAVENOUS CONTINUOUS
Status: DISCONTINUED | OUTPATIENT
Start: 2024-08-26 | End: 2024-08-27

## 2024-08-26 RX ADMIN — DOXYCYCLINE HYCLATE 100 MG: 100 CAPSULE ORAL at 09:37

## 2024-08-26 RX ADMIN — SODIUM CHLORIDE, POTASSIUM CHLORIDE, SODIUM LACTATE AND CALCIUM CHLORIDE: 600; 310; 30; 20 INJECTION, SOLUTION INTRAVENOUS at 14:09

## 2024-08-26 RX ADMIN — DOXYCYCLINE HYCLATE 100 MG: 100 CAPSULE ORAL at 21:18

## 2024-08-26 RX ADMIN — ALOGLIPTIN 6.25 MG: 6.25 TABLET, FILM COATED ORAL at 09:37

## 2024-08-26 RX ADMIN — ARFORMOTEROL TARTRATE: 15 SOLUTION RESPIRATORY (INHALATION) at 04:55

## 2024-08-26 RX ADMIN — SODIUM BICARBONATE 650 MG: 650 TABLET ORAL at 14:08

## 2024-08-26 RX ADMIN — SODIUM BICARBONATE 650 MG: 650 TABLET ORAL at 09:37

## 2024-08-26 RX ADMIN — WATER 1000 MG: 1 INJECTION INTRAMUSCULAR; INTRAVENOUS; SUBCUTANEOUS at 09:37

## 2024-08-26 RX ADMIN — EZETIMIBE 10 MG: 10 TABLET ORAL at 09:37

## 2024-08-26 RX ADMIN — SODIUM BICARBONATE 650 MG: 650 TABLET ORAL at 21:18

## 2024-08-26 RX ADMIN — ATORVASTATIN CALCIUM 80 MG: 40 TABLET, FILM COATED ORAL at 21:18

## 2024-08-26 RX ADMIN — ARFORMOTEROL TARTRATE: 15 SOLUTION RESPIRATORY (INHALATION) at 20:51

## 2024-08-26 RX ADMIN — Medication 1 TABLET: at 09:37

## 2024-08-26 RX ADMIN — PANTOPRAZOLE SODIUM 40 MG: 40 TABLET, DELAYED RELEASE ORAL at 09:37

## 2024-08-26 RX ADMIN — HEPARIN SODIUM 5000 UNITS: 5000 INJECTION INTRAVENOUS; SUBCUTANEOUS at 14:08

## 2024-08-26 RX ADMIN — SODIUM CHLORIDE: 9 INJECTION, SOLUTION INTRAVENOUS at 10:08

## 2024-08-26 RX ADMIN — INSULIN LISPRO 2 UNITS: 100 INJECTION, SOLUTION INTRAVENOUS; SUBCUTANEOUS at 17:11

## 2024-08-26 RX ADMIN — ALBUTEROL SULFATE 2.5 MG: 2.5 SOLUTION RESPIRATORY (INHALATION) at 04:56

## 2024-08-26 RX ADMIN — METOPROLOL SUCCINATE 50 MG: 50 TABLET, EXTENDED RELEASE ORAL at 09:37

## 2024-08-26 RX ADMIN — BUPROPION HYDROCHLORIDE 300 MG: 150 TABLET, EXTENDED RELEASE ORAL at 10:04

## 2024-08-26 NOTE — ACP (ADVANCE CARE PLANNING)
Advance Care Planning   Healthcare Decision Maker:    Primary Decision Maker: Dawn David - Brother/Sister - [phone]      Today we documented Decision Maker(s) consistent with Legal Next of Kin hierarchy.

## 2024-08-26 NOTE — PROGRESS NOTES
Physical Therapy Initial Evaluation/Plan of Care    Room #:  0536/0536-01  Patient Name: Deion Knight  YOB: 1950  MRN: [id number]    Date of Service: 8/26/2024     Tentative placement recommendation: Home with Home Health Physical Therapy  Equipment recommendation: To be determined      Evaluating Physical Therapist: Jerome Sears, PT #[id number]      Specific Provider Orders/Date/Referring Provider :   08/24/24 1030    PT eval and treat  Start:  08/24/24 1030,   End:  08/24/24 1030,   ONE TIME,   Standing Count:  1 Occurrences,   R       Mauro Du DO    Admitting Diagnosis:   ANGIE (acute kidney injury) ([facility]) [N17.9]      Surgery: none  Visit Diagnoses         Codes    Acute respiratory failure with hypoxia ([facility])    -  Primary J96.01    COPD exacerbation ([facility])     J44.1            Patient Active Problem List   Diagnosis    ANGIE (acute kidney injury) ([facility])    Acute renal failure (ARF) ([facility])    Acute decompensated heart failure ([facility])    Shortness of breath    NICM (nonischemic cardiomyopathy) ([facility])    Elevated troponin        ASSESSMENT of Current Deficits Patient exhibits decreased strength, balance, and endurance impairing functional mobility, transfers, gait distance, and tolerance to activity. Pt ambulated with no device,  mildly unsteady, no loss of balance, 3/10 shortness of breath on 2L O2, spo2 reading 86% after gait. The patient will benefit from continued skilled therapy to increase strength and improve balance for safe functional mobility, to decrease risk of falls, and to meet goals at discharge.        PHYSICAL THERAPY  PLAN OF CARE       Physical therapy plan of care is established based on physician order,  patient diagnosis and clinical assessment    Current Treatment Recommendations:    -Bed Mobility: Lower and upper extremity exercises, and trunk control activities  -Sitting Balance: Incorporate reaching activities to activate trunk muscles , Hands on support to maintain midline , and  2 liters nasal cannula   Blood Pressure at rest  Blood Pressure during session    Heart Rate at rest 71 Heart Rate during session    SPO2 at rest 91% right ring finger SPO2 during session 86-88% difficulty getting reading due to cold fingers      Patient education  Patient educated on role of Physical Therapy, risks of immobility, safety and plan of care,  importance of mobility while in hospital , ankle pumps, quad set and glut set for edema control, blood clot prevention, and safety      Patient response to education:   Pt verbalized understanding Pt demonstrated skill Pt requires further education in this area   Yes Partial Yes      Treatment:  Patient practiced and was instructed/facilitated in the following treatment: Patient assisted to EOB. Sat edge of bed 10 minutes with Supervision  to increase dynamic sitting balance and activity tolerance. Pt stood, ambulated in hallway and back to EOB.     Therapeutic Exercises:  not performed      At end of session, patient sitting edge of bed with alarm call light and phone within reach,  all lines and tubes intact, nursing notified.      Patient would benefit from continued skilled Physical Therapy to improve functional independence and quality of life.         Patient's/ family goals   home    Time in  0956  Time out  0914    Total Treatment Time  0 minutes    Evaluation time includes thorough review of current medical information, gathering information on past medical history/social history and prior level of function, completion of standardized testing/informal observation of tasks, assessment of data, and development of Plan of care and goals.     CPT codes:  Low Complexity PT evaluation (87130)  No treatment    Jerome Sears, PT

## 2024-08-26 NOTE — PLAN OF CARE
Problem: Discharge Planning  Goal: Discharge to home or other facility with appropriate resources  Outcome: Progressing  Flowsheets (Taken 8/25/2024 2000)  Discharge to home or other facility with appropriate resources: Identify barriers to discharge with patient and caregiver     Problem: Pain  Goal: Verbalizes/displays adequate comfort level or baseline comfort level  Outcome: Progressing  Flowsheets (Taken 8/25/2024 2038)  Verbalizes/displays adequate comfort level or baseline comfort level:   Encourage patient to monitor pain and request assistance   Assess pain using appropriate pain scale     Problem: Safety - Adult  Goal: Free from fall injury  Outcome: Progressing     Problem: Risk for Elopement  Goal: Patient will not exit the unit/facility without proper excort  Outcome: Progressing  Flowsheets (Taken 8/25/2024 2000)  Nursing Interventions for Elopement Risk: Assist with personal care needs such as toileting, eating, dressing, as needed to reduce the risk of wandering

## 2024-08-26 NOTE — CARE COORDINATION
Case Management Assessment  Initial Evaluation    Date/Time of Evaluation: 8/26/2024 3:42 PM  Assessment Completed by: JOHNSON Bautista    If patient is discharged prior to next notation, then this note serves as note for discharge by case management.    Patient Name: Deion Knight                   YOB: 1950  Diagnosis: ANGIE (acute kidney injury) ([facility]) [N17.9]                   Date / Time: 8/24/2024  7:30 AM    Patient Admission Status: Inpatient   Readmission Risk (Low < 19, Mod (19-27), High > 27): Readmission Risk Score: 22.7    Current PCP: Dean Alanis APRN - CNP  PCP verified by CM? Yes    Chart Reviewed: Yes      History Provided by: Patient  Patient Orientation: Alert and Oriented    Patient Cognition: Alert    Hospitalization in the last 30 days (Readmission):  Yes    Readmission Assessment  Number of Days since last admission?: 1-7 days  Previous Disposition: Home Alone  Who is being Interviewed: Patient  What was the patient's/caregiver's perception as to why they think they needed to return back to the hospital?: Other (Comment) (Signed out AMA and returned d/t SOB)  Did you visit your Primary Care Physician after you left the hospital, before you returned this time?: No  Why weren't you able to visit your PCP?: Did not have an appointment  Did you see a specialist, such as Cardiac, Pulmonary, Orthopedic Physician, etc. after you left the hospital?: No  Who advised the patient to return to the hospital?: Self-referral  Does the patient report anything that got in the way of taking their medications?: No  In our efforts to provide the best possible care to you and others like you, can you think of anything that we could have done to help you after you left the hospital the first time, so that you might not have needed to return so soon?: Other (Comment) (Pt signed out AMA)      Advance Directives:      Code Status: Prior   Patient's Primary Decision Maker is: Legal Next of Kin

## 2024-08-26 NOTE — PROGRESS NOTES
Nephrology Progress Note  Jass Velazquez MD    Summary Statement:  We are following this patient for ANGIE on CKD Stage 4. [facility]    Interval History:    8/26: Patient seen and examined at bedside. No family members present. A sitter is at bedside which had been assigned for some previous episodes of confusion. Patient is resting, not in any acute distress. He does seem to exhibit some mild confusion though does not appear grossly uremic.         Vital SignsBP 92/66   Pulse 76   Temp 97.8 °F (36.6 °C) (Axillary)   Resp 18   Ht 1.727 m (5' 7.99\")   Wt 63.5 kg (140 lb)   SpO2 98%   BMI 21.29 kg/m²   24HR INTAKE/OUTPUT:    Intake/Output Summary (Last 24 hours) at 8/26/2024 1553  Last data filed at 8/26/2024 0506  Gross per 24 hour   Intake 555.72 ml   Output 700 ml   Net -144.28 ml           PHYSICAL EXAM        Neck: No JVD  Lungs: Breath sounds decreased at the bases.  Scant basilar rales present.  Few rhonchi.  Heart: Regular rate and rhythm. No S3 gallop. No  murmrur.  Abdomen: Soft non distended, non tender and normal bowel sounds.  Extremeties:   No edema.  Neuro: slight tremor in hands (seems to be baseline)          Current Facility-Administered Medications   Medication Dose Route Frequency Provider Last Rate Last Admin    lactated ringers IV soln infusion   IntraVENous Continuous Jass Velazquez MD 50 mL/hr at 08/26/24 1409 New Bag at 08/26/24 1409    cefTRIAXone (ROCEPHIN) 1,000 mg in sterile water 10 mL IV syringe  1,000 mg IntraVENous Q24H Mauro Du DO   1,000 mg at 08/26/24 0937    doxycycline hyclate (VIBRAMYCIN) capsule 100 mg  100 mg Oral 2 times per day Mauro Du DO   100 mg at 08/26/24 0937    haloperidol lactate (HALDOL) injection 2 mg  2 mg IntraMUSCular Q4H PRN Mauro Du DO        nicotine (NICODERM CQ) 21 MG/24HR 1 patch  1 patch TransDERmal Daily Mauro Du DO   1 patch at 08/26/24 0939    sodium bicarbonate tablet 650 mg  650 mg  titrate the dose to a target hemoglobin of 10.0 g/dL. Hb at goal 11.0 g/dL today.    Hypocalcemia.  This may reflect vitamin D deficiency as well as hypoalbuminemia.  Patient on vitamin D supplement.    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  Patient with a PTH level of 337.1 pg/ml and vitamin D level of 14.1 ng/ml.  Will start the patient on vitamin D supplement.    Chronic kidney disease stage G4 of unknown etiology but likely secondary to hypertensive kidney disease  with a baseline serum creatinine of 3.3 mg/dL to 3.6 mg/dL.        Jass Velazquez MD  Nephrology  Electronically signed by Jass Velazquez MD on 8/26/2024 at 3:53 PM      Note: This report was completed utilizing computer voice recognition software. Every effort has been made to ensure accuracy, however; inadvertent computerized transcription errors may be present.

## 2024-08-27 ENCOUNTER — APPOINTMENT (OUTPATIENT)
Dept: GENERAL RADIOLOGY | Age: 74
DRG: 673 | End: 2024-08-27
Payer: OTHER GOVERNMENT

## 2024-08-27 LAB
ALBUMIN SERPL-MCNC: 2.6 G/DL (ref 3.5–5.2)
ALP SERPL-CCNC: 99 U/L (ref 40–129)
ALT SERPL-CCNC: 27 U/L (ref 0–40)
ANION GAP SERPL CALCULATED.3IONS-SCNC: 11 MMOL/L (ref 7–16)
AST SERPL-CCNC: 41 U/L (ref 0–39)
BASOPHILS # BLD: 0.02 K/UL (ref 0–0.2)
BASOPHILS NFR BLD: 0 % (ref 0–2)
BILIRUB SERPL-MCNC: 0.2 MG/DL (ref 0–1.2)
BUN SERPL-MCNC: 53 MG/DL (ref 6–23)
CALCIUM SERPL-MCNC: 8.6 MG/DL (ref 8.6–10.2)
CHLORIDE SERPL-SCNC: 105 MMOL/L (ref 98–107)
CO2 SERPL-SCNC: 21 MMOL/L (ref 22–29)
CREAT SERPL-MCNC: 5.5 MG/DL (ref 0.7–1.2)
EOSINOPHIL # BLD: 0.09 K/UL (ref 0.05–0.5)
EOSINOPHILS RELATIVE PERCENT: 1 % (ref 0–6)
ERYTHROCYTE [DISTWIDTH] IN BLOOD BY AUTOMATED COUNT: 14 % (ref 11.5–15)
GFR, ESTIMATED: 10 ML/MIN/1.73M2
GLUCOSE BLD-MCNC: 155 MG/DL (ref 74–99)
GLUCOSE BLD-MCNC: 169 MG/DL (ref 74–99)
GLUCOSE BLD-MCNC: 173 MG/DL (ref 74–99)
GLUCOSE BLD-MCNC: 85 MG/DL (ref 74–99)
GLUCOSE SERPL-MCNC: 121 MG/DL (ref 74–99)
HCT VFR BLD AUTO: 37.7 % (ref 37–54)
HGB BLD-MCNC: 11.5 G/DL (ref 12.5–16.5)
IMM GRANULOCYTES # BLD AUTO: <0.03 K/UL (ref 0–0.58)
IMM GRANULOCYTES NFR BLD: 0 % (ref 0–5)
LYMPHOCYTES NFR BLD: 0.63 K/UL (ref 1.5–4)
LYMPHOCYTES RELATIVE PERCENT: 10 % (ref 20–42)
MCH RBC QN AUTO: 29 PG (ref 26–35)
MCHC RBC AUTO-ENTMCNC: 30.5 G/DL (ref 32–34.5)
MCV RBC AUTO: 95.2 FL (ref 80–99.9)
MICROORGANISM SPEC CULT: NORMAL
MONOCYTES NFR BLD: 0.56 K/UL (ref 0.1–0.95)
MONOCYTES NFR BLD: 9 % (ref 2–12)
NEUTROPHILS NFR BLD: 80 % (ref 43–80)
NEUTS SEG NFR BLD: 5.11 K/UL (ref 1.8–7.3)
PLATELET # BLD AUTO: 276 K/UL (ref 130–450)
PMV BLD AUTO: 10.4 FL (ref 7–12)
POTASSIUM SERPL-SCNC: 4.3 MMOL/L (ref 3.5–5)
PROCALCITONIN SERPL-MCNC: 0.17 NG/ML (ref 0–0.08)
PROT SERPL-MCNC: 5.8 G/DL (ref 6.4–8.3)
RBC # BLD AUTO: 3.96 M/UL (ref 3.8–5.8)
SODIUM SERPL-SCNC: 137 MMOL/L (ref 132–146)
SPECIMEN DESCRIPTION: NORMAL
WBC OTHER # BLD: 6.4 K/UL (ref 4.5–11.5)

## 2024-08-27 PROCEDURE — 6360000002 HC RX W HCPCS: Performed by: INTERNAL MEDICINE

## 2024-08-27 PROCEDURE — 2580000003 HC RX 258: Performed by: STUDENT IN AN ORGANIZED HEALTH CARE EDUCATION/TRAINING PROGRAM

## 2024-08-27 PROCEDURE — 94640 AIRWAY INHALATION TREATMENT: CPT

## 2024-08-27 PROCEDURE — 2580000003 HC RX 258: Performed by: INTERNAL MEDICINE

## 2024-08-27 PROCEDURE — 80074 ACUTE HEPATITIS PANEL: CPT

## 2024-08-27 PROCEDURE — 97530 THERAPEUTIC ACTIVITIES: CPT

## 2024-08-27 PROCEDURE — 2700000000 HC OXYGEN THERAPY PER DAY

## 2024-08-27 PROCEDURE — 6370000000 HC RX 637 (ALT 250 FOR IP): Performed by: INTERNAL MEDICINE

## 2024-08-27 PROCEDURE — 85025 COMPLETE CBC W/AUTO DIFF WBC: CPT

## 2024-08-27 PROCEDURE — 82962 GLUCOSE BLOOD TEST: CPT

## 2024-08-27 PROCEDURE — 84145 PROCALCITONIN (PCT): CPT

## 2024-08-27 PROCEDURE — 86317 IMMUNOASSAY INFECTIOUS AGENT: CPT

## 2024-08-27 PROCEDURE — 80053 COMPREHEN METABOLIC PANEL: CPT

## 2024-08-27 PROCEDURE — 97165 OT EVAL LOW COMPLEX 30 MIN: CPT

## 2024-08-27 PROCEDURE — 36415 COLL VENOUS BLD VENIPUNCTURE: CPT

## 2024-08-27 PROCEDURE — 71046 X-RAY EXAM CHEST 2 VIEWS: CPT

## 2024-08-27 PROCEDURE — 2060000000 HC ICU INTERMEDIATE R&B

## 2024-08-27 RX ADMIN — METOPROLOL SUCCINATE 50 MG: 50 TABLET, EXTENDED RELEASE ORAL at 09:38

## 2024-08-27 RX ADMIN — Medication 1 TABLET: at 09:37

## 2024-08-27 RX ADMIN — PANTOPRAZOLE SODIUM 40 MG: 40 TABLET, DELAYED RELEASE ORAL at 09:37

## 2024-08-27 RX ADMIN — ARFORMOTEROL TARTRATE: 15 SOLUTION RESPIRATORY (INHALATION) at 05:39

## 2024-08-27 RX ADMIN — ALBUTEROL SULFATE 2.5 MG: 2.5 SOLUTION RESPIRATORY (INHALATION) at 05:39

## 2024-08-27 RX ADMIN — ARFORMOTEROL TARTRATE: 15 SOLUTION RESPIRATORY (INHALATION) at 16:19

## 2024-08-27 RX ADMIN — BUPROPION HYDROCHLORIDE 300 MG: 150 TABLET, EXTENDED RELEASE ORAL at 09:37

## 2024-08-27 RX ADMIN — DOXYCYCLINE HYCLATE 100 MG: 100 CAPSULE ORAL at 21:04

## 2024-08-27 RX ADMIN — SODIUM BICARBONATE 650 MG: 650 TABLET ORAL at 09:36

## 2024-08-27 RX ADMIN — SODIUM CHLORIDE, POTASSIUM CHLORIDE, SODIUM LACTATE AND CALCIUM CHLORIDE: 600; 310; 30; 20 INJECTION, SOLUTION INTRAVENOUS at 10:06

## 2024-08-27 RX ADMIN — ALOGLIPTIN 6.25 MG: 6.25 TABLET, FILM COATED ORAL at 09:38

## 2024-08-27 RX ADMIN — ALBUTEROL SULFATE 2.5 MG: 2.5 SOLUTION RESPIRATORY (INHALATION) at 16:19

## 2024-08-27 RX ADMIN — ATORVASTATIN CALCIUM 80 MG: 40 TABLET, FILM COATED ORAL at 21:04

## 2024-08-27 RX ADMIN — DOXYCYCLINE HYCLATE 100 MG: 100 CAPSULE ORAL at 09:37

## 2024-08-27 RX ADMIN — WATER 1000 MG: 1 INJECTION INTRAMUSCULAR; INTRAVENOUS; SUBCUTANEOUS at 10:42

## 2024-08-27 RX ADMIN — EZETIMIBE 10 MG: 10 TABLET ORAL at 09:38

## 2024-08-27 RX ADMIN — HEPARIN SODIUM 5000 UNITS: 5000 INJECTION INTRAVENOUS; SUBCUTANEOUS at 15:11

## 2024-08-27 RX ADMIN — SODIUM BICARBONATE 650 MG: 650 TABLET ORAL at 21:04

## 2024-08-27 RX ADMIN — SODIUM BICARBONATE 650 MG: 650 TABLET ORAL at 15:11

## 2024-08-27 NOTE — PROGRESS NOTES
OCCUPATIONAL THERAPY INITIAL EVALUATION    [facility]  [address] [place]        Date:2024                                                  Patient Name: Deion Knight    MRN: [id number]    : 1950    Room: [address] [place] [address]      Evaluating OT: Juanpablo Siddiqi OTR/L; #[id number]     Referring Provider and Specific Provider Orders/Date:      24 103  OT eval and treat  Start:  24,   End:  24 1030,   ONE TIME,   Standing Count:  1 Occurrences,   R         Mauro Du A, DO      Placement Recommendation: Home with occupational therapy       Diagnosis:   1. Acute respiratory failure with hypoxia ([facility])    2. COPD exacerbation ([facility])         Surgery: None      Pertinent Medical History:     History reviewed. No pertinent past medical history.    History reviewed. No pertinent surgical history.     Precautions:  Activity as tolerated, falls     Assessment of current deficits:     [x] Functional mobility  [x]ADLs  [x] Strength               []Cognition    [x] Functional transfers   [x] IADLs         [] Safety Awareness   [x]Endurance    [] Fine Coordination              [x] Balance      [] Vision/perception   []Sensation     []Gross Motor Coordination  [] ROM  [] Delirium                   [] Motor Control     OT PLAN OF CARE   OT POC based on physician orders, patient diagnosis and results of clinical assessment    Frequency/Duration 1-3 days/wk for 2 weeks PRN     Specific OT Treatment Interventions to include:   * Instruction/training on adapted ADL techniques and AE recommendations to increase functional independence within precautions       * Training on energy conservation strategies, correct breathing pattern and techniques to improve independence/tolerance for self-care routine  * Functional transfer/mobility training/DME recommendations for increased independence, safety, and fall prevention  * Therapeutic exercise to improve motor  Modified Tyrrell   Sit to supine: Modified Tyrrell   Rolling:Modified Tyrrell     Functional Transfers Supervision from EOB and bedside chair sit to stand.   Transfer training with verbal cues for hand placement throughout session to improve safety.   Modified Tyrrell    Functional Mobility Minimal Assist with no AD; Functional mobility home level distance; SpO2 with activity: 96% on room air.  Modified Tyrrell    Balance Sitting:     Static: good     Dynamic: good   Standing: fair  with no AD  Sitting:     Static: good     Dynamic: good   Standing: good  with cane   Activity Tolerance fair   good    Visual/  Perceptual Glasses: No                Hand Dominance: R     AROM (PROM) Strength Additional Info:  Goal:   RUE  WFL 4/5 good  and wfl FMC/dexterity noted during ADL tasks   Improve overall RUE strength  for participation in functional tasks   LUE WFL 4/5 good  and wfl FMC/dexterity noted during ADL tasks   Improve overall LUE strength  for participation in functional tasks      Vitals:  HR at rest: 76 bpm HR with activity: 91 bpm    SpO2 at rest: 92% SpO2 with activity: 96%      Hearing: WFL   Sensation:  No c/o numbness or tingling  Tone: WFL   Edema: None    Comments: Nursing approved therapy session. Upon arrival the patient was seated at EOB.  At end of session, patient was seated at EOB with call light and phone within reach, all lines and tubes intact.  Overall patient demonstrated decreased independence and safety during completion of ADL/functional transfer/mobility tasks.  Pt would benefit from continued skilled OT to increase safety and independence with completion of ADL/IADL tasks for functional independence and quality of life.    Treatment: OT treatment provided this date includes:   Instruction/training on safety and adapted techniques for completion of ADLs   Instruction/training on safe functional mobility/transfer techniques   Instruction/training on energy

## 2024-08-27 NOTE — PLAN OF CARE
Problem: Discharge Planning  Goal: Discharge to home or other facility with appropriate resources  Outcome: Progressing  Flowsheets (Taken 8/26/2024 2057)  Discharge to home or other facility with appropriate resources: Identify barriers to discharge with patient and caregiver     Problem: Pain  Goal: Verbalizes/displays adequate comfort level or baseline comfort level  Outcome: Progressing  Flowsheets (Taken 8/26/2024 2057)  Verbalizes/displays adequate comfort level or baseline comfort level: Encourage patient to monitor pain and request assistance     Problem: Safety - Adult  Goal: Free from fall injury  Outcome: Progressing     Problem: Risk for Elopement  Goal: Patient will not exit the unit/facility without proper excort  Outcome: Progressing  Flowsheets (Taken 8/26/2024 2057)  Nursing Interventions for Elopement Risk: Assist with personal care needs such as toileting, eating, dressing, as needed to reduce the risk of wandering

## 2024-08-27 NOTE — PROGRESS NOTES
Pulse ox was 100% on room air at rest. Ambulated patient on room air. Oxygen saturation was 96% on room air while ambulating.

## 2024-08-27 NOTE — PROGRESS NOTES
Nephrology Progress Note  Jass Velazquez MD    Summary Statement:  We are following this patient for ANGIE on CKD Stage 4. [facility]    Interval History:    8/26: Patient seen and examined at bedside. No family members present. A sitter is at bedside which had been assigned for some previous episodes of confusion. Patient is resting, not in any acute distress. He does seem to exhibit some mild confusion though does not appear grossly uremic.     8/27:   Seen and examined  No acute events overnight  Making modest amount of urine  Denies shortness of breath  Continues to have relatively meager appetite and energy levels  However, denies overtly uremic symptoms        Vital SignsBP 114/83   Pulse 77   Temp 97.6 °F (36.4 °C) (Axillary)   Resp 19   Ht 1.727 m (5' 7.99\")   Wt 63.5 kg (140 lb)   SpO2 100%   BMI 21.29 kg/m²   24HR INTAKE/OUTPUT:    Intake/Output Summary (Last 24 hours) at 8/27/2024 1638  Last data filed at 8/27/2024 0600  Gross per 24 hour   Intake 897.71 ml   Output 500 ml   Net 397.71 ml           PHYSICAL EXAM        Neck: No JVD  Lungs: Slightly diminished at the bases  Heart: Regular rate and rhythm. No S3 gallop. No  murmrur.  Abdomen: Soft non distended, non tender and normal bowel sounds.  Extremeties:   No edema.  Neuro: slight tremor in hands (seems to be baseline)          Current Facility-Administered Medications   Medication Dose Route Frequency Provider Last Rate Last Admin    cefTRIAXone (ROCEPHIN) 1,000 mg in sterile water 10 mL IV syringe  1,000 mg IntraVENous Q24H Mauro Du DO   1,000 mg at 08/27/24 1042    doxycycline hyclate (VIBRAMYCIN) capsule 100 mg  100 mg Oral 2 times per day Mauro Du A, DO   100 mg at 08/27/24 0937    haloperidol lactate (HALDOL) injection 2 mg  2 mg IntraMUSCular Q4H PRN Mauro Du DO        nicotine (NICODERM CQ) 21 MG/24HR 1 patch  1 patch TransDERmal Daily Mauro Du DO   1 patch at 08/27/24 0937

## 2024-08-27 NOTE — PLAN OF CARE
Problem: Discharge Planning  Goal: Discharge to home or other facility with appropriate resources  8/27/2024 1724 by Izabel Mejia RN  Outcome: Progressing  8/27/2024 0346 by Melanie Lane RN  Outcome: Progressing  Flowsheets (Taken 8/26/2024 2057)  Discharge to home or other facility with appropriate resources: Identify barriers to discharge with patient and caregiver     Problem: Pain  Goal: Verbalizes/displays adequate comfort level or baseline comfort level  8/27/2024 1724 by Izabel Mejia RN  Outcome: Progressing  8/27/2024 0346 by Melanie Lane RN  Outcome: Progressing  Flowsheets (Taken 8/26/2024 2057)  Verbalizes/displays adequate comfort level or baseline comfort level: Encourage patient to monitor pain and request assistance     Problem: Safety - Adult  Goal: Free from fall injury  8/27/2024 1724 by Izabel Mejia RN  Outcome: Progressing  8/27/2024 0346 by Melanie Lane RN  Outcome: Progressing     Problem: Risk for Elopement  Goal: Patient will not exit the unit/facility without proper excort  8/27/2024 1724 by Izabel Mejia RN  Outcome: Progressing  8/27/2024 0346 by Melanie Lane RN  Outcome: Progressing  Flowsheets (Taken 8/26/2024 2057)  Nursing Interventions for Elopement Risk: Assist with personal care needs such as toileting, eating, dressing, as needed to reduce the risk of wandering

## 2024-08-28 LAB
ALBUMIN SERPL-MCNC: 2.5 G/DL (ref 3.5–5.2)
ALP SERPL-CCNC: 102 U/L (ref 40–129)
ALT SERPL-CCNC: 30 U/L (ref 0–40)
ANION GAP SERPL CALCULATED.3IONS-SCNC: 13 MMOL/L (ref 7–16)
AST SERPL-CCNC: 42 U/L (ref 0–39)
BASOPHILS # BLD: 0.05 K/UL (ref 0–0.2)
BASOPHILS NFR BLD: 1 % (ref 0–2)
BILIRUB SERPL-MCNC: 0.3 MG/DL (ref 0–1.2)
BUN SERPL-MCNC: 50 MG/DL (ref 6–23)
CALCIUM SERPL-MCNC: 8.6 MG/DL (ref 8.6–10.2)
CHLORIDE SERPL-SCNC: 104 MMOL/L (ref 98–107)
CO2 SERPL-SCNC: 18 MMOL/L (ref 22–29)
CREAT SERPL-MCNC: 5.3 MG/DL (ref 0.7–1.2)
EOSINOPHIL # BLD: 0.16 K/UL (ref 0.05–0.5)
EOSINOPHILS RELATIVE PERCENT: 3 % (ref 0–6)
ERYTHROCYTE [DISTWIDTH] IN BLOOD BY AUTOMATED COUNT: 14 % (ref 11.5–15)
GFR, ESTIMATED: 11 ML/MIN/1.73M2
GLUCOSE BLD-MCNC: 171 MG/DL (ref 74–99)
GLUCOSE BLD-MCNC: 199 MG/DL (ref 74–99)
GLUCOSE BLD-MCNC: 230 MG/DL (ref 74–99)
GLUCOSE BLD-MCNC: 99 MG/DL (ref 74–99)
GLUCOSE SERPL-MCNC: 139 MG/DL (ref 74–99)
HCT VFR BLD AUTO: 38 % (ref 37–54)
HGB BLD-MCNC: 11.9 G/DL (ref 12.5–16.5)
INR PPP: 1.2
LYMPHOCYTES NFR BLD: 0.21 K/UL (ref 1.5–4)
LYMPHOCYTES RELATIVE PERCENT: 4 % (ref 20–42)
MCH RBC QN AUTO: 29.4 PG (ref 26–35)
MCHC RBC AUTO-ENTMCNC: 31.3 G/DL (ref 32–34.5)
MCV RBC AUTO: 93.8 FL (ref 80–99.9)
MONOCYTES NFR BLD: 0.26 K/UL (ref 0.1–0.95)
MONOCYTES NFR BLD: 5 % (ref 2–12)
NEUTROPHILS NFR BLD: 88 % (ref 43–80)
NEUTS SEG NFR BLD: 5.11 K/UL (ref 1.8–7.3)
PLATELET # BLD AUTO: 258 K/UL (ref 130–450)
PMV BLD AUTO: 10.4 FL (ref 7–12)
POTASSIUM SERPL-SCNC: 4.7 MMOL/L (ref 3.5–5)
PROT SERPL-MCNC: 5.8 G/DL (ref 6.4–8.3)
PROTHROMBIN TIME: 12.8 SEC (ref 9.3–12.4)
RBC # BLD AUTO: 4.05 M/UL (ref 3.8–5.8)
RBC # BLD: ABNORMAL 10*6/UL
SODIUM SERPL-SCNC: 135 MMOL/L (ref 132–146)
WBC OTHER # BLD: 5.8 K/UL (ref 4.5–11.5)

## 2024-08-28 PROCEDURE — 2060000000 HC ICU INTERMEDIATE R&B

## 2024-08-28 PROCEDURE — 2580000003 HC RX 258: Performed by: INTERNAL MEDICINE

## 2024-08-28 PROCEDURE — 82962 GLUCOSE BLOOD TEST: CPT

## 2024-08-28 PROCEDURE — 2700000000 HC OXYGEN THERAPY PER DAY

## 2024-08-28 PROCEDURE — 97530 THERAPEUTIC ACTIVITIES: CPT

## 2024-08-28 PROCEDURE — 36415 COLL VENOUS BLD VENIPUNCTURE: CPT

## 2024-08-28 PROCEDURE — 6360000002 HC RX W HCPCS: Performed by: INTERNAL MEDICINE

## 2024-08-28 PROCEDURE — 85025 COMPLETE CBC W/AUTO DIFF WBC: CPT

## 2024-08-28 PROCEDURE — 80053 COMPREHEN METABOLIC PANEL: CPT

## 2024-08-28 PROCEDURE — 94640 AIRWAY INHALATION TREATMENT: CPT

## 2024-08-28 PROCEDURE — 6370000000 HC RX 637 (ALT 250 FOR IP): Performed by: INTERNAL MEDICINE

## 2024-08-28 PROCEDURE — 85610 PROTHROMBIN TIME: CPT

## 2024-08-28 RX ADMIN — ATORVASTATIN CALCIUM 80 MG: 40 TABLET, FILM COATED ORAL at 20:50

## 2024-08-28 RX ADMIN — WATER 1000 MG: 1 INJECTION INTRAMUSCULAR; INTRAVENOUS; SUBCUTANEOUS at 11:13

## 2024-08-28 RX ADMIN — DOXYCYCLINE HYCLATE 100 MG: 100 CAPSULE ORAL at 20:50

## 2024-08-28 RX ADMIN — SODIUM BICARBONATE 650 MG: 650 TABLET ORAL at 15:14

## 2024-08-28 RX ADMIN — DOXYCYCLINE HYCLATE 100 MG: 100 CAPSULE ORAL at 08:55

## 2024-08-28 RX ADMIN — SODIUM BICARBONATE 650 MG: 650 TABLET ORAL at 20:50

## 2024-08-28 RX ADMIN — PANTOPRAZOLE SODIUM 40 MG: 40 TABLET, DELAYED RELEASE ORAL at 08:56

## 2024-08-28 RX ADMIN — METOPROLOL SUCCINATE 50 MG: 50 TABLET, EXTENDED RELEASE ORAL at 08:56

## 2024-08-28 RX ADMIN — SODIUM BICARBONATE 650 MG: 650 TABLET ORAL at 08:55

## 2024-08-28 RX ADMIN — ALOGLIPTIN 6.25 MG: 6.25 TABLET, FILM COATED ORAL at 08:56

## 2024-08-28 RX ADMIN — BUPROPION HYDROCHLORIDE 300 MG: 150 TABLET, EXTENDED RELEASE ORAL at 08:56

## 2024-08-28 RX ADMIN — ARFORMOTEROL TARTRATE: 15 SOLUTION RESPIRATORY (INHALATION) at 18:45

## 2024-08-28 RX ADMIN — ARFORMOTEROL TARTRATE: 15 SOLUTION RESPIRATORY (INHALATION) at 07:00

## 2024-08-28 RX ADMIN — EZETIMIBE 10 MG: 10 TABLET ORAL at 08:55

## 2024-08-28 RX ADMIN — HEPARIN SODIUM 5000 UNITS: 5000 INJECTION INTRAVENOUS; SUBCUTANEOUS at 15:21

## 2024-08-28 RX ADMIN — Medication 1 TABLET: at 08:55

## 2024-08-28 NOTE — PLAN OF CARE
Problem: Discharge Planning  Goal: Discharge to home or other facility with appropriate resources  8/28/2024 0251 by Melanie Lane RN  Outcome: Progressing  8/28/2024 0250 by Melanie Lane RN  Outcome: Progressing  Flowsheets (Taken 8/27/2024 2000)  Discharge to home or other facility with appropriate resources: Identify barriers to discharge with patient and caregiver  8/27/2024 1724 by Izabel Mejia RN  Outcome: Progressing     Problem: Pain  Goal: Verbalizes/displays adequate comfort level or baseline comfort level  8/28/2024 0251 by Melanie Lane RN  Outcome: Progressing  8/28/2024 0250 by Melanie Lane RN  Outcome: Progressing  8/27/2024 1724 by Izabel Mejia RN  Outcome: Progressing     Problem: Safety - Adult  Goal: Free from fall injury  8/28/2024 0251 by Melanie Lane RN  Outcome: Progressing  8/28/2024 0250 by Melanie Lane RN  Outcome: Progressing  8/27/2024 1724 by Izabel Mejia RN  Outcome: Progressing     Problem: Risk for Elopement  Goal: Patient will not exit the unit/facility without proper excort  8/28/2024 0251 by Melanie Lane RN  Outcome: Progressing  8/28/2024 0250 by Melanie Lane RN  Outcome: Progressing  Flowsheets (Taken 8/27/2024 2000)  Nursing Interventions for Elopement Risk: Assist with personal care needs such as toileting, eating, dressing, as needed to reduce the risk of wandering  8/27/2024 1724 by Izabel Mejia RN  Outcome: Progressing

## 2024-08-28 NOTE — CARE COORDINATION
8/28/24 1705 CM note: covid (-) 8/22/24, urine cx (+) 8/22/24. IV rocephin and po vibramycin. Room air. SW consult noted for outpt HD arrangements, he lives by [facility] (preferred unit). CM will send paperwork in am. Order for IR TDC (? tunneled or temporary) placement noted. Discharge plan is home with [facility]. [facility] accepted under pts Medicare. WILL NEED HHC ORDERS. Pt lives alone in a mobile home, [address]. Pt reports being mostly ind with adls pta. Pt has the following DME: spc, sc & std walker. Pts friend Mark Anthony will provide transportation home. CM will follow. Electronically signed by Tatiana Gray RN on 8/28/2024 at 5:16 PM

## 2024-08-28 NOTE — PROGRESS NOTES
Nephrology Progress Note  Jass Velazquez MD    Summary Statement:  We are following this patient for ANGIE on CKD Stage 4. [facility]    Interval History:    8/26: Patient seen and examined at bedside. No family members present. A sitter is at bedside which had been assigned for some previous episodes of confusion. Patient is resting, not in any acute distress. He does seem to exhibit some mild confusion though does not appear grossly uremic.     8/27:   Seen and examined  No acute events overnight  Making modest amount of urine  Denies shortness of breath  Continues to have relatively meager appetite and energy levels  However, denies overtly uremic symptoms    8/28:  Patient seen and examined  Mood different today, he is now much more open about his lack of energy and after extensive back and forth, he has decided he wants to pursue dialysis    Poor appetite and energy levels  No acute events overnight  D/w nursing staff        Vital SignsBP 95/75   Pulse 81   Temp 97.6 °F (36.4 °C) (Oral)   Resp 17   Ht 1.727 m (5' 7.99\")   Wt 63.5 kg (140 lb)   SpO2 99%   BMI 21.29 kg/m²   24HR INTAKE/OUTPUT:    Intake/Output Summary (Last 24 hours) at 8/28/2024 1732  Last data filed at 8/27/2024 2000  Gross per 24 hour   Intake 350.98 ml   Output --   Net 350.98 ml           PHYSICAL EXAM        Neck: No JVD  Lungs: essentially clear to auscultation  Heart: Regular rate and rhythm. No S3 gallop. No  murmrur.  Abdomen: Soft non distended, non tender and normal bowel sounds.  Extremeties:   No edema.  Neuro: AOx3, no observable tremor          Current Facility-Administered Medications   Medication Dose Route Frequency Provider Last Rate Last Admin    cefTRIAXone (ROCEPHIN) 1,000 mg in sterile water 10 mL IV syringe  1,000 mg IntraVENous Q24H DuMauro lópez A, DO   1,000 mg at 08/28/24 1113    doxycycline hyclate (VIBRAMYCIN) capsule 100 mg  100 mg Oral 2 times per day Mauro Du A, DO   100 mg at  lispro (HUMALOG,ADMELOG) injection vial 0-4 Units  0-4 Units SubCUTAneous Nightly Du, Mauro A, DO   4 Units at 08/24/24 2147    acetaminophen (TYLENOL) tablet 500 mg  500 mg Oral Q6H PRN Du, Mauro A, DO        heparin (porcine) injection 5,000 Units  5,000 Units SubCUTAneous 3 times per day Du, Mauro A, DO   5,000 Units at 08/28/24 1521    prochlorperazine (COMPAZINE) injection 5 mg  5 mg IntraVENous Q6H PRN Du, Mauro A, DO        polyethylene glycol (GLYCOLAX) packet 17 g  17 g Oral Daily PRN Du, Mauro A, DO        alogliptin (NESINA) tablet 6.25 mg  6.25 mg Oral Daily Du, Mauro A, DO   6.25 mg at 08/28/24 0856    albuterol (PROVENTIL) (2.5 MG/3ML) 0.083% nebulizer solution 2.5 mg  2.5 mg Nebulization Q6H PRN Du, Mauro A, DO   2.5 mg at 08/27/24 1619       XR CHEST (2 VW)   Final Result   1.  Improvement in the appearance of chest compared to 08/24/2024.      2.  Small to moderate bilateral pleural effusions         XR CHEST PORTABLE   Final Result   1. Right perihilar and right lower lobe airspace disease   2. Moderate right pleural effusion   3. Patchy left lower lobe airspace disease   4. Mild cardiomegaly.         IR Interventional Radiology Procedure Request    (Results Pending)         LAB DATA     BMP, Mg, Phos    Lab Results   Component Value Date    CREATININE 5.3 (H) 08/28/2024    CREATININE 5.5 (H) 08/27/2024    CREATININE 5.4 (H) 08/26/2024    CREATININE 5.2 (H) 08/25/2024    CREATININE 5.0 (H) 08/25/2024    CREATININE 4.7 (H) 08/24/2024    CREATININE 4.6 (H) 08/24/2024       CBC:   Recent Labs     08/26/24  0652 08/27/24  0917 08/28/24  0905   WBC 6.6 6.4 5.8   HGB 11.0* 11.5* 11.9*    276 258        Lab Results   Component Value Date    IRON 41 (L) 08/23/2024    TIBC 186 (L) 08/23/2024    FERRITIN 147 08/23/2024       Lab Results   Component Value Date    IPTH 337.1 (H) 08/23/2024       Lab Results   Component Value Date    VITD25 14.1 (L) 08/23/2024  dialysis.    Hyperkalemia.  Hyperkalemia secondary to chronic kidney disease and consequently limited renal potassium excretion ability.  Patient may have had some high potassium content foods while out of the hospital.  Improved.  Will add Lokelma.  Restrict dietary potassium.  Follow repeat potassium levels.  K stable 4.7 today.    Anemia of  chronic kidney disease.  We will check serum iron studies.  Follow hemoglobin and hematocrit.  Follow serum iron studies.  If needed will use JEREL and titrate the dose to a target hemoglobin of 10.0 g/dL. Hb at goal 11.9 g/dL today.    Hypocalcemia.  This may reflect vitamin D deficiency as well as hypoalbuminemia.  Patient on vitamin D supplement.    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  Patient with a PTH level of 337.1 pg/ml and vitamin D level of 14.1 ng/ml.  Will start the patient on vitamin D supplement.    Chronic kidney disease stage G4 of unknown etiology but likely secondary to hypertensive kidney disease  with a baseline serum creatinine of 3.3 mg/dL to 3.6 mg/dL.      Plans to start HD tomorrow    Jass Velazquez MD  Nephrology  Electronically signed by Jass Velazquez MD on 8/28/2024 at 5:32 PM      Note: This report was completed utilizing computer voice recognition software. Every effort has been made to ensure accuracy, however; inadvertent computerized transcription errors may be present.

## 2024-08-28 NOTE — PLAN OF CARE
Problem: Discharge Planning  Goal: Discharge to home or other facility with appropriate resources  8/28/2024 0250 by Melanie Lane RN  Outcome: Progressing  Flowsheets (Taken 8/27/2024 2000)  Discharge to home or other facility with appropriate resources: Identify barriers to discharge with patient and caregiver  8/27/2024 1724 by Izabel Mejia RN  Outcome: Progressing     Problem: Pain  Goal: Verbalizes/displays adequate comfort level or baseline comfort level  8/28/2024 0250 by Melanie Lane RN  Outcome: Progressing  8/27/2024 1724 by Izabel Mejia RN  Outcome: Progressing     Problem: Safety - Adult  Goal: Free from fall injury  8/28/2024 0250 by Melanie Lane RN  Outcome: Progressing  8/27/2024 1724 by Izabel Mejia RN  Outcome: Progressing     Problem: Risk for Elopement  Goal: Patient will not exit the unit/facility without proper excort  8/28/2024 0250 by Melanie Lane RN  Outcome: Progressing  Flowsheets (Taken 8/27/2024 2000)  Nursing Interventions for Elopement Risk: Assist with personal care needs such as toileting, eating, dressing, as needed to reduce the risk of wandering  8/27/2024 1724 by Izabel Mejia RN  Outcome: Progressing

## 2024-08-28 NOTE — PROGRESS NOTES
is needed moving to and from a bed to a chair?: A Little  How much help is needed standing up from a chair using your arms?: A Little  How much help is needed walking in hospital room?: A Little  How much help is needed climbing 3-5 steps with a railing?: A Little  AM-PAC Inpatient Mobility Raw Score : 18  AM-PAC Inpatient T-Scale Score : 43.63  Mobility Inpatient CMS 0-100% Score: 46.58  Mobility Inpatient CMS G-Code Modifier : CK    Nursing cleared patient for PT treatment.      OBJECTIVE:   Initial Evaluation  Date: 8/26/2024 Treatment Date:  8/28/2024       Short Term/ Long Term   Goals   Was pt agreeable to Eval/treatment? Yes yes To be met in 4 days   Pain level   0/10   0/10    Bed Mobility  Using rails and head of bed elevated:     Rolling: Supervision     Supine to sit: Supervision     Sit to supine: Not assessed patient seated edge of bed    Scooting: Supervision    Using rails and head of bed elevated:     Rolling: Not assessed patient seated edge of bed   Supine to sit: Not assessed patient seated edge of bed   Sit to supine: Not assessed patient seated edge of bed   Scooting: Not assessed patient seated edge of bed    Rolling: Independent    Supine to sit: Independent    Sit to supine: Independent    Scooting: Independent     Transfers Sit to stand: Supervision   Sit to stand: Supervision     Sit to stand: Independent    Ambulation     35 feet using  no device with Minimal assist of 1   for balance, safety, and O2 line management  and Patient with unsteady gait, no loss of balance 60 feet; 40 feet; 70 feet; 30 feet using  no device with Minimal progressing to supervision   cues for safety, pacing, and pursed lip breathing     100 feet using  no device with Independent    Stair negotiation: ascended and descended   Not assessed     4 steps, bilateral rail, Supervision       ROM Within functional limits    Increase range of motion 10% of affected joints    Strength BUE:  refer to OT eval  RLE:  4/5  LLE:   4/5  Increase strength in affected mm groups by 1/3 grade   Balance Sitting EOB:  good -  Dynamic Standing:  fair  no device  Sitting EOB: good   Dynamic Standing: fair using no device   Sitting EOB:  good   Dynamic Standing: good      Patient is Alert & Oriented x person, place, time, and situation and follows directions    Sensation:  Patient  reports numbness/tingling bilateral hands     Edema:  no   Endurance: fair      Vitals: room air   Blood Pressure at rest  Blood Pressure during session    Heart Rate at rest  Heart Rate during session    SPO2 at rest 95% right ring finger SPO2 during session 93 - 94% difficulty getting reading due to cold fingers      Patient education  Patient educated on role of Physical Therapy, risks of immobility, safety and plan of care,  importance of mobility while in hospital , ankle pumps, quad set and glut set for edema control, blood clot prevention, and safety      Patient response to education:   Pt verbalized understanding Pt demonstrated skill Pt requires further education in this area   Yes Partial Yes      Treatment:  Patient practiced and was instructed/facilitated in the following treatment: Patient sitting on the edge of the bed at start of tx session. Sat edge of bed 10 minutes with Supervision  to increase dynamic sitting balance and activity tolerance. Pt educated on the importance of mobility, maintaining strength, endurance, and safety. Pt stood, ambulated in hallway, needing 3 standing rest and 1 seated rest break,  and back to EOB.     Therapeutic Exercises:  not performed      At end of session, patient sitting edge of bed with  .  call light and phone within reach,  all lines and tubes intact, nursing notified.      Patient would benefit from continued skilled Physical Therapy to improve functional independence and quality of life.         Patient's/ family goals   home    Time in  13:12  Time out 13:35    Total Treatment Time  23 minutes        CPT  codes:    Therapeutic activities (74683)   23 minutes  2 unit(s)    Juan Diaz  [facility]  LIC # [id number]

## 2024-08-29 ENCOUNTER — APPOINTMENT (OUTPATIENT)
Dept: INTERVENTIONAL RADIOLOGY/VASCULAR | Age: 74
End: 2024-08-29
Payer: OTHER GOVERNMENT

## 2024-08-29 LAB
ALBUMIN SERPL-MCNC: 2.3 G/DL (ref 3.5–5.2)
ALP SERPL-CCNC: 91 U/L (ref 40–129)
ALT SERPL-CCNC: 25 U/L (ref 0–40)
ANION GAP SERPL CALCULATED.3IONS-SCNC: 11 MMOL/L (ref 7–16)
AST SERPL-CCNC: 35 U/L (ref 0–39)
BASOPHILS # BLD: 0.02 K/UL (ref 0–0.2)
BASOPHILS NFR BLD: 0 % (ref 0–2)
BILIRUB SERPL-MCNC: 0.2 MG/DL (ref 0–1.2)
BUN SERPL-MCNC: 46 MG/DL (ref 6–23)
CALCIUM SERPL-MCNC: 8.4 MG/DL (ref 8.6–10.2)
CHLORIDE SERPL-SCNC: 103 MMOL/L (ref 98–107)
CO2 SERPL-SCNC: 21 MMOL/L (ref 22–29)
CREAT SERPL-MCNC: 5.2 MG/DL (ref 0.7–1.2)
EOSINOPHIL # BLD: 0.13 K/UL (ref 0.05–0.5)
EOSINOPHILS RELATIVE PERCENT: 2 % (ref 0–6)
ERYTHROCYTE [DISTWIDTH] IN BLOOD BY AUTOMATED COUNT: 13.9 % (ref 11.5–15)
GFR, ESTIMATED: 11 ML/MIN/1.73M2
GLUCOSE BLD-MCNC: 104 MG/DL (ref 74–99)
GLUCOSE BLD-MCNC: 123 MG/DL (ref 74–99)
GLUCOSE BLD-MCNC: 166 MG/DL (ref 74–99)
GLUCOSE BLD-MCNC: 99 MG/DL (ref 74–99)
GLUCOSE SERPL-MCNC: 126 MG/DL (ref 74–99)
HAV IGM SERPL QL IA: NONREACTIVE
HBV CORE IGM SERPL QL IA: NONREACTIVE
HBV SURFACE AB SERPL IA-ACNC: <3.1 MIU/ML (ref 0–9.99)
HBV SURFACE AG SERPL QL IA: NONREACTIVE
HCT VFR BLD AUTO: 34.7 % (ref 37–54)
HCV AB SERPL QL IA: NONREACTIVE
HGB BLD-MCNC: 11.1 G/DL (ref 12.5–16.5)
IMM GRANULOCYTES # BLD AUTO: <0.03 K/UL (ref 0–0.58)
IMM GRANULOCYTES NFR BLD: 0 % (ref 0–5)
LYMPHOCYTES NFR BLD: 0.73 K/UL (ref 1.5–4)
LYMPHOCYTES RELATIVE PERCENT: 12 % (ref 20–42)
MCH RBC QN AUTO: 29.8 PG (ref 26–35)
MCHC RBC AUTO-ENTMCNC: 32 G/DL (ref 32–34.5)
MCV RBC AUTO: 93 FL (ref 80–99.9)
MONOCYTES NFR BLD: 0.57 K/UL (ref 0.1–0.95)
MONOCYTES NFR BLD: 9 % (ref 2–12)
NEUTROPHILS NFR BLD: 76 % (ref 43–80)
NEUTS SEG NFR BLD: 4.66 K/UL (ref 1.8–7.3)
PLATELET # BLD AUTO: 278 K/UL (ref 130–450)
PMV BLD AUTO: 10.3 FL (ref 7–12)
POTASSIUM SERPL-SCNC: 4.4 MMOL/L (ref 3.5–5)
PROT SERPL-MCNC: 5.4 G/DL (ref 6.4–8.3)
RBC # BLD AUTO: 3.73 M/UL (ref 3.8–5.8)
SODIUM SERPL-SCNC: 135 MMOL/L (ref 132–146)
WBC OTHER # BLD: 6.1 K/UL (ref 4.5–11.5)

## 2024-08-29 PROCEDURE — 94640 AIRWAY INHALATION TREATMENT: CPT

## 2024-08-29 PROCEDURE — 76937 US GUIDE VASCULAR ACCESS: CPT

## 2024-08-29 PROCEDURE — 90935 HEMODIALYSIS ONE EVALUATION: CPT

## 2024-08-29 PROCEDURE — 7100000010 HC PHASE II RECOVERY - FIRST 15 MIN: Performed by: RADIOLOGY

## 2024-08-29 PROCEDURE — 6370000000 HC RX 637 (ALT 250 FOR IP): Performed by: INTERNAL MEDICINE

## 2024-08-29 PROCEDURE — 2060000000 HC ICU INTERMEDIATE R&B

## 2024-08-29 PROCEDURE — 6360000002 HC RX W HCPCS: Performed by: INTERNAL MEDICINE

## 2024-08-29 PROCEDURE — 82962 GLUCOSE BLOOD TEST: CPT

## 2024-08-29 PROCEDURE — C1894 INTRO/SHEATH, NON-LASER: HCPCS

## 2024-08-29 PROCEDURE — 7100000011 HC PHASE II RECOVERY - ADDTL 15 MIN: Performed by: RADIOLOGY

## 2024-08-29 PROCEDURE — 36558 INSERT TUNNELED CV CATH: CPT

## 2024-08-29 PROCEDURE — 36415 COLL VENOUS BLD VENIPUNCTURE: CPT

## 2024-08-29 PROCEDURE — 77001 FLUOROGUIDE FOR VEIN DEVICE: CPT

## 2024-08-29 PROCEDURE — 6360000002 HC RX W HCPCS: Performed by: RADIOLOGY

## 2024-08-29 PROCEDURE — 2709999900 IR FLUORO GUIDED CVA DEVICE PLMT/REPLACE/REMOVAL

## 2024-08-29 PROCEDURE — 2580000003 HC RX 258: Performed by: INTERNAL MEDICINE

## 2024-08-29 PROCEDURE — 0JH63XZ INSERTION OF TUNNELED VASCULAR ACCESS DEVICE INTO CHEST SUBCUTANEOUS TISSUE AND FASCIA, PERCUTANEOUS APPROACH: ICD-10-PCS | Performed by: STUDENT IN AN ORGANIZED HEALTH CARE EDUCATION/TRAINING PROGRAM

## 2024-08-29 PROCEDURE — 2700000000 HC OXYGEN THERAPY PER DAY

## 2024-08-29 PROCEDURE — 85025 COMPLETE CBC W/AUTO DIFF WBC: CPT

## 2024-08-29 PROCEDURE — 80053 COMPREHEN METABOLIC PANEL: CPT

## 2024-08-29 PROCEDURE — 02HV33Z INSERTION OF INFUSION DEVICE INTO SUPERIOR VENA CAVA, PERCUTANEOUS APPROACH: ICD-10-PCS | Performed by: STUDENT IN AN ORGANIZED HEALTH CARE EDUCATION/TRAINING PROGRAM

## 2024-08-29 PROCEDURE — C1769 GUIDE WIRE: HCPCS

## 2024-08-29 RX ORDER — MIDAZOLAM HYDROCHLORIDE 1 MG/ML
INJECTION INTRAMUSCULAR; INTRAVENOUS PRN
Status: COMPLETED | OUTPATIENT
Start: 2024-08-29 | End: 2024-08-29

## 2024-08-29 RX ORDER — FENTANYL CITRATE 0.05 MG/ML
INJECTION, SOLUTION INTRAMUSCULAR; INTRAVENOUS PRN
Status: COMPLETED | OUTPATIENT
Start: 2024-08-29 | End: 2024-08-29

## 2024-08-29 RX ADMIN — SODIUM BICARBONATE 650 MG: 650 TABLET ORAL at 22:36

## 2024-08-29 RX ADMIN — FENTANYL CITRATE 50 MCG: 50 INJECTION INTRAMUSCULAR; INTRAVENOUS at 12:34

## 2024-08-29 RX ADMIN — DOXYCYCLINE HYCLATE 100 MG: 100 CAPSULE ORAL at 22:36

## 2024-08-29 RX ADMIN — EZETIMIBE 10 MG: 10 TABLET ORAL at 08:08

## 2024-08-29 RX ADMIN — MIDAZOLAM 1 MG: 1 INJECTION INTRAMUSCULAR; INTRAVENOUS at 12:34

## 2024-08-29 RX ADMIN — SODIUM BICARBONATE 650 MG: 650 TABLET ORAL at 08:07

## 2024-08-29 RX ADMIN — Medication 1 TABLET: at 08:07

## 2024-08-29 RX ADMIN — ATORVASTATIN CALCIUM 80 MG: 40 TABLET, FILM COATED ORAL at 22:36

## 2024-08-29 RX ADMIN — ARFORMOTEROL TARTRATE: 15 SOLUTION RESPIRATORY (INHALATION) at 07:08

## 2024-08-29 RX ADMIN — WATER 1000 MG: 1 INJECTION INTRAMUSCULAR; INTRAVENOUS; SUBCUTANEOUS at 09:51

## 2024-08-29 RX ADMIN — ARFORMOTEROL TARTRATE: 15 SOLUTION RESPIRATORY (INHALATION) at 19:03

## 2024-08-29 RX ADMIN — ALBUTEROL SULFATE 2.5 MG: 2.5 SOLUTION RESPIRATORY (INHALATION) at 07:08

## 2024-08-29 RX ADMIN — ALOGLIPTIN 6.25 MG: 6.25 TABLET, FILM COATED ORAL at 08:07

## 2024-08-29 RX ADMIN — PANTOPRAZOLE SODIUM 40 MG: 40 TABLET, DELAYED RELEASE ORAL at 08:07

## 2024-08-29 RX ADMIN — BUPROPION HYDROCHLORIDE 300 MG: 150 TABLET, EXTENDED RELEASE ORAL at 08:06

## 2024-08-29 RX ADMIN — DOXYCYCLINE HYCLATE 100 MG: 100 CAPSULE ORAL at 08:07

## 2024-08-29 RX ADMIN — METOPROLOL SUCCINATE 50 MG: 50 TABLET, EXTENDED RELEASE ORAL at 08:07

## 2024-08-29 ASSESSMENT — PAIN SCALES - GENERAL: PAINLEVEL_OUTOF10: 0

## 2024-08-29 NOTE — CARE COORDINATION
8/29/24 1100 CM note: covid (-) 8/22/24, urine cx (+) 8/22/24. IV rocephin and po vibramycin. PIV x 1. 2L nc; NO home O2. Plan for tunneled dialysis catheter placement today followed by 1st HD treatment. Faxed paperwork to [place], [facility] requesting Pricila Haskins. Discharge plan is home with [facility]. [facility] accepted under pts Medicare. WILL NEED HHC ORDERS. Pt lives alone in a mobile home, 4 L[address]. Pt reports being mostly ind with adls pta. Pt has the following DME: spc, sc & std walker. Pts friend Mark Anthony will provide transportation home. CM will follow. Electronically signed by Tatiana Gray RN on 8/29/2024 at 11:02 AM

## 2024-08-29 NOTE — OR NURSING
Patient is alert and oriented, procedure was explained and questions answered.  Procedure consent signed.  Patient has been NPO since midnight.  Respirations are easy, even and unlabored.  Patient was positioned, prepped and secured to table. Emotional support given.

## 2024-08-29 NOTE — PROGRESS NOTES
Patient came down to special procedures for tunneled dialysis catheter placement.  Patient has been NPO since midnight.    Procedure was explained, questions were answered.  Patient was educated about the amount of radiation used with today's procedure.   Patient prepped secured and draped.  Emotional support given.     1234 sedation medication given    1236 -  Procedure start /82  75  16  100% ETCO2 2 Liter via nasal cannula.  Supine position     1249 -  Procedure end VS  138/65  65  16  93%  on 2 liter via nasal cannula     Tunneled dialysis catheter to right IJ/chest.  Catheter sutured in place with one suture around catheter and two sutures anchoring catheter.  1 non absorbable Silk sutures placed to right IJ.      Folded 4 x 4 and tegaderm  applied to right IJ and  right chest.  CDI    Patient tolerated procedure    See sedation documentation for vital signs    1304-  15 minutes post procedure /62  70  16   98% on 2  2 liters O2 nasal cannula. no complaints of pain, CDI.    1319 -  30 minutes post procedure /62  70  16  99% on 2 liters nasal cannula O2 no complaints of pain, CDI.    Total amount of sedation medication given during procedure: 1 mg of IV Versed and 50 mcg of IV Fentanyl     nurse to nurse called, spoke with KT Hollins, nurse notified of above information.  Nurse assumed post sedation vital signs    Patient transported back to the 5th floor.

## 2024-08-29 NOTE — PROGRESS NOTES
Patient tolerated procedure.  DSD applied to right IJ.  CDI. VSS. Respirations easy, even, unlabored. Report given to mallory MERAZ.  Patient transported to Dialysis.

## 2024-08-29 NOTE — PLAN OF CARE
Problem: Discharge Planning  Goal: Discharge to home or other facility with appropriate resources  8/28/2024 2146 by Laurel Blevins, RN  Outcome: Progressing  8/28/2024 1735 by Izabel Mejia RN  Outcome: Progressing     Problem: Pain  Goal: Verbalizes/displays adequate comfort level or baseline comfort level  Outcome: Progressing     Problem: Safety - Adult  Goal: Free from fall injury  8/28/2024 2146 by Laurel Blevins, RN  Outcome: Progressing  8/28/2024 1735 by Izabel Mejia, RN  Outcome: Progressing     Problem: Risk for Elopement  Goal: Patient will not exit the unit/facility without proper excort  8/28/2024 2146 by Laurel Blevins, RN  Outcome: Progressing  8/28/2024 1735 by Izabel Mejia, RN  Outcome: Progressing

## 2024-08-29 NOTE — PROGRESS NOTES
Department of Internal Medicine        CHIEF COMPLAINT:  + SOB with exertion, intermittent upper abdominal pain    Reason for Admission: Acute renal failure, CHF    HISTORY OF PRESENT ILLNESS:      The patient is a 74 y.o. male who presents with increased shortness of breath has been going on for about a month.  Patient was seen in the ED 2 days ago with the same symptoms of shortness of breath when he lays down and when he ambulates.  Patient said the symptoms going on for about a month.  He denies any chest pain or palpitations or dizziness.  He denies any fever/chills or productive cough.  He denies any history of heart problems.  Patient's BUN/creatinine and was elevated at 30/4.4 with.  No history of kidney disease in the past.  His proBNP was greater than 70,000.  Troponin was 81.  CBC was normal.  Patient random blood sugar though was also 253.  Patient signed out AMA but came back into the hospital because [facility]nurse practitioner and told to come back in.  Patient states symptoms about the same with a WBC 6.3 and hemoglobin 13.0 today.  Pending repeat BMP today.  Temperature was 98 with a heart rate of 110 and blood pressure 94/78.  O2 sat 98% on room air at rest.  Chest x-ray was ordered and pending.  The patient did have a CTA of the lungs that on the 17th which showed no evidence of PE but did show bilateral pleural effusion.    8/20/2024  Patient seen examined on PCU.  Patient states he feels a lot better today.  Patient denies any current chest pain, abdominal pain, nausea or vomiting or unusual shortness of breath.  BUN/creatinine was 29/4.3.  Blood sugars range .  Hemoglobin A1c 7.4.  WBC 5.7 with hemoglobin 11.7.  Temperature 98.3 with heart rate 84 blood pressure 136/65.  O2 sat 94% on room air at rest.  CT abdomen pelvis yesterday showed no acute renal obstruction.  Possible constipation with moderate bladder distention.  Bladder scan today.  Pending echocardiogram  08/29/2024 04:24 AM    K 4.4 08/29/2024 04:24 AM     08/29/2024 04:24 AM    CO2 21 08/29/2024 04:24 AM    BUN 46 08/29/2024 04:24 AM    CREATININE 5.2 08/29/2024 04:24 AM    LABGLOM 11 08/29/2024 04:24 AM    GLUCOSE 126 08/29/2024 04:24 AM    CALCIUM 8.4 08/29/2024 04:24 AM    BILITOT 0.2 08/29/2024 04:24 AM    ALKPHOS 91 08/29/2024 04:24 AM    AST 35 08/29/2024 04:24 AM    ALT 25 08/29/2024 04:24 AM     Magnesium:    Lab Results   Component Value Date/Time    MG 1.8 08/25/2024 07:20 AM     Phosphorus:    Lab Results   Component Value Date/Time    PHOS 4.7 08/25/2024 07:20 AM     PT/INR:    Lab Results   Component Value Date/Time    PROTIME 12.8 08/28/2024 03:11 PM    INR 1.2 08/28/2024 03:11 PM     Troponin:  No results found for: \"TROPONINI\"  U/A:    Lab Results   Component Value Date/Time    COLORU Yellow 08/21/2024 01:12 PM    PROTEINU >=300 08/21/2024 01:12 PM    PHUR 6.0 08/21/2024 01:12 PM    WBCUA 10 TO 20 08/21/2024 01:12 PM    RBCUA 6 TO 9 08/21/2024 01:12 PM    BACTERIA 1+ 08/21/2024 01:12 PM    LEUKOCYTESUR TRACE 08/21/2024 01:12 PM    UROBILINOGEN 0.2 08/21/2024 01:12 PM    BILIRUBINUR NEGATIVE 08/21/2024 01:12 PM    GLUCOSEU 500 08/21/2024 01:12 PM     ABG:  No results found for: \"PH\", \"PCO2\", \"PO2\", \"HCO3\", \"BE\", \"THGB\", \"TCO2\", \"O2SAT\"  HgBA1c:    Lab Results   Component Value Date/Time    LABA1C 7.5 08/24/2024 08:42 AM     FLP:    Lab Results   Component Value Date/Time    TRIG 87 08/20/2024 06:58 AM    HDL 70 08/20/2024 06:58 AM     TSH:    Lab Results   Component Value Date/Time    TSH 3.83 08/20/2024 06:58 AM     IRON:    Lab Results   Component Value Date/Time    IRON 41 08/23/2024 05:46 AM     LIPASE:  No results found for: \"LIPASE\"    ASSESSMENT AND PLAN:      Patient Active Problem List    Diagnosis Date Noted    Acute decompensated heart failure (HCC) 08/21/2024    Shortness of breath 08/21/2024    NICM (nonischemic cardiomyopathy) ([facility]) 08/21/2024    Elevated troponin 08/21/2024

## 2024-08-29 NOTE — FLOWSHEET NOTE
08/29/24 1610   Vital Signs   BP (!) 100/59   Temp 97.6 °F (36.4 °C)   Pulse 75   Respirations 18   Weight - Scale 63.1 kg (139 lb 1.8 oz)   Weight Method Bed scale   Percent Weight Change -0.63   Pain Assessment   Pain Assessment None - Denies Pain   Post-Hemodialysis Assessment   Post-Treatment Procedures Blood returned;Catheter capped, clamped with Citrate x 2 ports   Machine Disinfection Process Acid/Vinegar Clean;Exterior Machine Disinfection;Heat Disinfect   Rinseback Volume (ml) 300 ml   Blood Volume Processed (Liters) 32.6 L   Dialyzer Clearance Lightly streaked   Duration of Treatment (minutes) 120 minutes   Hemodialysis Intake (ml) 300 ml   Hemodialysis Output (ml) 600 ml   NET Removed (ml) 300   Tolerated Treatment Good   Patient Response to Treatment Net -300mL off with first dialysis treatment. HD Cath workd great.  Vitals stable throughout treatment.  Report called to KT Singletary.  Pt returned via transport to room.   Bilateral Breath Sounds Diminished   Edema None   Time Off 1538   Patient Disposition Return to room   Observations & Evaluations   Level of Consciousness 0   Oriented X 3   Heart Rhythm Irregular   Respiratory Quality/Effort Unlabored   O2 Device Nasal cannula   Skin Color Jaundice;Hyperpigmentation   Skin Condition/Temp Fragile   Abdomen Inspection Soft   Bowel Sounds (All Quadrants) Audible

## 2024-08-29 NOTE — PROGRESS NOTES
Dialysis Progress Note  Jass Velazquez MD    Summary Statement:  We are following this patient for ANGIE on CKD Stage 4. [facility]    Interval History:    8/26: Patient seen and examined at bedside. No family members present. A sitter is at bedside which had been assigned for some previous episodes of confusion. Patient is resting, not in any acute distress. He does seem to exhibit some mild confusion though does not appear grossly uremic.     8/27:   Seen and examined  No acute events overnight  Making modest amount of urine  Denies shortness of breath  Continues to have relatively meager appetite and energy levels  However, denies overtly uremic symptoms    8/28:  Patient seen and examined  Mood different today, he is now much more open about his lack of energy and after extensive back and forth, he has decided he wants to pursue dialysis    Poor appetite and energy levels  No acute events overnight  D/w nursing staff    8/29: Patient seen and examined twice during the day. Seen in the morning prior to his TDC placement. Not in any distress. He is a little anxious about starting dialysis.    Was later seen on return during HD at 2:00 PM. So far, tolerating treatment. Prescription reviewed with dialysis RN. No cramps, chest pain or shortness of breath noted.         Vital SignsBP 93/68   Pulse 70   Temp 97.9 °F (36.6 °C)   Resp 18   Ht 1.727 m (5' 7.99\")   Wt 63.5 kg (139 lb 15.9 oz)   SpO2 99%   BMI 21.29 kg/m²   24HR INTAKE/OUTPUT:  No intake or output data in the 24 hours ending 08/29/24 1450          PHYSICAL EXAM        Neck: No JVD, now with RIJ TDC  Lungs: essentially clear to auscultation  Heart: Regular rate and rhythm. No S3 gallop. No  murmrur.  Abdomen: Soft non distended, non tender and normal bowel sounds.  Extremeties:   No edema.  Neuro: AOx3, no observable tremor          Current Facility-Administered Medications   Medication Dose Route Frequency Provider Last Rate Last  SubCUTAneous PRN Du, Mauro A, DO        dextrose 10 % infusion   IntraVENous Continuous PRN Du, Mauro A, DO        insulin lispro (HUMALOG,ADMELOG) injection vial 0-8 Units  0-8 Units SubCUTAneous TID WC Du, Mauro A, DO   2 Units at 08/26/24 1711    insulin lispro (HUMALOG,ADMELOG) injection vial 0-4 Units  0-4 Units SubCUTAneous Nightly Du, Mauro A, DO   4 Units at 08/24/24 2147    acetaminophen (TYLENOL) tablet 500 mg  500 mg Oral Q6H PRN Du, Mauro A, DO        heparin (porcine) injection 5,000 Units  5,000 Units SubCUTAneous 3 times per day Du, Mauro A, DO   5,000 Units at 08/28/24 1521    prochlorperazine (COMPAZINE) injection 5 mg  5 mg IntraVENous Q6H PRN Du, Mauro A, DO        polyethylene glycol (GLYCOLAX) packet 17 g  17 g Oral Daily PRN Du, Mauro A, DO        alogliptin (NESINA) tablet 6.25 mg  6.25 mg Oral Daily Du, Mauro A, DO   6.25 mg at 08/29/24 0807    albuterol (PROVENTIL) (2.5 MG/3ML) 0.083% nebulizer solution 2.5 mg  2.5 mg Nebulization Q6H PRN Du, Mauro A, DO   2.5 mg at 08/29/24 0708       IR FLUORO GUIDED CVA DEVICE PLMT/REPLACE/REMOVAL         IR ULTRASOUND GUIDANCE VASCULAR ACCESS         XR CHEST (2 VW)   Final Result   1.  Improvement in the appearance of chest compared to 08/24/2024.      2.  Small to moderate bilateral pleural effusions         XR CHEST PORTABLE   Final Result   1. Right perihilar and right lower lobe airspace disease   2. Moderate right pleural effusion   3. Patchy left lower lobe airspace disease   4. Mild cardiomegaly.               LAB DATA     BMP, Mg, Phos    Lab Results   Component Value Date    CREATININE 5.2 (H) 08/29/2024    CREATININE 5.3 (H) 08/28/2024    CREATININE 5.5 (H) 08/27/2024    CREATININE 5.4 (H) 08/26/2024    CREATININE 5.2 (H) 08/25/2024    CREATININE 5.0 (H) 08/25/2024    CREATININE 4.7 (H) 08/24/2024       CBC:   Recent Labs     08/27/24  0917 08/28/24  0905 08/29/24  0424   WBC 6.4 5.8  POST-OP DIAGNOSIS:  Uterine myoma 31-Aug-2019 13:25:52  Aditi Mcpherson

## 2024-08-29 NOTE — PLAN OF CARE
Problem: Discharge Planning  Goal: Discharge to home or other facility with appropriate resources  8/29/2024 0943 by Gunjan Phelan RN  Outcome: Progressing  8/28/2024 2146 by Laurel Blevins RN  Outcome: Progressing     Problem: Pain  Goal: Verbalizes/displays adequate comfort level or baseline comfort level  8/29/2024 0943 by Gunjan Phelan RN  Outcome: Progressing  8/28/2024 2146 by Laurel Blevins RN  Outcome: Progressing     Problem: Safety - Adult  Goal: Free from fall injury  8/29/2024 0943 by Gunjan Phelan RN  Outcome: Progressing  8/28/2024 2146 by Laurel Blevins RN  Outcome: Progressing     Problem: Risk for Elopement  Goal: Patient will not exit the unit/facility without proper excort  8/29/2024 0943 by Gunjan Phelan RN  Outcome: Progressing  8/28/2024 2146 by Laurel Blevins RN  Outcome: Progressing

## 2024-08-30 PROBLEM — E44.0 MODERATE PROTEIN-CALORIE MALNUTRITION (HCC): Chronic | Status: ACTIVE | Noted: 2024-08-30

## 2024-08-30 LAB
ALBUMIN SERPL-MCNC: 2.3 G/DL (ref 3.5–5.2)
ALP SERPL-CCNC: 86 U/L (ref 40–129)
ALT SERPL-CCNC: 23 U/L (ref 0–40)
ANION GAP SERPL CALCULATED.3IONS-SCNC: 11 MMOL/L (ref 7–16)
AST SERPL-CCNC: 33 U/L (ref 0–39)
BASOPHILS # BLD: 0.02 K/UL (ref 0–0.2)
BASOPHILS NFR BLD: 0 % (ref 0–2)
BILIRUB SERPL-MCNC: 0.3 MG/DL (ref 0–1.2)
BUN SERPL-MCNC: 24 MG/DL (ref 6–23)
CALCIUM SERPL-MCNC: 8.4 MG/DL (ref 8.6–10.2)
CHLORIDE SERPL-SCNC: 102 MMOL/L (ref 98–107)
CO2 SERPL-SCNC: 26 MMOL/L (ref 22–29)
CREAT SERPL-MCNC: 3.8 MG/DL (ref 0.7–1.2)
EOSINOPHIL # BLD: 0.14 K/UL (ref 0.05–0.5)
EOSINOPHILS RELATIVE PERCENT: 2 % (ref 0–6)
ERYTHROCYTE [DISTWIDTH] IN BLOOD BY AUTOMATED COUNT: 13.9 % (ref 11.5–15)
GFR, ESTIMATED: 16 ML/MIN/1.73M2
GLUCOSE BLD-MCNC: 136 MG/DL (ref 74–99)
GLUCOSE BLD-MCNC: 152 MG/DL (ref 74–99)
GLUCOSE BLD-MCNC: 80 MG/DL (ref 74–99)
GLUCOSE SERPL-MCNC: 81 MG/DL (ref 74–99)
HCT VFR BLD AUTO: 33 % (ref 37–54)
HGB BLD-MCNC: 10.6 G/DL (ref 12.5–16.5)
IMM GRANULOCYTES # BLD AUTO: <0.03 K/UL (ref 0–0.58)
IMM GRANULOCYTES NFR BLD: 0 % (ref 0–5)
LYMPHOCYTES NFR BLD: 0.87 K/UL (ref 1.5–4)
LYMPHOCYTES RELATIVE PERCENT: 14 % (ref 20–42)
MCH RBC QN AUTO: 29.5 PG (ref 26–35)
MCHC RBC AUTO-ENTMCNC: 32.1 G/DL (ref 32–34.5)
MCV RBC AUTO: 91.9 FL (ref 80–99.9)
MONOCYTES NFR BLD: 0.69 K/UL (ref 0.1–0.95)
MONOCYTES NFR BLD: 11 % (ref 2–12)
NEUTROPHILS NFR BLD: 72 % (ref 43–80)
NEUTS SEG NFR BLD: 4.36 K/UL (ref 1.8–7.3)
PLATELET # BLD AUTO: 259 K/UL (ref 130–450)
PMV BLD AUTO: 10.4 FL (ref 7–12)
POTASSIUM SERPL-SCNC: 4.1 MMOL/L (ref 3.5–5)
PROT SERPL-MCNC: 5 G/DL (ref 6.4–8.3)
RBC # BLD AUTO: 3.59 M/UL (ref 3.8–5.8)
SODIUM SERPL-SCNC: 139 MMOL/L (ref 132–146)
WBC OTHER # BLD: 6.1 K/UL (ref 4.5–11.5)

## 2024-08-30 PROCEDURE — 82962 GLUCOSE BLOOD TEST: CPT

## 2024-08-30 PROCEDURE — 90935 HEMODIALYSIS ONE EVALUATION: CPT

## 2024-08-30 PROCEDURE — 2060000000 HC ICU INTERMEDIATE R&B

## 2024-08-30 PROCEDURE — 94640 AIRWAY INHALATION TREATMENT: CPT

## 2024-08-30 PROCEDURE — 80053 COMPREHEN METABOLIC PANEL: CPT

## 2024-08-30 PROCEDURE — 2580000003 HC RX 258: Performed by: INTERNAL MEDICINE

## 2024-08-30 PROCEDURE — 6360000002 HC RX W HCPCS: Performed by: INTERNAL MEDICINE

## 2024-08-30 PROCEDURE — 6370000000 HC RX 637 (ALT 250 FOR IP): Performed by: INTERNAL MEDICINE

## 2024-08-30 PROCEDURE — 2700000000 HC OXYGEN THERAPY PER DAY

## 2024-08-30 PROCEDURE — 85025 COMPLETE CBC W/AUTO DIFF WBC: CPT

## 2024-08-30 PROCEDURE — 97535 SELF CARE MNGMENT TRAINING: CPT

## 2024-08-30 PROCEDURE — 97110 THERAPEUTIC EXERCISES: CPT

## 2024-08-30 RX ADMIN — ALOGLIPTIN 6.25 MG: 6.25 TABLET, FILM COATED ORAL at 11:20

## 2024-08-30 RX ADMIN — WATER 1000 MG: 1 INJECTION INTRAMUSCULAR; INTRAVENOUS; SUBCUTANEOUS at 11:20

## 2024-08-30 RX ADMIN — ACETAMINOPHEN 500 MG: 500 TABLET ORAL at 17:21

## 2024-08-30 RX ADMIN — Medication 1 TABLET: at 11:20

## 2024-08-30 RX ADMIN — PANTOPRAZOLE SODIUM 40 MG: 40 TABLET, DELAYED RELEASE ORAL at 11:20

## 2024-08-30 RX ADMIN — EZETIMIBE 10 MG: 10 TABLET ORAL at 11:20

## 2024-08-30 RX ADMIN — BUPROPION HYDROCHLORIDE 300 MG: 150 TABLET, EXTENDED RELEASE ORAL at 11:20

## 2024-08-30 RX ADMIN — HEPARIN SODIUM 5000 UNITS: 5000 INJECTION INTRAVENOUS; SUBCUTANEOUS at 20:42

## 2024-08-30 RX ADMIN — ACETAMINOPHEN 500 MG: 500 TABLET ORAL at 03:02

## 2024-08-30 RX ADMIN — ARFORMOTEROL TARTRATE: 15 SOLUTION RESPIRATORY (INHALATION) at 18:44

## 2024-08-30 RX ADMIN — HEPARIN SODIUM 5000 UNITS: 5000 INJECTION INTRAVENOUS; SUBCUTANEOUS at 14:11

## 2024-08-30 RX ADMIN — ATORVASTATIN CALCIUM 80 MG: 40 TABLET, FILM COATED ORAL at 20:42

## 2024-08-30 RX ADMIN — DOXYCYCLINE HYCLATE 100 MG: 100 CAPSULE ORAL at 11:28

## 2024-08-30 RX ADMIN — METOPROLOL SUCCINATE 50 MG: 50 TABLET, EXTENDED RELEASE ORAL at 11:20

## 2024-08-30 RX ADMIN — SODIUM BICARBONATE 650 MG: 650 TABLET ORAL at 14:11

## 2024-08-30 RX ADMIN — DOXYCYCLINE HYCLATE 100 MG: 100 CAPSULE ORAL at 20:42

## 2024-08-30 RX ADMIN — ACETAMINOPHEN 500 MG: 500 TABLET ORAL at 22:59

## 2024-08-30 RX ADMIN — SODIUM BICARBONATE 650 MG: 650 TABLET ORAL at 11:21

## 2024-08-30 ASSESSMENT — PAIN DESCRIPTION - LOCATION
LOCATION: NECK
LOCATION: CHEST

## 2024-08-30 ASSESSMENT — PAIN SCALES - GENERAL
PAINLEVEL_OUTOF10: 3
PAINLEVEL_OUTOF10: 3
PAINLEVEL_OUTOF10: 2
PAINLEVEL_OUTOF10: 5

## 2024-08-30 ASSESSMENT — PAIN DESCRIPTION - DESCRIPTORS: DESCRIPTORS: ACHING;SORE

## 2024-08-30 ASSESSMENT — PAIN DESCRIPTION - ORIENTATION: ORIENTATION: RIGHT

## 2024-08-30 ASSESSMENT — PAIN - FUNCTIONAL ASSESSMENT: PAIN_FUNCTIONAL_ASSESSMENT: ACTIVITIES ARE NOT PREVENTED

## 2024-08-30 NOTE — PROGRESS NOTES
Comprehensive Nutrition Assessment    Type and Reason for Visit:  Initial, Positive Nutrition Screen (LOS)    Nutrition Recommendations/Plan:   Continue Current Diet  Begin ONS (high calorie/high protein) TID  Consult RD as needed      Malnutrition Assessment:  Malnutrition Status:  Moderate malnutrition (08/30/24 1426)    Context:  Chronic Illness     Findings of the 6 clinical characteristics of malnutrition:  Energy Intake:  Mild decrease in energy intake (Comment) (poor po intake prior to admission 2/2 SOB)  Weight Loss:  Unable to assess     Body Fat Loss:   (moderate) Orbital, Triceps   Muscle Mass Loss:   (moderate) Temples (temporalis), Clavicles (pectoralis & deltoids)  Fluid Accumulation:  No significant fluid accumulation     Strength:  Not Performed    Nutrition Assessment:    Pt admit for CHF, ANGIE, Pleural effusions. Pt was seen in ED last week w/ renal failure and CHF and left AMA, pt returned and admitted 08/24/24. PMHx: None on file. Pt reports fair appetite eating 26-50% of all meals. Will provide ONS TID, continue inpatient monitoring, f/up per policy.    Nutrition Related Findings:    A/O x4, I/O +304 since admit, abd wdl, bowel sounds active x4, elevated BUN/Creatinine, GFR 11, H&H Low Wound Type: None       Current Nutrition Intake & Therapies:    Average Meal Intake: 26-50%  Average Supplements Intake: None Ordered  ADULT DIET; Regular; 4 carb choices (60 gm/meal)    Anthropometric Measures:  Height: 172.7 cm (5' 7.99\")  Ideal Body Weight (IBW): 154 lbs (70 kg)    Admission Body Weight: 63.5 kg (139 lb 15.9 oz)  Current Body Weight: 63.5 kg (139 lb 15.9 oz), 90.9 % IBW. Weight Source: Bed Scale (08/29/24)  Current BMI (kg/m2): 21.3  Usual Body Weight: 63.5 kg (139 lb 15.9 oz) (08/20/24)  % Weight Change (Calculated): 0  Weight Adjustment For: No Adjustment     BMI Categories: Underweight (BMI less than 22) age over 65    Estimated Daily Nutrient Needs:  Energy Requirements Based On:

## 2024-08-30 NOTE — PROGRESS NOTES
Department of Internal Medicine        CHIEF COMPLAINT:  + SOB with exertion, intermittent upper abdominal pain    Reason for Admission: Acute renal failure, CHF    HISTORY OF PRESENT ILLNESS:      The patient is a 74 y.o. male who presents with increased shortness of breath has been going on for about a month.  Patient was seen in the ED 2 days ago with the same symptoms of shortness of breath when he lays down and when he ambulates.  Patient said the symptoms going on for about a month.  He denies any chest pain or palpitations or dizziness.  He denies any fever/chills or productive cough.  He denies any history of heart problems.  Patient's BUN/creatinine and was elevated at 30/4.4 with.  No history of kidney disease in the past.  His proBNP was greater than 70,000.  Troponin was 81.  CBC was normal.  Patient random blood sugar though was also 253.  Patient signed out AMA but came back into the hospital because his [facility] physician[facility]nurse practitioner and told to come back in.  Patient states symptoms about the same with a WBC 6.3 and hemoglobin 13.0 today.  Pending repeat BMP today.  Temperature was 98 with a heart rate of 110 and blood pressure 94/78.  O2 sat 98% on room air at rest.  Chest x-ray was ordered and pending.  The patient did have a CTA of the lungs that on the 17th which showed no evidence of PE but did show bilateral pleural effusion.    8/20/2024  Patient seen examined on PCU.  Patient states he feels a lot better today.  Patient denies any current chest pain, abdominal pain, nausea or vomiting or unusual shortness of breath.  BUN/creatinine was 29/4.3.  Blood sugars range .  Hemoglobin A1c 7.4.  WBC 5.7 with hemoglobin 11.7.  Temperature 98.3 with heart rate 84 blood pressure 136/65.  O2 sat 94% on room air at rest.  CT abdomen pelvis yesterday showed no acute renal obstruction.  Possible constipation with moderate bladder distention.  Bladder scan today.  Pending echocardiogram      TSH:    Lab Results   Component Value Date/Time    TSH 3.83 08/20/2024 06:58 AM     IRON:    Lab Results   Component Value Date/Time    IRON 41 08/23/2024 05:46 AM     LIPASE:  No results found for: \"LIPASE\"    ASSESSMENT AND PLAN:      Patient Active Problem List    Diagnosis Date Noted    Acute decompensated heart failure ([facility]) 08/21/2024    Shortness of breath 08/21/2024    NICM (nonischemic cardiomyopathy) ([facility]) 08/21/2024    Elevated troponin 08/21/2024    Acute renal failure (ARF) ([facility]) 08/19/2024    ANGIE (acute kidney injury) ([facility]) 08/17/2024     Impression:  Acute kidney injury  Acute hypoxic respiratory failure  History of chronic kidney disease stage IV with baseline serum creatinine 3.3 April 2024  Acute on chronic systolic and diastolic congestive heart failure with bilateral pleural effusions   Right perihilar and right lower lobe airspace disease associated with moderate right pleural effusion, patchy left lower lobe airspace disease on chest x-ray 8/24  Current tobacco abuse and probably underlying COPD  Non-insulin-dependent diabetes mellitus with hyperglycemia-random blood sugar greater than 250 - hemoglobin A1c 7.4  Orthostatic hypotension  Bacteriuria-urine culture showed mixed alexandrea with gram-positive organisms  History of hyperlipidemia  History of hypertension with hypotension on admission-94/78-resolved  History of depression  History of coronary artery disease  History of peripheral vascular disease  Vitamin D deficiency    Plan:  Patient admitted to monitored bed  Home medication reviewed  Activity up ad isrrael.  Hemoglobin A1c  Heparin 5000 units subcu every 8  Compazine 5 mg IV push every 6 hours as needed for nausea  Glucoscans 4 times daily with sliding scale insulin  General Diet with salt restriction  Accurate I's and O's  Echocardiogram  DuoNeb aerosols 4 times daily  Pulmicort aerosol twice daily  CT of the abdomen pelvis with oral contrast  Spot urine sodium  Spot urine

## 2024-08-30 NOTE — FLOWSHEET NOTE
08/30/24 1115   Vital Signs   /81   Temp 98 °F (36.7 °C)   Pulse 84   Respirations 18   Post-Hemodialysis Assessment   Post-Treatment Procedures Blood returned;Catheter capped, clamped and heparinized x 2 ports   Machine Disinfection Process Acid/Vinegar Clean;Heat Disinfect   Rinseback Volume (ml) 300 ml   Blood Volume Processed (Liters) 52 L   Dialyzer Clearance Lightly streaked   Duration of Treatment (minutes) 180 minutes   Heparin Amount Administered During Treatment (mL) 0 mL   Hemodialysis Intake (ml) 300 ml   Hemodialysis Output (ml) 1800 ml   NET Removed (ml) 1500   Patient Response to Treatment tolerated tx well today.

## 2024-08-30 NOTE — PLAN OF CARE
Problem: Risk for Elopement  Goal: Patient will not exit the unit/facility without proper excort  8/30/2024 1022 by Gunjan Phelan RN  Outcome: Progressing  8/30/2024 0117 by Laurel Blevins RN  Outcome: Progressing     Problem: Safety - Adult  Goal: Free from fall injury  8/30/2024 1022 by Gunjan Phelan RN  Outcome: Progressing  8/30/2024 0117 by Laurel Blevins RN  Outcome: Progressing     Problem: Pain  Goal: Verbalizes/displays adequate comfort level or baseline comfort level  8/30/2024 1022 by Gunjan Phelan RN  Outcome: Progressing  8/30/2024 0117 by Laurel Blevins RN  Outcome: Progressing     Problem: Discharge Planning  Goal: Discharge to home or other facility with appropriate resources  8/30/2024 1022 by Gunjan Phelan RN  Outcome: Progressing  8/30/2024 0117 by Laurel Blevins, RN  Outcome: Progressing

## 2024-08-30 NOTE — CARE COORDINATION
8/30/241600 CM note: covid (-) 8/25/24, urine cx (+) 8/22/24. IV rocephin and po vibramycin. PIV x 1. 2L nc; NO home O2. IF HOME O2 IS NEEDED WILL NEED QUALIFYING DOCUMENTATION AND O2 SCRIPT. Pt had tunneled dialysis catheter placement 8/29/24 and his 2nd HD treatment today. [facility] [place] [facility] requesting Pricila Haskins yesterday, faxed confirmation of tunneled HD catheter placement, hep panel, and first (2) HD treatments today. Awaiting outpt chairtime. Discharge plan is home with [facility]. [facility] accepted under pts Medicare. HHC order noted. Pt lives alone in a mobile home, 4 LISA. Pt reports being mostly ind with adls pta. Pt has the following DME: spc, sc & std walker. Pts friend Mark Anthony will provide transportation home. CM will follow. Electronically signed by Tatiana Gray RN on 8/30/2024 at 4:11 PM      Update: 8/30/24 1648 Per Pricila Vieyra, pts outpt HD chairtime will be at [facility] arrive at 4pm. [place] requests to be called if patient is discharge this weekend by calling [phone]. Updated pt and pts KT Hollins on above. Electronically signed by Tatiana Gray RN on 8/30/2024 at 4:51 PM

## 2024-08-30 NOTE — PROGRESS NOTES
Dialysis Progress Note  Jass Velazquez MD    Summary Statement:  We are following this patient for ANGIE on CKD Stage 4. [facility].    Interval History:    8/26: Patient seen and examined at bedside. No family members present. A sitter is at bedside which had been assigned for some previous episodes of confusion. Patient is resting, not in any acute distress. He does seem to exhibit some mild confusion though does not appear grossly uremic.     8/27:   Seen and examined  No acute events overnight  Making modest amount of urine  Denies shortness of breath  Continues to have relatively meager appetite and energy levels  However, denies overtly uremic symptoms    8/28:  Patient seen and examined  Mood different today, he is now much more open about his lack of energy and after extensive back and forth, he has decided he wants to pursue dialysis    Poor appetite and energy levels  No acute events overnight  D/w nursing staff    8/29: Patient seen and examined twice during the day. Seen in the morning prior to his TDC placement. Not in any distress. He is a little anxious about starting dialysis.    Was later seen on return during HD at 2:00 PM. So far, tolerating treatment. Prescription reviewed with dialysis RN. No cramps, chest pain or shortness of breath noted.     8/30: Seen and examined on treatment at 10:10 AM. Case discussed with dialysis nursing staff. Treatment has been well tolerated. Patient is comfortable, not in distress. Energy levels and appetite both improved. Denies cramps, nausea, shortness of breath today.        Vital SignsBP 138/61   Pulse 71   Temp 98.1 °F (36.7 °C) (Oral)   Resp 20   Ht 1.727 m (5' 7.99\")   Wt 63.1 kg (139 lb 1.8 oz)   SpO2 96%   BMI 21.16 kg/m²   24HR INTAKE/OUTPUT:    Intake/Output Summary (Last 24 hours) at 8/30/2024 1530  Last data filed at 8/30/2024 1115  Gross per 24 hour   Intake 600 ml   Output 2400 ml   Net -1800 ml             PHYSICAL  mcg-budesonide 0.5 mg neb solution   Nebulization BID RT Du, Mauro A, DO   Given at 08/29/24 1903    glucose chewable tablet 16 g  4 tablet Oral PRN Du, Mauro A, DO        dextrose bolus 10% 125 mL  125 mL IntraVENous PRN Du, Mauro A, DO        Or    dextrose bolus 10% 250 mL  250 mL IntraVENous PRN Du, Mauro A, DO        glucagon injection 1 mg  1 mg SubCUTAneous PRN Du, Mauro A, DO        dextrose 10 % infusion   IntraVENous Continuous PRN Du, Mauro A, DO        insulin lispro (HUMALOG,ADMELOG) injection vial 0-8 Units  0-8 Units SubCUTAneous TID WC Du, Mauro A, DO   2 Units at 08/26/24 1711    insulin lispro (HUMALOG,ADMELOG) injection vial 0-4 Units  0-4 Units SubCUTAneous Nightly Du, Mauro A, DO   4 Units at 08/24/24 2147    acetaminophen (TYLENOL) tablet 500 mg  500 mg Oral Q6H PRN Du, Mauro A, DO   500 mg at 08/30/24 0302    heparin (porcine) injection 5,000 Units  5,000 Units SubCUTAneous 3 times per day Du, Mauro A, DO   5,000 Units at 08/30/24 1411    prochlorperazine (COMPAZINE) injection 5 mg  5 mg IntraVENous Q6H PRN Du, Mauro A, DO        polyethylene glycol (GLYCOLAX) packet 17 g  17 g Oral Daily PRN Du, Mauro A, DO        alogliptin (NESINA) tablet 6.25 mg  6.25 mg Oral Daily Du, Mauro A, DO   6.25 mg at 08/30/24 1120    albuterol (PROVENTIL) (2.5 MG/3ML) 0.083% nebulizer solution 2.5 mg  2.5 mg Nebulization Q6H PRN Du, Mauro A, DO   2.5 mg at 08/29/24 0708       IR FLUORO GUIDED CVA DEVICE PLMT/REPLACE/REMOVAL   Final Result   Successful placement of a tunneled dialysis catheter via the right internal   jugular vein.      Administration of conscious sedation.         IR ULTRASOUND GUIDANCE VASCULAR ACCESS   Final Result   Successful placement of a tunneled dialysis catheter via the right internal   jugular vein.      Administration of conscious sedation.         XR CHEST (2 VW)   Final Result   1.  Improvement in the  baseline serum creatinine of 3.3 mg/dL in April 2024 and 3.6 mg/dL in February 2024.  Patient now with acute kidney injury which may have a component of contrast induced nephrotoxicity.  Patient with no  improvement of the renal function despite hydration.  Patient has borderline low blood pressure readings and that may be contributing to the patient's renal hypoperfusion.  Patient initiated on HD via [facility] TDC 8/29. Tolerated 2 hours yesterday, 3 hours today. Will dialyze 4 hours tomorrow. Await acceptance to outpatient facility, patient's preference being [facility]. He is tolerating treatment well and feeling better/less uremic.    Hypertension with chronic kidney disease stage G1 to stage G4   BP 120s-130s/60s-80s on treatment. He is at goal.     Metabolic acidosis.  Metabolic acidosis with  normal anion gap.  Metabolic acidosis is mild.  Will supplement bicarbonate target a bicarb level of 22 mmol/L. CO2 improved 26 today. Will stop sodium bicarbonate now that patient is on dialysis.    Hyperkalemia.  Hyperkalemia secondary to chronic kidney disease and consequently limited renal potassium excretion ability.  Patient may have had some high potassium content foods while out of the hospital.  Improved.  Will add Lokelma.  Restrict dietary potassium.  Follow repeat potassium levels.  K stable 4.1 today.    Anemia of  chronic kidney disease.  We will check serum iron studies.  Follow hemoglobin and hematocrit.  Follow serum iron studies.  If needed will use JEREL and titrate the dose to a target hemoglobin of 10.0 g/dL. Hb at goal 10.6 g/dL today.    Hypocalcemia.  This may reflect vitamin D deficiency as well as hypoalbuminemia.  Patient on vitamin D supplement.    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  Patient with a PTH level of 337.1 pg/ml and vitamin D level of 14.1 ng/ml.  Will start the patient on vitamin D supplement.    Chronic kidney disease stage G4 of unknown

## 2024-08-30 NOTE — PROGRESS NOTES
OCCUPATIONAL THERAPY TREATMENT NOTE    [facility]   [address]        Date:2024  Patient Name: Deion Knight  MRN: [id number]  : 1950  Room: [address] [place] [address]     Evaluating OT: Juanpablo Siddiqi OTR/L; #[id number]      Referring Provider and Specific Provider Orders/Date:      24   OT eval and treat  Start:  24,   End:  24 1030,   ONE TIME,   Standing Count:  1 Occurrences,   R         Mauro Du,        Placement Recommendation: Home with occupational therapy        Diagnosis:   1. Acute respiratory failure with hypoxia (HCC)    2. COPD exacerbation (HCC)         Surgery: None       Pertinent Medical History:       Past Medical History   History reviewed. No pertinent past medical history.         Past Surgical History   History reviewed. No pertinent surgical history.        Precautions:  Activity as tolerated, falls      Assessment of current deficits:     [x] Functional mobility            [x]ADLs           [x] Strength                   []Cognition    [x] Functional transfers          [x] IADLs          [] Safety Awareness   [x]Endurance    [] Fine Coordination              [x] Balance      [] Vision/perception    []Sensation      []Gross Motor Coordination  [] ROM           [] Delirium                   [] Motor Control      OT PLAN OF CARE   OT POC based on physician orders, patient diagnosis and results of clinical assessment     Frequency/Duration 1-3 days/wk for 2 weeks PRN      Specific OT Treatment Interventions to include:   * Instruction/training on adapted ADL techniques and AE recommendations to increase functional independence within precautions       * Training on energy conservation strategies, correct breathing pattern and techniques to improve independence/tolerance for self-care routine  * Functional transfer/mobility training/DME recommendations for increased independence, safety, and fall  prevention  * Therapeutic exercise to improve motor endurance, ROM, and functional strength for ADLs/functional transfers  * Therapeutic activities to facilitate/challenge dynamic balance, stand tolerance for increased safety and independence with ADLs     Recommended Adaptive Equipment: None at this time     Home Living: Patient lives alone in a mobile home  with 4 steps, bilateral rail  to enter home.  Tub shower        Equipment owned: Cane, Wheeled Walker, and Shower chair,       Prior Level of Function: Independent with ADLs , Independent with IADLs; ambulated without AD     Driving: Yes  Occupation: Retired     Pain Level: 0/10 pain reported               Cognition: A&O: 4/4; Follows Multiple step directions              Memory: good               Sequencing: good               Problem solving: good               Judgement/safety: fair      [facility]         AM-PAC Daily Activity - Inpatient   How much help is needed for putting on and taking off regular lower body clothing?: A Little  How much help is needed for bathing (which includes washing, rinsing, drying)?: A Little  How much help is needed for toileting (which includes using toilet, bedpan, or urinal)?: A Little  How much help is needed for putting on and taking off regular upper body clothing?: None  How much help is needed for taking care of personal grooming?: A Little  How much help for eating meals?: None  AM-[facility] Inpatient Daily Activity Raw Score: 20  AM-PAC Inpatient ADL T-Scale Score : 42.03  ADL Inpatient CMS 0-100% Score: 38.32  ADL Inpatient CMS G-Code Modifier : KYLAH                Functional Assessment:     Initial Eval Status  Date: 8/27/24 Treatment Status  Date: 8/30/24 STGs = LTGs  Time frame: 10-14 days   Feeding Independent  Independent  Independent    Grooming Supervision  Supervision sitting EOB  Independent    UB Dressing Independent  Independent to [facility] gown  Independent    LB Dressing Supervision  Supervision to

## 2024-08-31 VITALS
DIASTOLIC BLOOD PRESSURE: 73 MMHG | SYSTOLIC BLOOD PRESSURE: 128 MMHG | BODY MASS INDEX: 20.58 KG/M2 | HEART RATE: 77 BPM | RESPIRATION RATE: 16 BRPM | HEIGHT: 68 IN | TEMPERATURE: 97.8 F | OXYGEN SATURATION: 100 % | WEIGHT: 135.8 LBS

## 2024-08-31 LAB
25(OH)D3 SERPL-MCNC: 11.9 NG/ML (ref 30–100)
ALBUMIN SERPL-MCNC: 2.3 G/DL (ref 3.5–5.2)
ALP SERPL-CCNC: 91 U/L (ref 40–129)
ALT SERPL-CCNC: 25 U/L (ref 0–40)
ANION GAP SERPL CALCULATED.3IONS-SCNC: 5 MMOL/L (ref 7–16)
AST SERPL-CCNC: 36 U/L (ref 0–39)
BASOPHILS # BLD: 0.03 K/UL (ref 0–0.2)
BASOPHILS NFR BLD: 1 % (ref 0–2)
BILIRUB SERPL-MCNC: 0.2 MG/DL (ref 0–1.2)
BUN SERPL-MCNC: 16 MG/DL (ref 6–23)
CALCIUM SERPL-MCNC: 7.9 MG/DL (ref 8.6–10.2)
CHLORIDE SERPL-SCNC: 102 MMOL/L (ref 98–107)
CO2 SERPL-SCNC: 30 MMOL/L (ref 22–29)
CREAT SERPL-MCNC: 2.7 MG/DL (ref 0.7–1.2)
EOSINOPHIL # BLD: 0.15 K/UL (ref 0.05–0.5)
EOSINOPHILS RELATIVE PERCENT: 3 % (ref 0–6)
ERYTHROCYTE [DISTWIDTH] IN BLOOD BY AUTOMATED COUNT: 13.9 % (ref 11.5–15)
GFR, ESTIMATED: 24 ML/MIN/1.73M2
GLUCOSE BLD-MCNC: 108 MG/DL (ref 74–99)
GLUCOSE BLD-MCNC: 228 MG/DL (ref 74–99)
GLUCOSE BLD-MCNC: 71 MG/DL (ref 74–99)
GLUCOSE SERPL-MCNC: 121 MG/DL (ref 74–99)
HCT VFR BLD AUTO: 33.7 % (ref 37–54)
HGB BLD-MCNC: 10.6 G/DL (ref 12.5–16.5)
IMM GRANULOCYTES # BLD AUTO: <0.03 K/UL (ref 0–0.58)
IMM GRANULOCYTES NFR BLD: 0 % (ref 0–5)
LYMPHOCYTES NFR BLD: 0.8 K/UL (ref 1.5–4)
LYMPHOCYTES RELATIVE PERCENT: 16 % (ref 20–42)
MCH RBC QN AUTO: 29.3 PG (ref 26–35)
MCHC RBC AUTO-ENTMCNC: 31.5 G/DL (ref 32–34.5)
MCV RBC AUTO: 93.1 FL (ref 80–99.9)
MONOCYTES NFR BLD: 0.45 K/UL (ref 0.1–0.95)
MONOCYTES NFR BLD: 9 % (ref 2–12)
NEUTROPHILS NFR BLD: 72 % (ref 43–80)
NEUTS SEG NFR BLD: 3.64 K/UL (ref 1.8–7.3)
PHOSPHATE SERPL-MCNC: 2.8 MG/DL (ref 2.5–4.5)
PLATELET # BLD AUTO: 233 K/UL (ref 130–450)
PMV BLD AUTO: 10.3 FL (ref 7–12)
POTASSIUM SERPL-SCNC: 3.7 MMOL/L (ref 3.5–5)
PROT SERPL-MCNC: 4.8 G/DL (ref 6.4–8.3)
PTH-INTACT SERPL-MCNC: 596.7 PG/ML (ref 15–65)
RBC # BLD AUTO: 3.62 M/UL (ref 3.8–5.8)
SODIUM SERPL-SCNC: 137 MMOL/L (ref 132–146)
WBC OTHER # BLD: 5.1 K/UL (ref 4.5–11.5)

## 2024-08-31 PROCEDURE — 6360000002 HC RX W HCPCS: Performed by: INTERNAL MEDICINE

## 2024-08-31 PROCEDURE — 2580000003 HC RX 258: Performed by: INTERNAL MEDICINE

## 2024-08-31 PROCEDURE — 90935 HEMODIALYSIS ONE EVALUATION: CPT

## 2024-08-31 PROCEDURE — 84100 ASSAY OF PHOSPHORUS: CPT

## 2024-08-31 PROCEDURE — 85025 COMPLETE CBC W/AUTO DIFF WBC: CPT

## 2024-08-31 PROCEDURE — 80053 COMPREHEN METABOLIC PANEL: CPT

## 2024-08-31 PROCEDURE — 83970 ASSAY OF PARATHORMONE: CPT

## 2024-08-31 PROCEDURE — 82306 VITAMIN D 25 HYDROXY: CPT

## 2024-08-31 PROCEDURE — 82962 GLUCOSE BLOOD TEST: CPT

## 2024-08-31 PROCEDURE — 2700000000 HC OXYGEN THERAPY PER DAY

## 2024-08-31 PROCEDURE — 94640 AIRWAY INHALATION TREATMENT: CPT

## 2024-08-31 PROCEDURE — 6370000000 HC RX 637 (ALT 250 FOR IP): Performed by: INTERNAL MEDICINE

## 2024-08-31 RX ORDER — DOXYCYCLINE 100 MG/1
100 CAPSULE ORAL EVERY 12 HOURS SCHEDULED
Qty: 6 CAPSULE | Refills: 0 | Status: ON HOLD | OUTPATIENT
Start: 2024-08-31 | End: 2024-09-03

## 2024-08-31 RX ORDER — CEFDINIR 300 MG/1
300 CAPSULE ORAL DAILY
Qty: 5 CAPSULE | Refills: 0 | Status: ON HOLD | OUTPATIENT
Start: 2024-08-31 | End: 2024-09-05

## 2024-08-31 RX ADMIN — INSULIN LISPRO 2 UNITS: 100 INJECTION, SOLUTION INTRAVENOUS; SUBCUTANEOUS at 15:50

## 2024-08-31 RX ADMIN — DOXYCYCLINE HYCLATE 100 MG: 100 CAPSULE ORAL at 13:04

## 2024-08-31 RX ADMIN — WATER 1000 MG: 1 INJECTION INTRAMUSCULAR; INTRAVENOUS; SUBCUTANEOUS at 13:05

## 2024-08-31 RX ADMIN — HEPARIN SODIUM 5000 UNITS: 5000 INJECTION INTRAVENOUS; SUBCUTANEOUS at 15:50

## 2024-08-31 RX ADMIN — PANTOPRAZOLE SODIUM 40 MG: 40 TABLET, DELAYED RELEASE ORAL at 13:05

## 2024-08-31 RX ADMIN — HEPARIN SODIUM 5000 UNITS: 5000 INJECTION INTRAVENOUS; SUBCUTANEOUS at 05:02

## 2024-08-31 RX ADMIN — BUPROPION HYDROCHLORIDE 300 MG: 150 TABLET, EXTENDED RELEASE ORAL at 13:04

## 2024-08-31 RX ADMIN — EZETIMIBE 10 MG: 10 TABLET ORAL at 13:03

## 2024-08-31 RX ADMIN — METOPROLOL SUCCINATE 50 MG: 50 TABLET, EXTENDED RELEASE ORAL at 13:04

## 2024-08-31 RX ADMIN — ALOGLIPTIN 6.25 MG: 6.25 TABLET, FILM COATED ORAL at 13:04

## 2024-08-31 RX ADMIN — ACETAMINOPHEN 500 MG: 500 TABLET ORAL at 06:04

## 2024-08-31 RX ADMIN — Medication 1 TABLET: at 13:03

## 2024-08-31 RX ADMIN — ARFORMOTEROL TARTRATE: 15 SOLUTION RESPIRATORY (INHALATION) at 06:49

## 2024-08-31 ASSESSMENT — PAIN SCALES - GENERAL
PAINLEVEL_OUTOF10: 0
PAINLEVEL_OUTOF10: 2
PAINLEVEL_OUTOF10: 0
PAINLEVEL_OUTOF10: 0

## 2024-08-31 ASSESSMENT — PAIN DESCRIPTION - LOCATION: LOCATION: NECK

## 2024-08-31 ASSESSMENT — PAIN DESCRIPTION - DESCRIPTORS: DESCRIPTORS: ACHING;SORE

## 2024-08-31 ASSESSMENT — PAIN DESCRIPTION - ORIENTATION: ORIENTATION: RIGHT

## 2024-08-31 NOTE — PLAN OF CARE
Problem: Discharge Planning  Goal: Discharge to home or other facility with appropriate resources  Outcome: Progressing     Problem: Pain  Goal: Verbalizes/displays adequate comfort level or baseline comfort level  Outcome: Progressing     Problem: Safety - Adult  Goal: Free from fall injury  Outcome: Progressing     Problem: Risk for Elopement  Goal: Patient will not exit the unit/facility without proper excort  Outcome: Progressing     Problem: Nutrition Deficit:  Goal: Optimize nutritional status  8/31/2024 0105 by Chandan Mcdonald RN  Outcome: Progressing

## 2024-08-31 NOTE — PROGRESS NOTES
Department of Internal Medicine        CHIEF COMPLAINT:  + SOB with exertion, intermittent upper abdominal pain    Reason for Admission: Acute renal failure, CHF    HISTORY OF PRESENT ILLNESS:      The patient is a 74 y.o. male who presents with increased shortness of breath has been going on for about a month.  Patient was seen in the ED 2 days ago with the same symptoms of shortness of breath when he lays down and when he ambulates.  Patient said the symptoms going on for about a month.  He denies any chest pain or palpitations or dizziness.  He denies any fever/chills or productive cough.  He denies any history of heart problems.  Patient's BUN/creatinine and was elevated at 30/4.4 with.  No history of kidney disease in the past.  His proBNP was greater than 70,000.  Troponin was 81.  CBC was normal.  Patient random blood sugar though was also 253.  Patient signed out AMA but came back into the hospital because his [facility] physician/nurse practitioner and told to come back in.  Patient states symptoms about the same with a WBC 6.3 and hemoglobin 13.0 today.  Pending repeat BMP today.  Temperature was 98 with a heart rate of 110 and blood pressure 94/78.  O2 sat 98% on room air at rest.  Chest x-ray was ordered and pending.  The patient did have a CTA of the lungs that on the 17th which showed no evidence of PE but did show bilateral pleural effusion.    8/20/2024  Patient seen examined on PCU.  Patient states he feels a lot better today.  Patient denies any current chest pain, abdominal pain, nausea or vomiting or unusual shortness of breath.  BUN/creatinine was 29/4.3.  Blood sugars range .  Hemoglobin A1c 7.4.  WBC 5.7 with hemoglobin 11.7.  Temperature 98.3 with heart rate 84 blood pressure 136/65.  O2 sat 94% on room air at rest.  CT abdomen pelvis yesterday showed no acute renal obstruction.  Possible constipation with moderate bladder distention.  Bladder scan today.  Pending echocardiogram  gram-positive organisms  History of hyperlipidemia  History of hypertension with hypotension on admission-94/78-resolved  History of depression  History of coronary artery disease  History of peripheral vascular disease  Vitamin D deficiency    Plan:  Patient admitted to monitored bed  Home medication reviewed  Activity up ad isrrael.  Hemoglobin A1c  Heparin 5000 units subcu every 8  Compazine 5 mg IV push every 6 hours as needed for nausea  Glucoscans 4 times daily with sliding scale insulin  General Diet with salt restriction  Accurate I's and O's  Echocardiogram  DuoNeb aerosols 4 times daily  Pulmicort aerosol twice daily  CT of the abdomen pelvis with oral contrast  Spot urine sodium  Spot urine creatinine  Urine for eosinophils    Bladder scan-84 cc on 8/20    Cardiology, nephrology consulted  Urine culture    Doxycycline 100 mg twice daily p.o. x 5 days    Patient  left AMA 8/24 about 5 AM nurses on the floor looked for the patient and he left the hospital.  They called his cell phone and he says I ran away to home.    Patient came back to ED 8/24 about 7 AM because of shortness of breath  IV fluids were stopped  Lasix 60 mg IV push x 1  Procalcitonin    Rocephin 1 g IV piggyback daily  Doxycycline 100 mg p.o. twice daily  Procalcitonin in a.m.  Lasix 40 mg IV push x 1 on 8/25    Procalcitonin 8/27    Chest x-ray PA and lateral on 8/27  Stop IV fluids when okay with nephrology    IR for dialysis catheter insertion 8/29  Hemodialysis on 8/29, 8/30, 8/31    O2 saturation on room air at rest and if greater than 89% with activity-consult  if O2 sat is less than 90% with activity.    CMP, CBC in a.m.      Mauro Du DO, D.O.  8/31/2024  11:51 AM

## 2024-08-31 NOTE — PROGRESS NOTES
Pulse oximetry assessment   93% at rest on room air (if 88% or less, skip next steps)  90% while ambulating on room air

## 2024-08-31 NOTE — PROGRESS NOTES
Nephrology Progress Note  8/31/2024 12:09 PM  Subjective:   Admit Date: 8/24/2024  PCP: Dean Alanis APRN - CNP    Interval History: Patient was seen on hemodialysis.  Case was discussed with the inpatient hemodialysis nurse and the hemodialysis suite at bedside.     Seen on hemodialysis  Procedure: Hemodialysis  Indication: Nonrecovery of acute kidney injury on top of stage IV chronic kidney disease likely reflecting end-stage kidney disease  For: Diffuse of dialysis and ultrafiltration  By: CIPRIANO John  Patient is tolerating the procedure and so far there are no complications    Patient told me that he continues to feel better since he started dialysis.  He is aware of his outpatient scheduled hemodialysis treatments.  Not short of breath on oxygen.  Today we discussed quitting smoking upon discharge back home.  He expressed understanding.    Not in pain.  Good appetite.    Diet: ADULT DIET; Regular; 4 carb choices (60 gm/meal)  ADULT ORAL NUTRITION SUPPLEMENT; Breakfast, Lunch, Dinner; Standard High Calorie/High Protein Oral Supplement    Data:     Scheduled Meds:   cefTRIAXone (ROCEPHIN) IV  1,000 mg IntraVENous Q24H    doxycycline hyclate  100 mg Oral 2 times per day    nicotine  1 patch TransDERmal Daily    atorvastatin  80 mg Oral Nightly    [Held by provider] aspirin  81 mg Oral Daily    buPROPion  300 mg Oral QAM    ezetimibe  10 mg Oral Daily    pantoprazole  40 mg Oral Daily    [Held by provider] isosorbide mononitrate  30 mg Oral Daily    metoprolol succinate  50 mg Oral Daily    therapeutic multivitamin-minerals  1 tablet Oral Daily    arformoterol 15 mcg-budesonide 0.5 mg neb solution   Nebulization BID RT    insulin lispro  0-8 Units SubCUTAneous TID WC    insulin lispro  0-4 Units SubCUTAneous Nightly    heparin (porcine)  5,000 Units SubCUTAneous 3 times per day    alogliptin  6.25 mg Oral Daily     Continuous Infusions:   dextrose       PRN Meds:haloperidol lactate, glucose, dextrose bolus **OR**

## 2024-08-31 NOTE — FLOWSHEET NOTE
08/31/24 1235   Vital Signs   /68   Temp 98.3 °F (36.8 °C)   Pulse 68   Respirations 16   Weight - Scale 61.6 kg (135 lb 12.9 oz)   Weight Method Bed scale   Percent Weight Change -2.38   Pain Assessment   Pain Assessment None - Denies Pain   Post-Hemodialysis Assessment   Post-Treatment Procedures Blood returned;Catheter capped, clamped and heparinized x 2 ports   Machine Disinfection Process Exterior Machine Disinfection   Rinseback Volume (ml) 300 ml   Blood Volume Processed (Liters) 90.2 L   Dialyzer Clearance Lightly streaked   Duration of Treatment (minutes) 240 minutes   Hemodialysis Intake (ml) 300 ml   Hemodialysis Output (ml) 1800 ml   NET Removed (ml) 1500   Tolerated Treatment Good   Bilateral Breath Sounds Diminished   Edema None   Time Off 1220   Patient Disposition Return to room   Observations & Evaluations   Level of Consciousness 0   Oriented X 3   Heart Rhythm Regular   Respiratory Quality/Effort Unlabored   O2 Device Nasal cannula   Skin Color Pale   Skin Condition/Temp Dry;Warm   Abdomen Inspection Soft   Comments tolerated well, net UF 1.5 L   Access   Add Access? Yes  (R chest TDC)

## 2024-09-01 ENCOUNTER — HOSPITAL ENCOUNTER (INPATIENT)
Age: 74
LOS: 2 days | Discharge: HOME | DRG: 280 | End: 2024-09-03
Attending: EMERGENCY MEDICINE | Admitting: INTERNAL MEDICINE
Payer: OTHER GOVERNMENT

## 2024-09-01 ENCOUNTER — APPOINTMENT (OUTPATIENT)
Dept: GENERAL RADIOLOGY | Age: 74
DRG: 280 | End: 2024-09-01
Payer: OTHER GOVERNMENT

## 2024-09-01 DIAGNOSIS — R79.89 ELEVATED TROPONIN: ICD-10-CM

## 2024-09-01 DIAGNOSIS — I21.4 NSTEMI (NON-ST ELEVATED MYOCARDIAL INFARCTION) (HCC): ICD-10-CM

## 2024-09-01 DIAGNOSIS — R06.02 SHORTNESS OF BREATH: Primary | ICD-10-CM

## 2024-09-01 LAB
ANION GAP SERPL CALCULATED.3IONS-SCNC: 7 MMOL/L (ref 7–16)
B.E.: 2.5 MMOL/L (ref -3–3)
BASOPHILS # BLD: 0.04 K/UL (ref 0–0.2)
BASOPHILS NFR BLD: 1 % (ref 0–2)
BNP SERPL-MCNC: ABNORMAL PG/ML (ref 0–450)
BUN SERPL-MCNC: 15 MG/DL (ref 6–23)
CALCIUM SERPL-MCNC: 8.5 MG/DL (ref 8.6–10.2)
CHLORIDE SERPL-SCNC: 104 MMOL/L (ref 98–107)
CO2 SERPL-SCNC: 32 MMOL/L (ref 22–29)
COHB: 0.8 % (ref 0–1.5)
CREAT SERPL-MCNC: 2.9 MG/DL (ref 0.7–1.2)
CRITICAL: ABNORMAL
DATE ANALYZED: ABNORMAL
DATE OF COLLECTION: ABNORMAL
EKG ATRIAL RATE: 85 BPM
EKG P AXIS: 43 DEGREES
EKG P-R INTERVAL: 150 MS
EKG Q-T INTERVAL: 402 MS
EKG QRS DURATION: 114 MS
EKG QTC CALCULATION (BAZETT): 478 MS
EKG R AXIS: -16 DEGREES
EKG T AXIS: 138 DEGREES
EKG VENTRICULAR RATE: 85 BPM
EOSINOPHIL # BLD: 0.14 K/UL (ref 0.05–0.5)
EOSINOPHILS RELATIVE PERCENT: 2 % (ref 0–6)
ERYTHROCYTE [DISTWIDTH] IN BLOOD BY AUTOMATED COUNT: 13.8 % (ref 11.5–15)
ERYTHROCYTE [DISTWIDTH] IN BLOOD BY AUTOMATED COUNT: 13.9 % (ref 11.5–15)
GFR, ESTIMATED: 22 ML/MIN/1.73M2
GLUCOSE SERPL-MCNC: 149 MG/DL (ref 74–99)
HCO3: 27.3 MMOL/L (ref 22–26)
HCT VFR BLD AUTO: 38.6 % (ref 37–54)
HCT VFR BLD AUTO: 39.4 % (ref 37–54)
HGB BLD-MCNC: 11.9 G/DL (ref 12.5–16.5)
HGB BLD-MCNC: 12.1 G/DL (ref 12.5–16.5)
HHB: 5.4 % (ref 0–5)
IMM GRANULOCYTES # BLD AUTO: 0.03 K/UL (ref 0–0.58)
IMM GRANULOCYTES NFR BLD: 0 % (ref 0–5)
LAB: ABNORMAL
LYMPHOCYTES NFR BLD: 0.61 K/UL (ref 1.5–4)
LYMPHOCYTES RELATIVE PERCENT: 9 % (ref 20–42)
Lab: 746
MAGNESIUM SERPL-MCNC: 1.8 MG/DL (ref 1.6–2.6)
MCH RBC QN AUTO: 29.2 PG (ref 26–35)
MCH RBC QN AUTO: 29.2 PG (ref 26–35)
MCHC RBC AUTO-ENTMCNC: 30.7 G/DL (ref 32–34.5)
MCHC RBC AUTO-ENTMCNC: 30.8 G/DL (ref 32–34.5)
MCV RBC AUTO: 94.8 FL (ref 80–99.9)
MCV RBC AUTO: 94.9 FL (ref 80–99.9)
METHB: 0.3 % (ref 0–1.5)
MODE: ABNORMAL
MONOCYTES NFR BLD: 0.6 K/UL (ref 0.1–0.95)
MONOCYTES NFR BLD: 9 % (ref 2–12)
NEUTROPHILS NFR BLD: 80 % (ref 43–80)
NEUTS SEG NFR BLD: 5.68 K/UL (ref 1.8–7.3)
O2 CONTENT: 16.2 ML/DL
O2 SATURATION: 94.5 % (ref 92–98.5)
O2HB: 93.5 % (ref 94–97)
OPERATOR ID: 274
PARTIAL THROMBOPLASTIN TIME: 29.8 SEC (ref 24.5–35.1)
PARTIAL THROMBOPLASTIN TIME: >240 SEC (ref 24.5–35.1)
PATIENT TEMP: 37 C
PCO2: 42.7 MMHG (ref 35–45)
PH BLOOD GAS: 7.42 (ref 7.35–7.45)
PLATELET # BLD AUTO: 228 K/UL (ref 130–450)
PLATELET # BLD AUTO: 234 K/UL (ref 130–450)
PMV BLD AUTO: 9.2 FL (ref 7–12)
PMV BLD AUTO: 9.6 FL (ref 7–12)
PO2: 71.6 MMHG (ref 75–100)
POTASSIUM SERPL-SCNC: 4.5 MMOL/L (ref 3.5–5)
RBC # BLD AUTO: 4.07 M/UL (ref 3.8–5.8)
RBC # BLD AUTO: 4.15 M/UL (ref 3.8–5.8)
SODIUM SERPL-SCNC: 143 MMOL/L (ref 132–146)
SOURCE, BLOOD GAS: ABNORMAL
THB: 12.3 G/DL (ref 11.5–16.5)
TIME ANALYZED: 753
TROPONIN I SERPL HS-MCNC: 351 NG/L (ref 0–11)
TROPONIN I SERPL HS-MCNC: 360 NG/L (ref 0–11)
WBC OTHER # BLD: 6.5 K/UL (ref 4.5–11.5)
WBC OTHER # BLD: 7.1 K/UL (ref 4.5–11.5)

## 2024-09-01 PROCEDURE — 6370000000 HC RX 637 (ALT 250 FOR IP)

## 2024-09-01 PROCEDURE — 83880 ASSAY OF NATRIURETIC PEPTIDE: CPT

## 2024-09-01 PROCEDURE — 71045 X-RAY EXAM CHEST 1 VIEW: CPT

## 2024-09-01 PROCEDURE — 82805 BLOOD GASES W/O2 SATURATION: CPT

## 2024-09-01 PROCEDURE — 94640 AIRWAY INHALATION TREATMENT: CPT

## 2024-09-01 PROCEDURE — 84484 ASSAY OF TROPONIN QUANT: CPT

## 2024-09-01 PROCEDURE — 85025 COMPLETE CBC W/AUTO DIFF WBC: CPT

## 2024-09-01 PROCEDURE — 99285 EMERGENCY DEPT VISIT HI MDM: CPT

## 2024-09-01 PROCEDURE — 85027 COMPLETE CBC AUTOMATED: CPT

## 2024-09-01 PROCEDURE — 6370000000 HC RX 637 (ALT 250 FOR IP): Performed by: INTERNAL MEDICINE

## 2024-09-01 PROCEDURE — 36415 COLL VENOUS BLD VENIPUNCTURE: CPT

## 2024-09-01 PROCEDURE — 85730 THROMBOPLASTIN TIME PARTIAL: CPT

## 2024-09-01 PROCEDURE — 93010 ELECTROCARDIOGRAM REPORT: CPT | Performed by: INTERNAL MEDICINE

## 2024-09-01 PROCEDURE — 83735 ASSAY OF MAGNESIUM: CPT

## 2024-09-01 PROCEDURE — 93005 ELECTROCARDIOGRAM TRACING: CPT

## 2024-09-01 PROCEDURE — 6360000002 HC RX W HCPCS

## 2024-09-01 PROCEDURE — 96375 TX/PRO/DX INJ NEW DRUG ADDON: CPT

## 2024-09-01 PROCEDURE — 2060000000 HC ICU INTERMEDIATE R&B

## 2024-09-01 PROCEDURE — 96365 THER/PROPH/DIAG IV INF INIT: CPT

## 2024-09-01 PROCEDURE — 80048 BASIC METABOLIC PNL TOTAL CA: CPT

## 2024-09-01 RX ORDER — NICOTINE 21 MG/24HR
1 PATCH, TRANSDERMAL 24 HOURS TRANSDERMAL DAILY
Status: DISCONTINUED | OUTPATIENT
Start: 2024-09-01 | End: 2024-09-03 | Stop reason: HOSPADM

## 2024-09-01 RX ORDER — HEPARIN SODIUM 10000 [USP'U]/100ML
5-30 INJECTION, SOLUTION INTRAVENOUS CONTINUOUS
Status: DISCONTINUED | OUTPATIENT
Start: 2024-09-01 | End: 2024-09-03 | Stop reason: HOSPADM

## 2024-09-01 RX ORDER — HEPARIN SODIUM 10000 [USP'U]/100ML
5-30 INJECTION, SOLUTION INTRAVENOUS CONTINUOUS
Status: DISCONTINUED | OUTPATIENT
Start: 2024-09-01 | End: 2024-09-01

## 2024-09-01 RX ORDER — HEPARIN SODIUM 1000 [USP'U]/ML
30 INJECTION, SOLUTION INTRAVENOUS; SUBCUTANEOUS PRN
Status: DISCONTINUED | OUTPATIENT
Start: 2024-09-01 | End: 2024-09-03 | Stop reason: HOSPADM

## 2024-09-01 RX ORDER — HEPARIN SODIUM 1000 [USP'U]/ML
60 INJECTION, SOLUTION INTRAVENOUS; SUBCUTANEOUS ONCE
Status: COMPLETED | OUTPATIENT
Start: 2024-09-01 | End: 2024-09-01

## 2024-09-01 RX ORDER — HEPARIN SODIUM 1000 [USP'U]/ML
60 INJECTION, SOLUTION INTRAVENOUS; SUBCUTANEOUS PRN
Status: DISCONTINUED | OUTPATIENT
Start: 2024-09-01 | End: 2024-09-03 | Stop reason: HOSPADM

## 2024-09-01 RX ORDER — ASPIRIN 81 MG/1
324 TABLET, CHEWABLE ORAL ONCE
Status: COMPLETED | OUTPATIENT
Start: 2024-09-01 | End: 2024-09-01

## 2024-09-01 RX ORDER — MAGNESIUM SULFATE IN WATER 40 MG/ML
2000 INJECTION, SOLUTION INTRAVENOUS ONCE
Status: COMPLETED | OUTPATIENT
Start: 2024-09-01 | End: 2024-09-01

## 2024-09-01 RX ORDER — IPRATROPIUM BROMIDE AND ALBUTEROL SULFATE 2.5; .5 MG/3ML; MG/3ML
3 SOLUTION RESPIRATORY (INHALATION) ONCE
Status: COMPLETED | OUTPATIENT
Start: 2024-09-01 | End: 2024-09-01

## 2024-09-01 RX ADMIN — IPRATROPIUM BROMIDE AND ALBUTEROL SULFATE 3 DOSE: 2.5; .5 SOLUTION RESPIRATORY (INHALATION) at 07:26

## 2024-09-01 RX ADMIN — HEPARIN SODIUM AND DEXTROSE 12 UNITS/KG/HR: 10000; 5 INJECTION INTRAVENOUS at 11:28

## 2024-09-01 RX ADMIN — ASPIRIN 324 MG: 81 TABLET, CHEWABLE ORAL at 11:29

## 2024-09-01 RX ADMIN — HEPARIN SODIUM 3800 UNITS: 1000 INJECTION INTRAVENOUS; SUBCUTANEOUS at 11:24

## 2024-09-01 RX ADMIN — MAGNESIUM SULFATE HEPTAHYDRATE 2000 MG: 40 INJECTION, SOLUTION INTRAVENOUS at 07:53

## 2024-09-01 ASSESSMENT — PAIN DESCRIPTION - LOCATION: LOCATION: CHEST

## 2024-09-01 ASSESSMENT — PAIN SCALES - GENERAL: PAINLEVEL_OUTOF10: 2

## 2024-09-01 ASSESSMENT — PAIN DESCRIPTION - DESCRIPTORS: DESCRIPTORS: ACHING

## 2024-09-01 ASSESSMENT — PAIN - FUNCTIONAL ASSESSMENT: PAIN_FUNCTIONAL_ASSESSMENT: PREVENTS OR INTERFERES SOME ACTIVE ACTIVITIES AND ADLS

## 2024-09-01 NOTE — ED NOTES
This nurse attempted to call report, placed on hold. Prior RN Lalitha also attempted to call report and also placed on hold.

## 2024-09-01 NOTE — H&P
BMI 21.29 kg/m²     General:  This is a 74 y.o. yo male who is alert and oriented in mild distress  HEENT:  Head is normocephalic and atraumatic, PERRLA, EOMI, mucus membranes moist with no pharyngeal erythema or exudate.  Neck:  Supple with no carotid bruits, JVD or thyromegaly.  No cervical adenopathy  CV:  Regular rate and rhythm, no murmurs  Lungs:  Coarse decrease breath sounds to auscultation bilaterally with no wheezes, rales or rhonchi  Abdomen:  Soft, nontender, nondistended, bowel sounds present  Extremities:  No edema, peripheral pulses intact bilaterally  Neuro:  Cranial nerves II-XII grossly intact; motor and sensory function intact with no focal deficits  Skin:  No rashes, lesions or wounds    DATA:  CBC with Differential:    Lab Results   Component Value Date/Time    WBC 6.5 09/01/2024 11:18 AM    RBC 4.07 09/01/2024 11:18 AM    HGB 11.9 09/01/2024 11:18 AM    HCT 38.6 09/01/2024 11:18 AM     09/01/2024 11:18 AM    MCV 94.8 09/01/2024 11:18 AM    MCH 29.2 09/01/2024 11:18 AM    MCHC 30.8 09/01/2024 11:18 AM    RDW 13.8 09/01/2024 11:18 AM    LYMPHOPCT 9 09/01/2024 07:37 AM    MONOPCT 9 09/01/2024 07:37 AM    EOSPCT 2 09/01/2024 07:37 AM    BASOPCT 1 09/01/2024 07:37 AM    MONOSABS 0.60 09/01/2024 07:37 AM    LYMPHSABS 0.61 09/01/2024 07:37 AM    EOSABS 0.14 09/01/2024 07:37 AM    BASOSABS 0.04 09/01/2024 07:37 AM     CMP:    Lab Results   Component Value Date/Time     09/01/2024 07:37 AM    K 4.5 09/01/2024 07:37 AM     09/01/2024 07:37 AM    CO2 32 09/01/2024 07:37 AM    BUN 15 09/01/2024 07:37 AM    CREATININE 2.9 09/01/2024 07:37 AM    LABGLOM 22 09/01/2024 07:37 AM    GLUCOSE 149 09/01/2024 07:37 AM    CALCIUM 8.5 09/01/2024 07:37 AM    BILITOT 0.2 08/31/2024 08:25 AM    ALKPHOS 91 08/31/2024 08:25 AM    AST 36 08/31/2024 08:25 AM    ALT 25 08/31/2024 08:25 AM     Magnesium:    Lab Results   Component Value Date/Time    MG 1.8 09/01/2024 07:37 AM     Phosphorus:    Lab

## 2024-09-01 NOTE — ED PROVIDER NOTES
normal white blood cell count and he is afebrile.  ABG showed slight hypoxemia but no hypercapnia.  He had symptomatic improvement after receiving DuoNeb treatments and magnesium.  He was no longer short of breath.  The patient had elevated troponins from his baseline in the 300s although downtrending of still above his baseline.  He is given full dose aspirin and started on low heparin drip for concerns of NSTEMI due to elevated troponins from his baseline.  Spoke with Dr. Frazier of cardiology who agrees the patient can be admitted and will see him as a consult  Discussed with Dr. Du who agreed to admit for cardiology consult    Other demential diagnosis included pneumothorax, PE,, rib fracture      ED Course as of 09/01/24 1613   Sun Sep 01, 2024   0817 Upon reevaluation the patient is getting DuoNeb treatments and received magnesium.  His lung sounds are more open which shows crackles on the bilateral lower lobe base.  He states that he is feeling better symptomatically. [MN]   0901 Troponin, High Sensitivity(!): 360  Eleavted than prior, getting repeat trop, pt feels better after treatement, does not have, he denies chest pain  [MN]   1015 Upon reevaluation the patient is on nasal cannula.  He reports better improvement symptoms he says his shortness of breath is very slight now.  Still remains chest pain-free at this time. [MN]      ED Course User Index  [MN] Anamika Thornton, DO        ED Course as of 09/01/24 1613   Sun Sep 01, 2024   0817 Upon reevaluation the patient is getting DuoNeb treatments and received magnesium.  His lung sounds are more open which shows crackles on the bilateral lower lobe base.  He states that he is feeling better symptomatically. [MN]   0901 Troponin, High Sensitivity(!): 360  Eleavted than prior, getting repeat trop, pt feels better after treatement, does not have, he denies chest pain  [MN]   1015 Upon reevaluation the patient is on nasal cannula.  He reports better

## 2024-09-02 LAB
ALBUMIN SERPL-MCNC: 2.5 G/DL (ref 3.5–5.2)
ALP SERPL-CCNC: 109 U/L (ref 40–129)
ALT SERPL-CCNC: 34 U/L (ref 0–40)
ANION GAP SERPL CALCULATED.3IONS-SCNC: 6 MMOL/L (ref 7–16)
AST SERPL-CCNC: 48 U/L (ref 0–39)
BASOPHILS # BLD: 0.05 K/UL (ref 0–0.2)
BASOPHILS NFR BLD: 1 % (ref 0–2)
BILIRUB SERPL-MCNC: 0.2 MG/DL (ref 0–1.2)
BUN SERPL-MCNC: 15 MG/DL (ref 6–23)
CALCIUM SERPL-MCNC: 8.5 MG/DL (ref 8.6–10.2)
CHLORIDE SERPL-SCNC: 103 MMOL/L (ref 98–107)
CO2 SERPL-SCNC: 30 MMOL/L (ref 22–29)
CREAT SERPL-MCNC: 3.4 MG/DL (ref 0.7–1.2)
EOSINOPHIL # BLD: 0.25 K/UL (ref 0.05–0.5)
EOSINOPHILS RELATIVE PERCENT: 4 % (ref 0–6)
ERYTHROCYTE [DISTWIDTH] IN BLOOD BY AUTOMATED COUNT: 14 % (ref 11.5–15)
GFR, ESTIMATED: 18 ML/MIN/1.73M2
GLUCOSE SERPL-MCNC: 121 MG/DL (ref 74–99)
HCT VFR BLD AUTO: 37.1 % (ref 37–54)
HGB BLD-MCNC: 11.6 G/DL (ref 12.5–16.5)
IMM GRANULOCYTES # BLD AUTO: <0.03 K/UL (ref 0–0.58)
IMM GRANULOCYTES NFR BLD: 0 % (ref 0–5)
LYMPHOCYTES NFR BLD: 1.04 K/UL (ref 1.5–4)
LYMPHOCYTES RELATIVE PERCENT: 16 % (ref 20–42)
MAGNESIUM SERPL-MCNC: 2.2 MG/DL (ref 1.6–2.6)
MCH RBC QN AUTO: 29.6 PG (ref 26–35)
MCHC RBC AUTO-ENTMCNC: 31.3 G/DL (ref 32–34.5)
MCV RBC AUTO: 94.6 FL (ref 80–99.9)
MONOCYTES NFR BLD: 0.56 K/UL (ref 0.1–0.95)
MONOCYTES NFR BLD: 9 % (ref 2–12)
NEUTROPHILS NFR BLD: 70 % (ref 43–80)
NEUTS SEG NFR BLD: 4.55 K/UL (ref 1.8–7.3)
PARTIAL THROMBOPLASTIN TIME: 123.4 SEC (ref 24.5–35.1)
PARTIAL THROMBOPLASTIN TIME: 177.1 SEC (ref 24.5–35.1)
PARTIAL THROMBOPLASTIN TIME: 56.2 SEC (ref 24.5–35.1)
PARTIAL THROMBOPLASTIN TIME: 67.6 SEC (ref 24.5–35.1)
PHOSPHATE SERPL-MCNC: 3.3 MG/DL (ref 2.5–4.5)
PLATELET # BLD AUTO: 237 K/UL (ref 130–450)
PMV BLD AUTO: 10.9 FL (ref 7–12)
POTASSIUM SERPL-SCNC: 4 MMOL/L (ref 3.5–5)
PROCALCITONIN SERPL-MCNC: 0.15 NG/ML (ref 0–0.08)
PROT SERPL-MCNC: 5.2 G/DL (ref 6.4–8.3)
RBC # BLD AUTO: 3.92 M/UL (ref 3.8–5.8)
SODIUM SERPL-SCNC: 139 MMOL/L (ref 132–146)
TROPONIN I SERPL HS-MCNC: 349 NG/L (ref 0–11)
WBC OTHER # BLD: 6.5 K/UL (ref 4.5–11.5)

## 2024-09-02 PROCEDURE — 84145 PROCALCITONIN (PCT): CPT

## 2024-09-02 PROCEDURE — 84100 ASSAY OF PHOSPHORUS: CPT

## 2024-09-02 PROCEDURE — 5A1D70Z PERFORMANCE OF URINARY FILTRATION, INTERMITTENT, LESS THAN 6 HOURS PER DAY: ICD-10-PCS | Performed by: INTERNAL MEDICINE

## 2024-09-02 PROCEDURE — 85730 THROMBOPLASTIN TIME PARTIAL: CPT

## 2024-09-02 PROCEDURE — 2700000000 HC OXYGEN THERAPY PER DAY

## 2024-09-02 PROCEDURE — 84484 ASSAY OF TROPONIN QUANT: CPT

## 2024-09-02 PROCEDURE — 6360000002 HC RX W HCPCS

## 2024-09-02 PROCEDURE — 99223 1ST HOSP IP/OBS HIGH 75: CPT | Performed by: INTERNAL MEDICINE

## 2024-09-02 PROCEDURE — 6360000002 HC RX W HCPCS: Performed by: INTERNAL MEDICINE

## 2024-09-02 PROCEDURE — 80053 COMPREHEN METABOLIC PANEL: CPT

## 2024-09-02 PROCEDURE — 36415 COLL VENOUS BLD VENIPUNCTURE: CPT

## 2024-09-02 PROCEDURE — 94640 AIRWAY INHALATION TREATMENT: CPT

## 2024-09-02 PROCEDURE — 6370000000 HC RX 637 (ALT 250 FOR IP): Performed by: INTERNAL MEDICINE

## 2024-09-02 PROCEDURE — 2060000000 HC ICU INTERMEDIATE R&B

## 2024-09-02 PROCEDURE — 90935 HEMODIALYSIS ONE EVALUATION: CPT

## 2024-09-02 PROCEDURE — 85025 COMPLETE CBC W/AUTO DIFF WBC: CPT

## 2024-09-02 PROCEDURE — 83735 ASSAY OF MAGNESIUM: CPT

## 2024-09-02 RX ORDER — ACETAMINOPHEN 500 MG
500 TABLET ORAL EVERY 6 HOURS PRN
Status: DISCONTINUED | OUTPATIENT
Start: 2024-09-02 | End: 2024-09-03 | Stop reason: HOSPADM

## 2024-09-02 RX ORDER — PROCHLORPERAZINE EDISYLATE 5 MG/ML
5 INJECTION INTRAMUSCULAR; INTRAVENOUS EVERY 6 HOURS PRN
Status: DISCONTINUED | OUTPATIENT
Start: 2024-09-02 | End: 2024-09-03 | Stop reason: HOSPADM

## 2024-09-02 RX ORDER — BUPROPION HYDROCHLORIDE 150 MG/1
300 TABLET ORAL EVERY MORNING
Status: DISCONTINUED | OUTPATIENT
Start: 2024-09-02 | End: 2024-09-03 | Stop reason: HOSPADM

## 2024-09-02 RX ORDER — PANTOPRAZOLE SODIUM 40 MG/1
40 TABLET, DELAYED RELEASE ORAL DAILY
Status: DISCONTINUED | OUTPATIENT
Start: 2024-09-02 | End: 2024-09-03 | Stop reason: HOSPADM

## 2024-09-02 RX ORDER — ALBUTEROL SULFATE 90 UG/1
2 AEROSOL, METERED RESPIRATORY (INHALATION) EVERY 4 HOURS PRN
Status: DISCONTINUED | OUTPATIENT
Start: 2024-09-02 | End: 2024-09-02 | Stop reason: CLARIF

## 2024-09-02 RX ORDER — METOPROLOL SUCCINATE 50 MG/1
50 TABLET, EXTENDED RELEASE ORAL DAILY
Status: DISCONTINUED | OUTPATIENT
Start: 2024-09-02 | End: 2024-09-03 | Stop reason: HOSPADM

## 2024-09-02 RX ORDER — EZETIMIBE 10 MG/1
10 TABLET ORAL DAILY
Status: DISCONTINUED | OUTPATIENT
Start: 2024-09-02 | End: 2024-09-03 | Stop reason: HOSPADM

## 2024-09-02 RX ORDER — ALOGLIPTIN 6.25 MG/1
6.25 TABLET, FILM COATED ORAL DAILY
Status: DISCONTINUED | OUTPATIENT
Start: 2024-09-02 | End: 2024-09-03 | Stop reason: HOSPADM

## 2024-09-02 RX ORDER — ISOSORBIDE MONONITRATE 30 MG/1
30 TABLET, EXTENDED RELEASE ORAL DAILY
Status: DISCONTINUED | OUTPATIENT
Start: 2024-09-02 | End: 2024-09-03 | Stop reason: HOSPADM

## 2024-09-02 RX ORDER — CEFDINIR 300 MG/1
300 CAPSULE ORAL DAILY
Status: DISCONTINUED | OUTPATIENT
Start: 2024-09-02 | End: 2024-09-03 | Stop reason: HOSPADM

## 2024-09-02 RX ORDER — M-VIT,TX,IRON,MINS/CALC/FOLIC 27MG-0.4MG
1 TABLET ORAL DAILY
Status: DISCONTINUED | OUTPATIENT
Start: 2024-09-02 | End: 2024-09-03 | Stop reason: HOSPADM

## 2024-09-02 RX ORDER — DOXYCYCLINE 100 MG/1
100 CAPSULE ORAL EVERY 12 HOURS SCHEDULED
Status: DISCONTINUED | OUTPATIENT
Start: 2024-09-02 | End: 2024-09-03 | Stop reason: HOSPADM

## 2024-09-02 RX ORDER — POLYETHYLENE GLYCOL 3350 17 G/17G
17 POWDER, FOR SOLUTION ORAL DAILY PRN
Status: DISCONTINUED | OUTPATIENT
Start: 2024-09-02 | End: 2024-09-03 | Stop reason: HOSPADM

## 2024-09-02 RX ORDER — ATORVASTATIN CALCIUM 40 MG/1
80 TABLET, FILM COATED ORAL NIGHTLY
Status: DISCONTINUED | OUTPATIENT
Start: 2024-09-02 | End: 2024-09-03 | Stop reason: HOSPADM

## 2024-09-02 RX ORDER — ASPIRIN 81 MG/1
81 TABLET ORAL DAILY
Status: DISCONTINUED | OUTPATIENT
Start: 2024-09-02 | End: 2024-09-03 | Stop reason: HOSPADM

## 2024-09-02 RX ORDER — ALBUTEROL SULFATE 0.83 MG/ML
2.5 SOLUTION RESPIRATORY (INHALATION) EVERY 4 HOURS PRN
Status: DISCONTINUED | OUTPATIENT
Start: 2024-09-02 | End: 2024-09-03 | Stop reason: HOSPADM

## 2024-09-02 RX ADMIN — ARFORMOTEROL TARTRATE: 15 SOLUTION RESPIRATORY (INHALATION) at 05:16

## 2024-09-02 RX ADMIN — ALOGLIPTIN 6.25 MG: 6.25 TABLET, FILM COATED ORAL at 09:09

## 2024-09-02 RX ADMIN — IPRATROPIUM BROMIDE 0.5 MG: 0.5 SOLUTION RESPIRATORY (INHALATION) at 21:36

## 2024-09-02 RX ADMIN — HEPARIN SODIUM AND DEXTROSE 7 UNITS/KG/HR: 10000; 5 INJECTION INTRAVENOUS at 21:44

## 2024-09-02 RX ADMIN — Medication 1 TABLET: at 09:08

## 2024-09-02 RX ADMIN — CEFDINIR 300 MG: 300 CAPSULE ORAL at 09:09

## 2024-09-02 RX ADMIN — ASPIRIN 81 MG: 81 TABLET, COATED ORAL at 09:09

## 2024-09-02 RX ADMIN — METOPROLOL SUCCINATE 50 MG: 50 TABLET, EXTENDED RELEASE ORAL at 09:09

## 2024-09-02 RX ADMIN — ATORVASTATIN CALCIUM 80 MG: 40 TABLET, FILM COATED ORAL at 21:27

## 2024-09-02 RX ADMIN — BUPROPION HYDROCHLORIDE 300 MG: 150 TABLET, EXTENDED RELEASE ORAL at 09:08

## 2024-09-02 RX ADMIN — DOXYCYCLINE HYCLATE 100 MG: 100 CAPSULE ORAL at 09:08

## 2024-09-02 RX ADMIN — IPRATROPIUM BROMIDE 0.5 MG: 0.5 SOLUTION RESPIRATORY (INHALATION) at 10:09

## 2024-09-02 RX ADMIN — HEPARIN SODIUM 3800 UNITS: 1000 INJECTION INTRAVENOUS; SUBCUTANEOUS at 11:52

## 2024-09-02 RX ADMIN — IPRATROPIUM BROMIDE 0.5 MG: 0.5 SOLUTION RESPIRATORY (INHALATION) at 05:16

## 2024-09-02 RX ADMIN — ISOSORBIDE MONONITRATE 30 MG: 30 TABLET, EXTENDED RELEASE ORAL at 09:08

## 2024-09-02 RX ADMIN — PANTOPRAZOLE SODIUM 40 MG: 40 TABLET, DELAYED RELEASE ORAL at 09:08

## 2024-09-02 RX ADMIN — EZETIMIBE 10 MG: 10 TABLET ORAL at 09:08

## 2024-09-02 RX ADMIN — DOXYCYCLINE HYCLATE 100 MG: 100 CAPSULE ORAL at 21:24

## 2024-09-02 ASSESSMENT — PAIN SCALES - GENERAL: PAINLEVEL_OUTOF10: 2

## 2024-09-02 ASSESSMENT — PAIN DESCRIPTION - LOCATION: LOCATION: CHEST

## 2024-09-02 ASSESSMENT — PAIN DESCRIPTION - DESCRIPTORS: DESCRIPTORS: ACHING

## 2024-09-02 NOTE — FLOWSHEET NOTE
09/02/24 1908   Vital Signs   /61   Temp 97 °F (36.1 °C)   Pulse 71   Respirations 16   Weight - Scale 63.2 kg (139 lb 5.3 oz)   Weight Method Bed scale   Percent Weight Change -1.6   Pain Assessment   Pain Assessment None - Denies Pain   Post-Hemodialysis Assessment   Post-Treatment Procedures Blood returned;Catheter capped, clamped and heparinized x 2 ports   Machine Disinfection Process Acid/Vinegar Clean;Heat Disinfect;Exterior Machine Disinfection   Rinseback Volume (ml) 300 ml   Blood Volume Processed (Liters) 89.9 L   Dialyzer Clearance Lightly streaked   Duration of Treatment (minutes) 240 minutes   Hemodialysis Intake (ml) 300 ml   Hemodialysis Output (ml) 1300 ml   NET Removed (ml) 1000   Tolerated Treatment Good   Bilateral Breath Sounds Diminished   Edema None   Time Off 1853   Patient Disposition Return to room   Observations & Evaluations   Level of Consciousness 0   Oriented X 3   Heart Rhythm Regular   Respiratory Quality/Effort Unlabored   O2 Device Nasal cannula   Skin Condition/Temp Dry;Warm   Abdomen Inspection Soft   Comments Pt tolerated well, net UF 1 L. HD catheter dressing changed.   Access   Add Access? Yes  (R chest TDC)

## 2024-09-02 NOTE — PLAN OF CARE
Problem: Discharge Planning  Goal: Discharge to home or other facility with appropriate resources  Outcome: Progressing  Flowsheets (Taken 9/1/2024 2000)  Discharge to home or other facility with appropriate resources: Identify barriers to discharge with patient and caregiver     Problem: Pain  Goal: Verbalizes/displays adequate comfort level or baseline comfort level  Outcome: Progressing  Flowsheets (Taken 9/2/2024 0100)  Verbalizes/displays adequate comfort level or baseline comfort level: Assess pain using appropriate pain scale     Problem: ABCDS Injury Assessment  Goal: Absence of physical injury  Outcome: Progressing

## 2024-09-03 ENCOUNTER — HOSPITAL ENCOUNTER (INPATIENT)
Age: 74
LOS: 1 days | Discharge: HOME HEALTH | DRG: 321 | End: 2024-09-04
Attending: INTERNAL MEDICINE | Admitting: INTERNAL MEDICINE
Payer: OTHER GOVERNMENT

## 2024-09-03 VITALS
RESPIRATION RATE: 20 BRPM | DIASTOLIC BLOOD PRESSURE: 78 MMHG | OXYGEN SATURATION: 97 % | WEIGHT: 139.33 LBS | HEART RATE: 79 BPM | BODY MASS INDEX: 21.87 KG/M2 | HEIGHT: 67 IN | TEMPERATURE: 97.4 F | SYSTOLIC BLOOD PRESSURE: 137 MMHG

## 2024-09-03 DIAGNOSIS — I21.4 NSTEMI (NON-ST ELEVATED MYOCARDIAL INFARCTION) (HCC): ICD-10-CM

## 2024-09-03 PROBLEM — I25.10 CAD (CORONARY ARTERY DISEASE): Status: ACTIVE | Noted: 2024-09-03

## 2024-09-03 LAB
ACTIVATED CLOTTING TIME, LOW RANGE: >400 SEC
ALBUMIN SERPL-MCNC: 2.2 G/DL (ref 3.5–5.2)
ALP SERPL-CCNC: 101 U/L (ref 40–129)
ALT SERPL-CCNC: 35 U/L (ref 0–40)
ANION GAP SERPL CALCULATED.3IONS-SCNC: 5 MMOL/L (ref 7–16)
AST SERPL-CCNC: 49 U/L (ref 0–39)
BASOPHILS # BLD: 0.04 K/UL (ref 0–0.2)
BASOPHILS NFR BLD: 1 % (ref 0–2)
BILIRUB SERPL-MCNC: 0.2 MG/DL (ref 0–1.2)
BUN SERPL-MCNC: 9 MG/DL (ref 6–23)
CALCIUM SERPL-MCNC: 8 MG/DL (ref 8.6–10.2)
CHLORIDE SERPL-SCNC: 102 MMOL/L (ref 98–107)
CO2 SERPL-SCNC: 31 MMOL/L (ref 22–29)
CREAT SERPL-MCNC: 2.5 MG/DL (ref 0.7–1.2)
EOSINOPHIL # BLD: 0.15 K/UL (ref 0.05–0.5)
EOSINOPHILS RELATIVE PERCENT: 2 % (ref 0–6)
ERYTHROCYTE [DISTWIDTH] IN BLOOD BY AUTOMATED COUNT: 13.7 % (ref 11.5–15)
GFR, ESTIMATED: 27 ML/MIN/1.73M2
GLUCOSE SERPL-MCNC: 104 MG/DL (ref 74–99)
HCT VFR BLD AUTO: 35 % (ref 37–54)
HGB BLD-MCNC: 11.1 G/DL (ref 12.5–16.5)
IMM GRANULOCYTES # BLD AUTO: <0.03 K/UL (ref 0–0.58)
IMM GRANULOCYTES NFR BLD: 0 % (ref 0–5)
LYMPHOCYTES NFR BLD: 0.96 K/UL (ref 1.5–4)
LYMPHOCYTES RELATIVE PERCENT: 14 % (ref 20–42)
MAGNESIUM SERPL-MCNC: 1.8 MG/DL (ref 1.6–2.6)
MCH RBC QN AUTO: 30.1 PG (ref 26–35)
MCHC RBC AUTO-ENTMCNC: 31.7 G/DL (ref 32–34.5)
MCV RBC AUTO: 94.9 FL (ref 80–99.9)
MONOCYTES NFR BLD: 0.52 K/UL (ref 0.1–0.95)
MONOCYTES NFR BLD: 8 % (ref 2–12)
NEUTROPHILS NFR BLD: 75 % (ref 43–80)
NEUTS SEG NFR BLD: 5.03 K/UL (ref 1.8–7.3)
PARTIAL THROMBOPLASTIN TIME: 63.2 SEC (ref 24.5–35.1)
PARTIAL THROMBOPLASTIN TIME: 78.2 SEC (ref 24.5–35.1)
PHOSPHATE SERPL-MCNC: 2.5 MG/DL (ref 2.5–4.5)
PLATELET # BLD AUTO: 205 K/UL (ref 130–450)
PMV BLD AUTO: 9.9 FL (ref 7–12)
POTASSIUM SERPL-SCNC: 4.5 MMOL/L (ref 3.5–5)
PROT SERPL-MCNC: 5 G/DL (ref 6.4–8.3)
RBC # BLD AUTO: 3.69 M/UL (ref 3.8–5.8)
SODIUM SERPL-SCNC: 138 MMOL/L (ref 132–146)
WBC OTHER # BLD: 6.7 K/UL (ref 4.5–11.5)

## 2024-09-03 PROCEDURE — 6370000000 HC RX 637 (ALT 250 FOR IP): Performed by: INTERNAL MEDICINE

## 2024-09-03 PROCEDURE — 93005 ELECTROCARDIOGRAM TRACING: CPT | Performed by: INTERNAL MEDICINE

## 2024-09-03 PROCEDURE — 85730 THROMBOPLASTIN TIME PARTIAL: CPT

## 2024-09-03 PROCEDURE — 2140000000 HC CCU INTERMEDIATE R&B

## 2024-09-03 PROCEDURE — 99221 1ST HOSP IP/OBS SF/LOW 40: CPT | Performed by: STUDENT IN AN ORGANIZED HEALTH CARE EDUCATION/TRAINING PROGRAM

## 2024-09-03 PROCEDURE — 85025 COMPLETE CBC W/AUTO DIFF WBC: CPT

## 2024-09-03 PROCEDURE — 6360000004 HC RX CONTRAST MEDICATION: Performed by: INTERNAL MEDICINE

## 2024-09-03 PROCEDURE — 84100 ASSAY OF PHOSPHORUS: CPT

## 2024-09-03 PROCEDURE — 94664 DEMO&/EVAL PT USE INHALER: CPT

## 2024-09-03 PROCEDURE — C1887 CATHETER, GUIDING: HCPCS | Performed by: INTERNAL MEDICINE

## 2024-09-03 PROCEDURE — 93458 L HRT ARTERY/VENTRICLE ANGIO: CPT | Performed by: INTERNAL MEDICINE

## 2024-09-03 PROCEDURE — 92928 PRQ TCAT PLMT NTRAC ST 1 LES: CPT | Performed by: INTERNAL MEDICINE

## 2024-09-03 PROCEDURE — C1874 STENT, COATED/COV W/DEL SYS: HCPCS | Performed by: INTERNAL MEDICINE

## 2024-09-03 PROCEDURE — B2151ZZ FLUOROSCOPY OF LEFT HEART USING LOW OSMOLAR CONTRAST: ICD-10-PCS | Performed by: INTERNAL MEDICINE

## 2024-09-03 PROCEDURE — 83735 ASSAY OF MAGNESIUM: CPT

## 2024-09-03 PROCEDURE — 36415 COLL VENOUS BLD VENIPUNCTURE: CPT

## 2024-09-03 PROCEDURE — 5A1D70Z PERFORMANCE OF URINARY FILTRATION, INTERMITTENT, LESS THAN 6 HOURS PER DAY: ICD-10-PCS | Performed by: INTERNAL MEDICINE

## 2024-09-03 PROCEDURE — 027034Z DILATION OF CORONARY ARTERY, ONE ARTERY WITH DRUG-ELUTING INTRALUMINAL DEVICE, PERCUTANEOUS APPROACH: ICD-10-PCS | Performed by: INTERNAL MEDICINE

## 2024-09-03 PROCEDURE — 85347 COAGULATION TIME ACTIVATED: CPT

## 2024-09-03 PROCEDURE — 6360000002 HC RX W HCPCS: Performed by: INTERNAL MEDICINE

## 2024-09-03 PROCEDURE — C1894 INTRO/SHEATH, NON-LASER: HCPCS | Performed by: INTERNAL MEDICINE

## 2024-09-03 PROCEDURE — C1725 CATH, TRANSLUMIN NON-LASER: HCPCS | Performed by: INTERNAL MEDICINE

## 2024-09-03 PROCEDURE — B2111ZZ FLUOROSCOPY OF MULTIPLE CORONARY ARTERIES USING LOW OSMOLAR CONTRAST: ICD-10-PCS | Performed by: INTERNAL MEDICINE

## 2024-09-03 PROCEDURE — 2709999900 HC NON-CHARGEABLE SUPPLY: Performed by: INTERNAL MEDICINE

## 2024-09-03 PROCEDURE — 94640 AIRWAY INHALATION TREATMENT: CPT

## 2024-09-03 PROCEDURE — 2500000003 HC RX 250 WO HCPCS: Performed by: INTERNAL MEDICINE

## 2024-09-03 PROCEDURE — 6360000002 HC RX W HCPCS

## 2024-09-03 PROCEDURE — C1769 GUIDE WIRE: HCPCS | Performed by: INTERNAL MEDICINE

## 2024-09-03 PROCEDURE — 4A023N7 MEASUREMENT OF CARDIAC SAMPLING AND PRESSURE, LEFT HEART, PERCUTANEOUS APPROACH: ICD-10-PCS | Performed by: INTERNAL MEDICINE

## 2024-09-03 PROCEDURE — 80053 COMPREHEN METABOLIC PANEL: CPT

## 2024-09-03 PROCEDURE — 2700000000 HC OXYGEN THERAPY PER DAY

## 2024-09-03 PROCEDURE — 2580000003 HC RX 258: Performed by: INTERNAL MEDICINE

## 2024-09-03 PROCEDURE — 99233 SBSQ HOSP IP/OBS HIGH 50: CPT | Performed by: INTERNAL MEDICINE

## 2024-09-03 DEVICE — STENT ONYXNG22512UX ONYX 2.25X12RX
Type: IMPLANTABLE DEVICE | Status: FUNCTIONAL
Brand: ONYX FRONTIER™

## 2024-09-03 DEVICE — STENT ONYXNG22538UX ONYX 2.25X38RX
Type: IMPLANTABLE DEVICE | Status: FUNCTIONAL
Brand: ONYX FRONTIER™

## 2024-09-03 RX ORDER — M-VIT,TX,IRON,MINS/CALC/FOLIC 27MG-0.4MG
1 TABLET ORAL DAILY
Status: DISCONTINUED | OUTPATIENT
Start: 2024-09-03 | End: 2024-09-04 | Stop reason: HOSPADM

## 2024-09-03 RX ORDER — NICOTINE 21 MG/24HR
1 PATCH, TRANSDERMAL 24 HOURS TRANSDERMAL DAILY
Status: DISCONTINUED | OUTPATIENT
Start: 2024-09-03 | End: 2024-09-04 | Stop reason: HOSPADM

## 2024-09-03 RX ORDER — ALOGLIPTIN 6.25 MG/1
6.25 TABLET, FILM COATED ORAL DAILY
Status: DISCONTINUED | OUTPATIENT
Start: 2024-09-03 | End: 2024-09-04 | Stop reason: HOSPADM

## 2024-09-03 RX ORDER — MIDAZOLAM HYDROCHLORIDE 1 MG/ML
INJECTION INTRAMUSCULAR; INTRAVENOUS PRN
Status: DISCONTINUED | OUTPATIENT
Start: 2024-09-03 | End: 2024-09-03 | Stop reason: HOSPADM

## 2024-09-03 RX ORDER — SODIUM CHLORIDE 9 MG/ML
INJECTION, SOLUTION INTRAVENOUS PRN
Status: DISCONTINUED | OUTPATIENT
Start: 2024-09-03 | End: 2024-09-04 | Stop reason: HOSPADM

## 2024-09-03 RX ORDER — BUPROPION HYDROCHLORIDE 300 MG/1
300 TABLET ORAL EVERY MORNING
Status: DISCONTINUED | OUTPATIENT
Start: 2024-09-04 | End: 2024-09-04 | Stop reason: HOSPADM

## 2024-09-03 RX ORDER — ACETAMINOPHEN 325 MG/1
650 TABLET ORAL EVERY 4 HOURS PRN
Status: DISCONTINUED | OUTPATIENT
Start: 2024-09-03 | End: 2024-09-04 | Stop reason: HOSPADM

## 2024-09-03 RX ORDER — IOPAMIDOL 755 MG/ML
INJECTION, SOLUTION INTRAVASCULAR PRN
Status: DISCONTINUED | OUTPATIENT
Start: 2024-09-03 | End: 2024-09-03 | Stop reason: HOSPADM

## 2024-09-03 RX ORDER — PANTOPRAZOLE SODIUM 40 MG/1
40 TABLET, DELAYED RELEASE ORAL DAILY
Status: DISCONTINUED | OUTPATIENT
Start: 2024-09-03 | End: 2024-09-04 | Stop reason: HOSPADM

## 2024-09-03 RX ORDER — NICOTINE 21 MG/24HR
1 PATCH, TRANSDERMAL 24 HOURS TRANSDERMAL DAILY
Qty: 30 PATCH | Refills: 3 | Status: SHIPPED | OUTPATIENT
Start: 2024-09-03

## 2024-09-03 RX ORDER — DOXYCYCLINE 100 MG/1
100 CAPSULE ORAL EVERY 12 HOURS SCHEDULED
Status: COMPLETED | OUTPATIENT
Start: 2024-09-03 | End: 2024-09-04

## 2024-09-03 RX ORDER — ATORVASTATIN CALCIUM 40 MG/1
80 TABLET, FILM COATED ORAL DAILY
Status: DISCONTINUED | OUTPATIENT
Start: 2024-09-03 | End: 2024-09-04 | Stop reason: HOSPADM

## 2024-09-03 RX ORDER — SODIUM CHLORIDE 0.9 % (FLUSH) 0.9 %
5-40 SYRINGE (ML) INJECTION PRN
Status: DISCONTINUED | OUTPATIENT
Start: 2024-09-03 | End: 2024-09-04 | Stop reason: HOSPADM

## 2024-09-03 RX ORDER — CEFDINIR 300 MG/1
300 CAPSULE ORAL DAILY
Status: DISCONTINUED | OUTPATIENT
Start: 2024-09-03 | End: 2024-09-04 | Stop reason: HOSPADM

## 2024-09-03 RX ORDER — HYDRALAZINE HYDROCHLORIDE 10 MG/1
10 TABLET, FILM COATED ORAL EVERY 8 HOURS SCHEDULED
Status: DISCONTINUED | OUTPATIENT
Start: 2024-09-03 | End: 2024-09-03 | Stop reason: HOSPADM

## 2024-09-03 RX ORDER — ALBUTEROL SULFATE 0.83 MG/ML
2.5 SOLUTION RESPIRATORY (INHALATION) EVERY 6 HOURS PRN
Status: DISCONTINUED | OUTPATIENT
Start: 2024-09-03 | End: 2024-09-04 | Stop reason: HOSPADM

## 2024-09-03 RX ORDER — ASPIRIN 81 MG/1
81 TABLET ORAL DAILY
Status: DISCONTINUED | OUTPATIENT
Start: 2024-09-03 | End: 2024-09-04 | Stop reason: HOSPADM

## 2024-09-03 RX ORDER — ASPIRIN 81 MG/1
81 TABLET, CHEWABLE ORAL ONCE
Status: COMPLETED | OUTPATIENT
Start: 2024-09-03 | End: 2024-09-03

## 2024-09-03 RX ORDER — ISOSORBIDE MONONITRATE 30 MG/1
30 TABLET, EXTENDED RELEASE ORAL DAILY
Status: DISCONTINUED | OUTPATIENT
Start: 2024-09-03 | End: 2024-09-04 | Stop reason: HOSPADM

## 2024-09-03 RX ORDER — HEPARIN SODIUM 10000 [USP'U]/ML
INJECTION, SOLUTION INTRAVENOUS; SUBCUTANEOUS PRN
Status: DISCONTINUED | OUTPATIENT
Start: 2024-09-03 | End: 2024-09-03 | Stop reason: HOSPADM

## 2024-09-03 RX ORDER — METOPROLOL SUCCINATE 25 MG/1
50 TABLET, EXTENDED RELEASE ORAL DAILY
Status: DISCONTINUED | OUTPATIENT
Start: 2024-09-03 | End: 2024-09-04 | Stop reason: HOSPADM

## 2024-09-03 RX ORDER — EZETIMIBE 10 MG/1
10 TABLET ORAL DAILY
Status: DISCONTINUED | OUTPATIENT
Start: 2024-09-03 | End: 2024-09-04 | Stop reason: HOSPADM

## 2024-09-03 RX ORDER — FENTANYL CITRATE 50 UG/ML
INJECTION, SOLUTION INTRAMUSCULAR; INTRAVENOUS PRN
Status: DISCONTINUED | OUTPATIENT
Start: 2024-09-03 | End: 2024-09-03 | Stop reason: HOSPADM

## 2024-09-03 RX ORDER — CLOPIDOGREL BISULFATE 75 MG/1
75 TABLET ORAL DAILY
Status: DISCONTINUED | OUTPATIENT
Start: 2024-09-04 | End: 2024-09-04 | Stop reason: HOSPADM

## 2024-09-03 RX ORDER — NITROGLYCERIN 0.4 MG/1
0.4 TABLET SUBLINGUAL EVERY 5 MIN PRN
Status: DISCONTINUED | OUTPATIENT
Start: 2024-09-03 | End: 2024-09-04 | Stop reason: HOSPADM

## 2024-09-03 RX ORDER — SODIUM CHLORIDE 0.9 % (FLUSH) 0.9 %
5-40 SYRINGE (ML) INJECTION EVERY 12 HOURS SCHEDULED
Status: DISCONTINUED | OUTPATIENT
Start: 2024-09-03 | End: 2024-09-04 | Stop reason: HOSPADM

## 2024-09-03 RX ADMIN — HEPARIN SODIUM AND DEXTROSE 9 UNITS/KG/HR: 10000; 5 INJECTION INTRAVENOUS at 11:55

## 2024-09-03 RX ADMIN — ACETAMINOPHEN 500 MG: 500 TABLET ORAL at 01:17

## 2024-09-03 RX ADMIN — PANTOPRAZOLE SODIUM 40 MG: 40 TABLET, DELAYED RELEASE ORAL at 08:21

## 2024-09-03 RX ADMIN — HYDRALAZINE HYDROCHLORIDE 10 MG: 10 TABLET ORAL at 12:30

## 2024-09-03 RX ADMIN — HEPARIN SODIUM 1900 UNITS: 1000 INJECTION INTRAVENOUS; SUBCUTANEOUS at 11:53

## 2024-09-03 RX ADMIN — METOPROLOL SUCCINATE 50 MG: 50 TABLET, EXTENDED RELEASE ORAL at 08:23

## 2024-09-03 RX ADMIN — ASPIRIN 81 MG: 81 TABLET, CHEWABLE ORAL at 15:54

## 2024-09-03 RX ADMIN — Medication 1 TABLET: at 08:22

## 2024-09-03 RX ADMIN — EZETIMIBE 10 MG: 10 TABLET ORAL at 08:23

## 2024-09-03 RX ADMIN — DOXYCYCLINE HYCLATE 100 MG: 100 CAPSULE ORAL at 08:22

## 2024-09-03 RX ADMIN — ARFORMOTEROL TARTRATE: 15 SOLUTION RESPIRATORY (INHALATION) at 04:43

## 2024-09-03 RX ADMIN — IPRATROPIUM BROMIDE 0.5 MG: 0.5 SOLUTION RESPIRATORY (INHALATION) at 04:43

## 2024-09-03 RX ADMIN — BUPROPION HYDROCHLORIDE 300 MG: 150 TABLET, EXTENDED RELEASE ORAL at 08:21

## 2024-09-03 RX ADMIN — ARFORMOTEROL TARTRATE: 15 SOLUTION RESPIRATORY (INHALATION) at 21:38

## 2024-09-03 RX ADMIN — ALOGLIPTIN 6.25 MG: 6.25 TABLET, FILM COATED ORAL at 19:36

## 2024-09-03 RX ADMIN — ISOSORBIDE MONONITRATE 30 MG: 30 TABLET, EXTENDED RELEASE ORAL at 08:21

## 2024-09-03 RX ADMIN — ACETAMINOPHEN 650 MG: 325 TABLET ORAL at 18:26

## 2024-09-03 RX ADMIN — ATORVASTATIN CALCIUM 80 MG: 40 TABLET, FILM COATED ORAL at 18:26

## 2024-09-03 RX ADMIN — DOXYCYCLINE HYCLATE 100 MG: 100 CAPSULE ORAL at 20:49

## 2024-09-03 RX ADMIN — SODIUM CHLORIDE, PRESERVATIVE FREE 10 ML: 5 INJECTION INTRAVENOUS at 20:49

## 2024-09-03 RX ADMIN — IPRATROPIUM BROMIDE 0.5 MG: 0.5 SOLUTION RESPIRATORY (INHALATION) at 21:38

## 2024-09-03 RX ADMIN — HEPARIN SODIUM AND DEXTROSE 7 UNITS/KG/HR: 10000; 5 INJECTION INTRAVENOUS at 07:10

## 2024-09-03 RX ADMIN — CEFDINIR 300 MG: 300 CAPSULE ORAL at 08:21

## 2024-09-03 RX ADMIN — ACETAMINOPHEN 500 MG: 500 TABLET ORAL at 07:38

## 2024-09-03 ASSESSMENT — PAIN SCALES - WONG BAKER: WONGBAKER_NUMERICALRESPONSE: NO HURT

## 2024-09-03 ASSESSMENT — PAIN SCALES - GENERAL
PAINLEVEL_OUTOF10: 3
PAINLEVEL_OUTOF10: 0
PAINLEVEL_OUTOF10: 4
PAINLEVEL_OUTOF10: 4
PAINLEVEL_OUTOF10: 3

## 2024-09-03 ASSESSMENT — PAIN DESCRIPTION - DESCRIPTORS
DESCRIPTORS: ACHING;DISCOMFORT;DULL
DESCRIPTORS: ACHING

## 2024-09-03 ASSESSMENT — PAIN DESCRIPTION - LOCATION
LOCATION: HEAD;NECK
LOCATION: HEAD
LOCATION: HEAD;NECK

## 2024-09-03 ASSESSMENT — PAIN DESCRIPTION - ORIENTATION: ORIENTATION: MID

## 2024-09-03 NOTE — DISCHARGE SUMMARY
Department of Internal Medicine        CHIEF COMPLAINT:  + SOB, chest pain    Reason for Admission: Acute renal failure, CHF, elevated troponin    HISTORY OF PRESENT ILLNESS:      The patient is a 74 y.o. male who presents with increased shortness of breath that started this morning when he woke up.  Patient states he did have some chest pressure last night but did resolve.  Patient denied any dizziness, palpitation or syncope.  Patient denied any problem with fever/chills, cough, nausea/vomiting.  Patient was just discharged 8/31.  Patient had prolonged hospitalization and on August 24 the patient left the hospital without signing out AMA but ended up coming back in a couple hours later to the ED complaining of shortness of breath.  During the last stay the patient renal function continued to get worse and was started on dialysis.  On discharge the patient denies shortness of breath or chest pain.  Patient was ambulating in the room and hallway by himself.  Patient did not meet criteria for home O2.  In the ED the patient proBNP was 40,000 with a troponin of 360 with repeat of 351.  WBC 6.5 hemoglobin 11.6.  Patient's temperature was 97.6 heart rate 75 blood pressure 149/81.  O2 sat 92% on room air initially.  About 3 hours after being in the ED the patient did have some desaturation on room air and was put on 2 L nasal cannula.  Chest x-ray show small-moderate-sized bilateral pleural effusions with bibasilar atelectasis.  Findings were similar to chest x-ray on 8/27.  Because of the mild hypoxia and elevated troponin associated with chest pain last night the patient was admitted for further evaluation.    9/2/2024  Patient seen examined on [facility].  Patient denies any current chest pain, abdominal pain, nausea or vomiting or unusual shortness of breath at rest.  Patient's troponin this morning was 349.  BUN/creatinine 15/3.4.  Fasting blood sugar 121.  WBC 6.5 with hemoglobin 11.6.  Temperature 97.3 with heart rate 81

## 2024-09-03 NOTE — CARE COORDINATION
have prevented readmission.)      Advance Directives:      Code Status: Prior   Patient's Primary Decision Maker is: Legal Next of Kin    Primary Decision Maker: Dawn David - Brother/Sister - [phone]    Discharge Planning:    Patient lives with: Alone Type of Home: Trailer/Mobile Home  Primary Care Giver: Self  Patient Support Systems include: Friends/Neighbors   Current Financial resources:    Current community resources:    Current services prior to admission: None            Current DME:              Type of Home Care services:  None    ADLS  Prior functional level: Independent in ADLs/IADLs  Current functional level: Independent in ADLs/IADLs    PT AM-PAC:   /24  OT AM-PAC:   /24    Family can provide assistance at DC: No  Would you like Case Management to discuss the discharge plan with any other family members/significant others, and if so, who? No  Plans to Return to Present Housing: Unknown at present  Other Identified Issues/Barriers to RETURNING to current housing: No barriers identified at this time.   Potential Assistance needed at discharge: Outpatient PT/OT, Meals On Wheels, Home Care            Potential DME:    Patient expects to discharge to: Trailer/mobile home  Plan for transportation at discharge:      Financial    Payor: [employer] / Plan: [employer] / Product Type: *No Product type* /     Does insurance require precert for SNF: Yes    Potential assistance Purchasing Medications:    Meds-to-Beds request: Yes      [facility] - [facility] [address] -  [phone] - F [phone]  [address]  [facility] [address]  Phone: [phone] Fax: [phone]      Notes:    Factors facilitating achievement of predicted outcomes: Cooperative    Barriers to discharge: No barriers identified at this time.     Additional Case Management Notes: Pt readmitted d/t SOB and elevated Troponin. SW met with pt. Pt is A&O. Resides alone in a mobile home with 4ste. Reports

## 2024-09-03 NOTE — DISCHARGE INSTRUCTIONS
Call your Primary Care Provider on the day of discharge to schedule a Hospital Follow Up appointment to be seen in 7 days.                   HEART FAILURE  / CONGESTIVE HEART FAILURE  DISCHARGE INSTRUCTIONS:  GUIDELINES TO FOLLOW AT HOME    Self- Managed Care:     MEDICATIONS:  Take your medication as directed. If you are experiencing any side effects, inform your doctor, Do not stop taking any of your medications without letting your doctor know.   Check with your doctor before taking any over-the-counter medications / herbal / or dietary supplements. They may interfere with your other medications.  Do not take ibuprofen (Advil or Motrin) and naproxen (Aleve) without talking to your doctor first. They could make your heart failure worse.         WEIGHT MONITORING:   Weigh yourself everyday (with the same scale) around the same time of the day and write it down. (you can chart them on a calendar or keep track of them on paper.   Notify your doctor of a weight gain of 3 pounds or more in 1 day   OR a total of 5 pounds or more in 1 week    Take your weight record to your doctor visits  Also, the same goes if you loose more than 3# in one day, let your heart doctor know.         DIET:   Cardiac heart healthy diet- Low saturated / low trans fat, no added salt, caffeine restricted, Low sodium diet-   No more than 2,000mg (2 grams) of salt / sodium per day (which equals to a little less than  a teaspoon of salt)  If your doctor wants you on a fluid restriction...it is usually recommended a fluid limit of 2,000cc -  Fluid restriction- 2,000 ml (milliliters) = 64 ounces = you can have 8 glasses of fluid per day (each glass 8 ounces)    Follow a low salt diet - avoid using salt at the table, avoid / limit use of canned soups, processed / packaged foods, salted snacks, olives and pickles.  Do not use a salt substitute without checking with your doctor, they may contain a high amount of potassioum. (Mrs. Ackerman is safe to

## 2024-09-03 NOTE — ACP (ADVANCE CARE PLANNING)
Advance Care Planning   Healthcare Decision Maker:    Primary Decision Maker: Dawn David - Brother/Sister - [phone]      Today we documented Decision Maker(s) consistent with Legal Next of Kin hierarchy.

## 2024-09-03 NOTE — PROGRESS NOTES
4 Eyes Skin Assessment     NAME:  Deion Knight  YOB: 1950  MEDICAL RECORD NUMBER:  [id number]    The patient is being assessed for  Admission    I agree that at least one RN has performed a thorough Head to Toe Skin Assessment on the patient. ALL assessment sites listed below have been assessed.      Areas assessed by both nurses:    Head, Face, Ears, Shoulders, Back, Chest, Arms, Elbows, Hands, Sacrum. Buttock, Coccyx, Ischium, Legs. Feet and Heels, and Under Medical Devices         Does the Patient have a Wound? No noted wound(s)       Conrad Prevention initiated by RN: Yes  Wound Care Orders initiated by RN: No    Pressure Injury (Stage 3,4, Unstageable, DTI, NWPT, and Complex wounds) if present, place Wound referral order by RN under : No    New Ostomies, if present place, Ostomy referral order under : No     Nurse 1 eSignature: Electronically signed by Audelia Wylie RN on 9/1/24 at 11:23 PM EDT    **SHARE this note so that the co-signing nurse can place an eSignature**    Nurse 2 eSignature: Electronically signed by Ashlee Blood RN on 9/2/24 at 0:24 AM EDT    
Pulse ox was 97% on room air at rest.  Ambulated in hallway. Pulse ox maintained 95% or greater while ambulating. Pt zahra well    
Report given to paramedic and called to cath lab. All belongings gathered to be sent with pt  
Spiritual Health Assessment/Progress Note  [facility]    Spiritual/Emotional Needs,  ,  ,      Name: Deion Knight MRN: [id number]    Age: 74 y.o.     Sex: male   Language: English   Restorationist: Anabaptist   Elevated troponin     Date: 9/3/2024                           Spiritual Assessment began in [facility]        Referral/Consult From: Rounding   Encounter Overview/Reason: Spiritual/Emotional Needs  Service Provided For: Patient    Lenore, Belief, Meaning:   Patient identifies as spiritual and has beliefs or practices that help with coping during difficult times  Family/Friends No family/friends present      Importance and Influence:  Patient has spiritual/personal beliefs that influence decisions regarding their health  Family/Friends no family/friends present    Community:  Patient expresses feelings of isolation: Other: Deion has friends that help him out but his sister is out of state.      Assessment and Plan of Care:     Patient Interventions include: Facilitated expression of thoughts and feelings, Explored spiritual coping/struggle/distress and theological reflection, and Affirmed coping skills/support systems  Prayer support provided.    Patient Plan of Care: Spiritual Care available upon further referral  Family/Friends Plan of Care: Spiritual Care available upon further referral    Electronically signed by MARGE Madrigal on 9/3/2024 at 10:32 AM   
disease stage G4.  Patient with no recovery of renal function and is most likely now dialysis dependent with end-stage disease.  We'll continue dialytic support and monitor for any recovery of renal function. We will check urine electrolytes and urea.  Continue hydration.  Check imaging study.    Hypertension with chronic kidney disease stage G1 to stage G4.  Benign essential hypertension.     Anemia of unknown etiology chronic kidney disease.   Follow hemoglobin and hematocrit.  Follow serum iron studies.  If needed will use JEREL and titrate the dose to a target hemoglobin of 10.0 g/dL.    Vitamin D deficiency.  Supplement.    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  Patient with a PTH level of 337.1 pg/ml and vitamin D level of 14.1 ng/ml. Supplement vitamin D.    Chronic kidney disease stage G4 of unknown etiology but likely secondary to hypertensive kidney disease  with a baseline serum creatinine of 3.3 mg/dL to 3.6 mg/dL.                 Gómez Hammonds MD  Nephrology  Electronically signed by Gómez Hammonds MD on 9/3/2024 at 12:18 PM      Note: This report was completed utilizing computer voice recognition software. Every effort has been made to ensure accuracy, however; inadvertent computerized transcription errors may be present.     
results found for: \"CHOLHDLRKARLAO\"  Recent Labs     24  0737   PROBNP 40,929*       Cardiac Tests:    EK2024: Sinus rhythm 85 bpm.  IVCD/incomplete left bundle branch block QRS duration 114 ms.  Anterolateral ST depression/T wave inversion    9/3/2024: Sinus rhythm 73 bpm.  Anterolateral ST depression T wave version [facility] ischemic     Telemetry: Sinus rhythm 70s     Chest X-ray:          Impression:         Borderline cardiomegaly with small to moderate-sized bilateral pleural  effusions and bibasilar atelectasis. Findings similar and unchanged when  compared to the prior study.             Echocardiogram:   TTE 24 RS    Left Ventricle: Severely reduced left ventricular systolic function with a visually estimated EF of 15 - 20%. Left ventricle is mildly dilated. Findings consistent with severe eccentric hypertrophy. Severe global hypokinesis present. Grade II diastolic dysfunction with increased LAP.    Right Ventricle: Right ventricle size is normal. Normal systolic function.    Aortic Valve: Mild sclerosis of the aortic valve cusps. Mild regurgitation.    Mitral Valve: Mild annular calcification. Mild regurgitation.    Tricuspid Valve: Unable to assess RVSP.    Left Atrium: Left atrium is moderately dilated.    Right Atrium: Right atrium size is normal.    Pericardium: Trivial pericardial effusion present. No indication of cardiac tamponade.    Image quality is adequate.     Stress test:       Cardiac catheterization:   Not available, prior cath done at [facility] some years ago denies prior intervention    ----------------------------------------------------------------------------------------------------------------------------------------------------------------  IMPRESSION:  NSTEMI hs-cTnT 360-349 ng/L.  Recent baseline 80s-100s.  EKG with ischemic ST changes  Coronary artery disease  Acute on chronic HFrEF proBNP 41,000  Severe cardiomyopathy EF 15 to 20% probably ischemic  NSVT  Mild MR, 
protein-calorie malnutrition (HCC) 08/30/2024    Acute decompensated heart failure ([facility]) 08/21/2024    Shortness of breath 08/21/2024    NICM (nonischemic cardiomyopathy) ([facility]) 08/21/2024    Elevated troponin 08/21/2024    Acute renal failure (ARF) ([facility]) 08/19/2024    ANGIE (acute kidney injury) ([facility]) 08/17/2024     Impression:  Elevated troponin-demand ischemia with the patient having new onset renal failure with hemodialysis dependent versus acute non-STEMI  Acute hypoxic respiratory failure in the setting of COPD, reduced EF CHF  History of chronic kidney disease stage IV with baseline serum creatinine 3.3 April 2024 with the patient progressing to chronic renal failure with hemodialysis on last admission August 2024  Acute on chronic systolic and diastolic congestive heart failure -EF 15-20%  Current tobacco abuse and probably underlying COPD  Non-insulin-dependent diabetes mellitus with hyperglycemia-hemoglobin A1c 7.4  History of orthostatic hypotension  History of hyperlipidemia  History of hypertension   History of depression  History of coronary artery disease  History of peripheral vascular disease  Vitamin D deficiency    Plan:  Patient admitted to monitored bed  Home medication reviewed  Activity up ad isrrael.  Heparin 5000 units subcu every 8  Compazine 5 mg IV push every 6 hours as needed for nausea  Glucoscans 4 times daily with sliding scale insulin  General Diet with salt restriction  Accurate I's and O's  DuoNeb aerosols 4 times daily  Pulmicort aerosol twice daily  Consult cardiology  Consult nephrology    Heparin drip and stop subcu heparin  Heart catheterization at [facility] 9/3    CMP, CBC in a.m.      Mauro Du DO, D.OByron  9/2/2024  9:37 AM

## 2024-09-03 NOTE — PLAN OF CARE
Problem: Discharge Planning  Goal: Discharge to home or other facility with appropriate resources  9/3/2024 0255 by Audelia Wylie, RN  Outcome: Progressing  Flowsheets (Taken 9/2/2024 2020)  Discharge to home or other facility with appropriate resources: Identify barriers to discharge with patient and caregiver

## 2024-09-03 NOTE — PLAN OF CARE
Problem: Discharge Planning  Goal: Discharge to home or other facility with appropriate resources  9/3/2024 0849 by Mayela Alonzo, RN  Outcome: Progressing  9/3/2024 0255 by Audelia Wylie, RN  Outcome: Progressing  Flowsheets (Taken 9/2/2024 2020)  Discharge to home or other facility with appropriate resources: Identify barriers to discharge with patient and caregiver

## 2024-09-03 NOTE — CONSULTS
[facility]   Inpatient CHF Nurse Navigator Consult      Cardiologist: [facility] Vick ([facility])/ [facility]     Deion Eugene is a 74 y.o. (1950) male with a history of HFrEF, most recent EF: 15-20%  Lab Results   Component Value Date    LVEF 15 08/20/2024    LVEFMODE Echo 08/20/2024       Patient was awake and alert, sitting up in bed during the consultation and is agreeable to heart failure education. He was engaged and asked appropriate questions throughout the education session. Patient admitted with SOB and orthopnea, ANGIE on CKD- new HD.  Patient is to be transferred to [facility] for cardiac catheterization today or tomorrow. Will notify HF Nurse Navigator at [facility] to follow.     Patient is agreeable to symptom monitoring, daily weights, and following a low sodium diet and [facility]. Patient is a current smoker and was advised to quit. States he has been cutting back and is currently using nicotine patches. Smoking Cessation Instructions on AVS. Patient instructed to call to schedule HFU upon discharge.    Barriers identified during consult contributing to HF Hospitalization:  [x] Limited medication adherence   [x] Poor health literacy, education regarding HF medications provided   [] Pill box provided to patient  [] Difficulty affording medications  [] Difficulty obtaining/ managing medications  [] Prescription assistance information given     [x] Not weighing themselves daily-states he has a scale   [x] Weight log provided for easy monitoring  [] Scale provided     [] Not following low sodium diet  [] Food insecurity   [x] 2 gram sodium diet education provided   [] Low sodium recipes provided  [] Sodium free seasoning provided   [] Low sodium meal delivery options given to patient  [] Dietician consulted     [] Lack of transportation to appointments     [] Depression, given chronic illness  [] Primary team notified     [] Goals of care need 
Nephrology progress note  9/2/2024 11:43 AM  Subjective:     Admit Date: 9/1/2024  PCP: Dean Alanis APRN - CNP    Interval History: I was paged via PublishThis at 10:38 AM for nephrology consultation.    Formal nephrology consult deferred.      The patient was just seen by me on 8/31/2024 (2 days ago) while receiving hemodialysis in the hospital.  To be readmitted yesterday with chest pain and shortness of breath.  Seen by cardiology.  [place] to be NSTEMI.     Asked to see him regarding acute kidney injury on top of stage IV chronic kidney disease likely reflecting end-stage kidney disease     Sitting up on bed wearing oxygen nasal cannula complaining of shortness of breath but no more chest pain.  No other complaints.    Diet: ADULT DIET; Regular; 5 carb choices (75 gm/meal); No Added Salt (3-4 gm)  Diet NPO Exceptions are: Sips of Water with Meds  History reviewed. No pertinent past medical history.  History reviewed. No pertinent family history.      Social History     Socioeconomic History    Marital status: Single     Spouse name: Not on file    Number of children: Not on file    Years of education: Not on file    Highest education level: Not on file   Occupational History    Not on file   Tobacco Use    Smoking status: Every Day     Current packs/day: 0.50     Types: Cigarettes    Smokeless tobacco: Never   Vaping Use    Vaping status: Never Used   Substance and Sexual Activity    Alcohol use: Never    Drug use: Not Currently    Sexual activity: Not on file   Other Topics Concern    Not on file   Social History Narrative    Not on file     Social Determinants of Health     Financial Resource Strain: Not on file   Food Insecurity: No Food Insecurity (9/1/2024)    Hunger Vital Sign     Worried About Running Out of Food in the Last Year: Never true     Ran Out of Food in the Last Year: Never true   Transportation Needs: No Transportation Needs (9/1/2024)    PRAPARE - Transportation     Lack of Transportation 
  HGB 10.6* 12.1* 11.9*   HCT 33.7* 39.4 38.6   MCV 93.1 94.9 94.8   MCH 29.3 29.2 29.2   MCHC 31.5* 30.7* 30.8*   RDW 13.9 13.9 13.8    234 228   MPV 10.3 9.2 9.6     No results found for: \"CKTOTAL\", \"CKMB\", \"CKMBINDEX\", \"TROPONINI\"  Lab Results   Component Value Date    INR 1.2 2024    PROTIME 12.8 (H) 2024     Lab Results   Component Value Date    TSH 3.83 2024     Lab Results   Component Value Date    LABA1C 7.5 (H) 2024     No results found for: \"EAG\"  Lab Results   Component Value Date    CHOL 183 2024     Lab Results   Component Value Date    TRIG 87 2024     Lab Results   Component Value Date    HDL 70 2024     No components found for: \"LDLCALC\", \"LDLCHOLESTEROL\"  Lab Results   Component Value Date    VLDL 17 2024     No results found for: \"CHOLHDLRATIO\"  Recent Labs     24  0737   PROBNP 40,929*       Cardiac Tests:  EK2024: Sinus rhythm 85 bpm.  IVCD/incomplete left bundle branch block QRS duration 114 ms.  Anterolateral ST depression/T wave inversion    Telemetry: Sinus rhythm 80s, NSVT 12 beats    Chest X-ray:     Impression:        Borderline cardiomegaly with small to moderate-sized bilateral pleural  effusions and bibasilar atelectasis. Findings similar and unchanged when  compared to the prior study.           Echocardiogram:   TTE 24 RS    Left Ventricle: Severely reduced left ventricular systolic function with a visually estimated EF of 15 - 20%. Left ventricle is mildly dilated. Findings consistent with severe eccentric hypertrophy. Severe global hypokinesis present. Grade II diastolic dysfunction with increased LAP.    Right Ventricle: Right ventricle size is normal. Normal systolic function.    Aortic Valve: Mild sclerosis of the aortic valve cusps. Mild regurgitation.    Mitral Valve: Mild annular calcification. Mild regurgitation.    Tricuspid Valve: Unable to assess RVSP.    Left Atrium: Left atrium is moderately

## 2024-09-03 NOTE — FLOWSHEET NOTE
Patient arrived to the unit with the following belongings:   09/03/24 6210   Belongings   Dental Appliances None   Vision - Corrective Lenses None   Hearing Aid None   Clothing Belt;Footwear;Pants;Shirt;Socks;Undergarments;At bedside   Jewelry None   Electronic Devices Cell Phone;   Weapons (Notify Protective Services/Security) None   Other Valuables Money;Purse;At bedside   Home Medications None   Valuables Given To Patient   Provide Name(s) of Who Valuable(s) Were Given To Deion

## 2024-09-03 NOTE — PLAN OF CARE
Patient's chart updated to reflect:        - HF care plan, HF education points and HF discharge instructions.  -Orders: 2 gram sodium diet, daily weights, I/O.  -PCP and cardiology follow up appointments to be scheduled within 7 days of hospital discharge.  -CHF education session will be provided to the patient prior to hospital discharge.    most recent EF:  Lab Results   Component Value Date    LVEF 15 08/20/2024    LVEFMODE Echo 08/20/2024       Federica Cueva, RN MSN,RN  Heart Failure Navigator    No future appointments.

## 2024-09-03 NOTE — PLAN OF CARE
Problem: ABCDS Injury Assessment  Goal: Absence of physical injury  Outcome: Progressing     Problem: Pain  Goal: Verbalizes/displays adequate comfort level or baseline comfort level  Outcome: Progressing     Problem: Discharge Planning  Goal: Discharge to home or other facility with appropriate resources  Outcome: Progressing  Flowsheets (Taken 9/2/2024 2020)  Discharge to home or other facility with appropriate resources: Identify barriers to discharge with patient and caregiver

## 2024-09-03 NOTE — PROGRESS NOTES
4 Eyes Skin Assessment     NAME:  Deion Knight  YOB: 1950  MEDICAL RECORD NUMBER:  [id number]    The patient is being assessed for  Admission    I agree that at least one RN has performed a thorough Head to Toe Skin Assessment on the patient. ALL assessment sites listed below have been assessed.      Areas assessed by both nurses:    Head, Face, Ears, Shoulders, Back, Chest, Arms, Elbows, Hands, Sacrum. Buttock, Coccyx, Ischium, Legs. Feet and Heels, and Under Medical Devices         Does the Patient have a Wound? No noted wound(s)     Scattered dry scabs BUE and BLE  Tessio present Right upper chest  Conrad Prevention initiated by RN: No  Wound Care Orders initiated by RN: No    Pressure Injury (Stage 3,4, Unstageable, DTI, NWPT, and Complex wounds) if present, place Wound referral order by RN under : No    New Ostomies, if present place, Ostomy referral order under : No     Nurse 1 eSignature: Electronically signed by Candice Chapin RN on 9/3/24 at 6:56 PM EDT    **SHARE this note so that the co-signing nurse can place an eSignature**    Nurse 2 eSignature: Electronically signed by ANDREY DAVIS RN on 9/3/24 at 6:57 PM EDT

## 2024-09-03 NOTE — H&P
No H&P noted.  For details see cardiology consult from 9/1/2024 and subsequent cardiology follow-up notes from 9/2/2024 and 9/3/2024.  Patient with newly diagnosed cardiomyopathy with severe LV systolic dysfunction  Presented again to the [facility] hospital with chest pain and shortness of breath and troponins levels higher than baseline troponin levels and with EKG changes  Sent today to [facility] for cardiac catheterization +/- PCI  The procedure, risks, benefits and alternative therapies were explained to patient.  He understood  To proceed.    Electronically signed by Talha Vu MD on 9/3/2024 at 1:01 PM    
elsewhere as well as emphysematous changes most evident in the mid and upper lungs.  No pneumothorax. Heart:  No acute findings.  No cardiomegaly.  No significant pericardial effusion.  No evidence of RV dysfunction. Bones/joints:  Multilevel degenerative changes of the spine. No definite acute bony abnormality is noted.  No dislocation. Soft tissues:  No acute findings. Lymph nodes:  No acute findings.  No enlarged lymph nodes. Kidneys and ureters:  Simple appearing left renal cyst fine no additional follow-up.     1.  No evidence of acute pulmonary emboli. 2.  Small to moderate bilateral pleural effusions with some mild adjacent lung atelectasis.  There is some minimal scattered lung scarring elsewhere as well as emphysematous changes most evident in the mid and upper lungs.       ASSESSMENT AND PLAN:    Obstructive CAD status post successful balloon angioplasty and MARTIN deployment from the mid to the proximal/ostial RCA.  Has been started on Plavix 75 mg, aspirin 81 mg daily, Lipitor 80 mg daily.  Toprol-XL, Imdur.  Cardiology recommended possible staged PCI.    Community-acquired pneumonia.  On doxycycline and Omnicef  Chronic HFpEF  Cardiomyopathy  Type 2 diabetes mellitus  ANGIE on CKD.  Has been started on hemodialysis.  Nephrology consult  Anemia  Hypertension  COPD  GERD  Hyperlipidemia  Smoking              Diet: ADULT DIET; Regular; 4 carb choices (60 gm/meal); Low Fat/Low Chol/High Fiber/2 gm Na  Code Status: Full Code  Surrogate decision maker confirmed with patient:   Extended Emergency Contact Information  Primary Emergency Contact: Dawn David  Mobile Phone: [phone]  Relation: Brother/Sister    DVT Prophylaxis: []Lovenox []Heparin []PCD [] Warfarin/NOAC []Encouraged ambulation  Disposition: []Med/Surg [] Intermediate [] ICU/CCU  Admit status: [] Observation [] Inpatient     +++++++++++++++++++++++++++++++++++++++++++++++++  Maria Marino Milanes, MD  [facility]  [facility]

## 2024-09-04 VITALS
HEART RATE: 84 BPM | DIASTOLIC BLOOD PRESSURE: 59 MMHG | BODY MASS INDEX: 22.11 KG/M2 | TEMPERATURE: 97.3 F | OXYGEN SATURATION: 96 % | HEIGHT: 66 IN | RESPIRATION RATE: 18 BRPM | SYSTOLIC BLOOD PRESSURE: 94 MMHG | WEIGHT: 137.57 LBS

## 2024-09-04 LAB
ACTIVATED CLOTTING TIME, LOW RANGE: 321 SEC
ANION GAP SERPL CALCULATED.3IONS-SCNC: 10 MMOL/L (ref 7–16)
BUN SERPL-MCNC: 19 MG/DL (ref 6–23)
CALCIUM SERPL-MCNC: 8.7 MG/DL (ref 8.6–10.2)
CHLORIDE SERPL-SCNC: 103 MMOL/L (ref 98–107)
CO2 SERPL-SCNC: 25 MMOL/L (ref 22–29)
CREAT SERPL-MCNC: 3.2 MG/DL (ref 0.7–1.2)
CREAT UR-MCNC: 60.2 MG/DL (ref 40–278)
EKG ATRIAL RATE: 82 BPM
EKG ATRIAL RATE: 83 BPM
EKG P AXIS: 67 DEGREES
EKG P AXIS: 75 DEGREES
EKG P-R INTERVAL: 144 MS
EKG P-R INTERVAL: 174 MS
EKG Q-T INTERVAL: 402 MS
EKG Q-T INTERVAL: 408 MS
EKG QRS DURATION: 106 MS
EKG QRS DURATION: 112 MS
EKG QTC CALCULATION (BAZETT): 472 MS
EKG QTC CALCULATION (BAZETT): 476 MS
EKG R AXIS: 14 DEGREES
EKG R AXIS: 31 DEGREES
EKG T AXIS: -147 DEGREES
EKG T AXIS: 173 DEGREES
EKG VENTRICULAR RATE: 82 BPM
EKG VENTRICULAR RATE: 83 BPM
ERYTHROCYTE [DISTWIDTH] IN BLOOD BY AUTOMATED COUNT: 13.9 % (ref 11.5–15)
GFR, ESTIMATED: 20 ML/MIN/1.73M2
GLUCOSE SERPL-MCNC: 117 MG/DL (ref 74–99)
HCT VFR BLD AUTO: 36.5 % (ref 37–54)
HGB BLD-MCNC: 11.4 G/DL (ref 12.5–16.5)
MCH RBC QN AUTO: 29.8 PG (ref 26–35)
MCHC RBC AUTO-ENTMCNC: 31.2 G/DL (ref 32–34.5)
MCV RBC AUTO: 95.5 FL (ref 80–99.9)
PLATELET # BLD AUTO: 203 K/UL (ref 130–450)
PMV BLD AUTO: 10.4 FL (ref 7–12)
POTASSIUM SERPL-SCNC: 4.3 MMOL/L (ref 3.5–5)
RBC # BLD AUTO: 3.82 M/UL (ref 3.8–5.8)
SODIUM SERPL-SCNC: 138 MMOL/L (ref 132–146)
SODIUM UR-SCNC: 72 MMOL/L
UUN UR-MCNC: 186 MG/DL (ref 800–1666)
WBC OTHER # BLD: 7.2 K/UL (ref 4.5–11.5)

## 2024-09-04 PROCEDURE — 82570 ASSAY OF URINE CREATININE: CPT

## 2024-09-04 PROCEDURE — 85027 COMPLETE CBC AUTOMATED: CPT

## 2024-09-04 PROCEDURE — 99239 HOSP IP/OBS DSCHRG MGMT >30: CPT | Performed by: INTERNAL MEDICINE

## 2024-09-04 PROCEDURE — 93010 ELECTROCARDIOGRAM REPORT: CPT | Performed by: INTERNAL MEDICINE

## 2024-09-04 PROCEDURE — 84540 ASSAY OF URINE/UREA-N: CPT

## 2024-09-04 PROCEDURE — 2580000003 HC RX 258: Performed by: INTERNAL MEDICINE

## 2024-09-04 PROCEDURE — 80048 BASIC METABOLIC PNL TOTAL CA: CPT

## 2024-09-04 PROCEDURE — 90935 HEMODIALYSIS ONE EVALUATION: CPT

## 2024-09-04 PROCEDURE — 36415 COLL VENOUS BLD VENIPUNCTURE: CPT

## 2024-09-04 PROCEDURE — 93005 ELECTROCARDIOGRAM TRACING: CPT | Performed by: INTERNAL MEDICINE

## 2024-09-04 PROCEDURE — 84300 ASSAY OF URINE SODIUM: CPT

## 2024-09-04 PROCEDURE — 99232 SBSQ HOSP IP/OBS MODERATE 35: CPT | Performed by: INTERNAL MEDICINE

## 2024-09-04 PROCEDURE — 6370000000 HC RX 637 (ALT 250 FOR IP): Performed by: INTERNAL MEDICINE

## 2024-09-04 PROCEDURE — 6370000000 HC RX 637 (ALT 250 FOR IP)

## 2024-09-04 RX ORDER — HEPARIN SODIUM 5000 [USP'U]/ML
5000 INJECTION, SOLUTION INTRAVENOUS; SUBCUTANEOUS EVERY 8 HOURS
Status: DISCONTINUED | OUTPATIENT
Start: 2024-09-04 | End: 2024-09-04 | Stop reason: HOSPADM

## 2024-09-04 RX ORDER — VITAMIN B COMPLEX
2000 TABLET ORAL DAILY
Status: DISCONTINUED | OUTPATIENT
Start: 2024-09-04 | End: 2024-09-04 | Stop reason: HOSPADM

## 2024-09-04 RX ORDER — CHOLECALCIFEROL (VITAMIN D3) 50 MCG
2000 TABLET ORAL DAILY
Qty: 60 TABLET | Refills: 0 | Status: SHIPPED | OUTPATIENT
Start: 2024-09-05

## 2024-09-04 RX ORDER — CLOPIDOGREL BISULFATE 75 MG/1
75 TABLET ORAL DAILY
Qty: 30 TABLET | Refills: 3 | Status: SHIPPED | OUTPATIENT
Start: 2024-09-05

## 2024-09-04 RX ADMIN — ISOSORBIDE MONONITRATE 30 MG: 30 TABLET, EXTENDED RELEASE ORAL at 11:02

## 2024-09-04 RX ADMIN — DOXYCYCLINE HYCLATE 100 MG: 100 CAPSULE ORAL at 11:03

## 2024-09-04 RX ADMIN — EZETIMIBE 10 MG: 10 TABLET ORAL at 11:03

## 2024-09-04 RX ADMIN — CLOPIDOGREL BISULFATE 75 MG: 75 TABLET ORAL at 11:01

## 2024-09-04 RX ADMIN — ASPIRIN 81 MG: 81 TABLET, COATED ORAL at 11:05

## 2024-09-04 RX ADMIN — SODIUM CHLORIDE, PRESERVATIVE FREE 10 ML: 5 INJECTION INTRAVENOUS at 11:02

## 2024-09-04 RX ADMIN — ALOGLIPTIN 6.25 MG: 6.25 TABLET, FILM COATED ORAL at 11:03

## 2024-09-04 RX ADMIN — CEFDINIR 300 MG: 300 CAPSULE ORAL at 11:03

## 2024-09-04 RX ADMIN — PANTOPRAZOLE SODIUM 40 MG: 40 TABLET, DELAYED RELEASE ORAL at 11:01

## 2024-09-04 RX ADMIN — BUPROPION HYDROCHLORIDE 300 MG: 300 TABLET, EXTENDED RELEASE ORAL at 11:03

## 2024-09-04 RX ADMIN — ATORVASTATIN CALCIUM 80 MG: 40 TABLET, FILM COATED ORAL at 11:01

## 2024-09-04 RX ADMIN — Medication 2000 UNITS: at 11:01

## 2024-09-04 RX ADMIN — Medication 1 TABLET: at 11:01

## 2024-09-04 RX ADMIN — METOPROLOL SUCCINATE 50 MG: 25 TABLET, EXTENDED RELEASE ORAL at 11:02

## 2024-09-04 ASSESSMENT — PAIN SCALES - GENERAL: PAINLEVEL_OUTOF10: 0

## 2024-09-04 NOTE — PROGRESS NOTES
[facility]  Nephrology Progress Note    Patient's Name: Deion Knight    HPI:  A full consult is deferred as patient was followed by our service at [facility].  He had originally presented to [facility] for concerns of shortness of breath.  He was found to have elevated troponins and concern for NSTEMI.  He is followed by our service for ANGIE with underlying CKD G4 on dialysis.  He was transferred to [facility], where he underwent heart catheterization with angioplasty and stent placement on 9/3.    Subjective:    9/4: Patient was seen and examined on HD.  He reports that he feels tired.  He notes that he had shortness of breath this morning.  He denies any current chest pain or shortness of breath.  He underwent cardiac catheterization yesterday.    PMH:    No past medical history on file.    Patient Active Problem List   Diagnosis    ANGIE (acute kidney injury) ([facility])    Acute renal failure (ARF) ([facility])    Acute decompensated heart failure ([facility])    Shortness of breath    NICM (nonischemic cardiomyopathy) ([facility])    Elevated troponin    Moderate protein-calorie malnutrition ([facility])    CAD (coronary artery disease)    NSTEMI (non-ST elevated myocardial infarction) ([facility])       Diet:    ADULT DIET; Regular; 4 carb choices (60 gm/meal); Low Fat/Low Chol/High Fiber/2 gm Na    Meds:     aspirin  81 mg Oral Daily    atorvastatin  80 mg Oral Daily    arformoterol 15 mcg-budesonide 0.5 mg neb solution   Nebulization BID RT    buPROPion  300 mg Oral QAM    cefdinir  300 mg Oral Daily    doxycycline hyclate  100 mg Oral 2 times per day    ezetimibe  10 mg Oral Daily    isosorbide mononitrate  30 mg Oral Daily    metoprolol succinate  50 mg Oral Daily    therapeutic multivitamin-minerals  1 tablet Oral Daily    nicotine  1 patch TransDERmal Daily    pantoprazole  40 mg Oral Daily    alogliptin  6.25 mg Oral Daily    ipratropium  0.5 mg Nebulization Q6H RT    sodium chloride flush  5-40 mL IntraVENous 2  recovery  Continue HD while inpatient-HD today    2.  CAD/cardiomyopathy/HFrEF  Concern for NSTEMI  Echo 8/2024-EF 15-20%, grade 2 diastolic dysfunction  proBNP 40,929 on 9/1  Troponin 351 on 9/1  S/p heart cath with angioplasty and stent placement 9/3  Strict I&O, daily weights  Monitor volume status  Cardiology following    3.  Anemia  Likely in part with CKD  Hemoglobin target 10-12  Hemoglobin 11.4-at target  No JEREL as hemoglobin at target  Monitor H&H    4.  Secondary hyperparathyroidism of renal insufficiency   and vitamin D deficiency  .7 and vitamin D 11.9 on 8/31  Phosphorus 2.5 on 9/3  Start vitamin D supplement  Monitor labs    5.  Intermittent hypotension  Monitor BPs    La Nena Sterling, APRN - CNP  Pt seen and examined on hd  Agree with above  Tolerating hd  Hd mwf  Yousif Soliz MD

## 2024-09-04 NOTE — PLAN OF CARE
Problem: Safety - Adult  Goal: Free from fall injury  9/4/2024 1612 by Candice Chapin RN  Outcome: Completed     Problem: Discharge Planning  Goal: Discharge to home or other facility with appropriate resources  9/4/2024 1612 by Candice Chapin RN  Outcome: Completed     Problem: Pain  Goal: Verbalizes/displays adequate comfort level or baseline comfort level  9/4/2024 1612 by Candice Chapin RN  Outcome: Completed     Problem: ABCDS Injury Assessment  Goal: Absence of physical injury  9/4/2024 1612 by Candice Chapin RN  Outcome: Completed     Problem: Cardiovascular - Adult  Goal: Maintains optimal cardiac output and hemodynamic stability  9/4/2024 1612 by Candice Chapin RN  Outcome: Completed     Problem: Cardiovascular - Adult  Goal: Absence of cardiac dysrhythmias or at baseline  9/4/2024 1612 by Candice Chapin RN  Outcome: Completed     Problem: Metabolic/Fluid and Electrolytes - Adult  Goal: Electrolytes maintained within normal limits  9/4/2024 1612 by Candice Chapin RN  Outcome: Completed

## 2024-09-04 NOTE — CONSULTS
Met with patient and discussed that their physician has ordered a referral to our outpatient Phase II Cardiac Rehabilitation program. Reviewed the benefits of cardiac rehabilitation based on their diagnosis and personal risk factors. Patient demonstrates mild interest in Cardiac Rehabilitation at this time. Cardiac Rehabilitation brochure provided to patient/family. The Cardiac Rehabilitation Program has been provided the patient's referral information and pertinent patient details and history. The patient may call [facility] Cardiac Rehabilitation at [phone] for additional information or questions. Contact information for [facility] Cardiac Rehabilitation and other choices close to the patient's residence have been provided in the discharge instructions. Thank you for the referral.

## 2024-09-04 NOTE — PROGRESS NOTES
RN messaged Leslie Adair about patient's EF 15-20% asking if he needs a lifevest prior to discharge. No lifevest needed at this time per Leslie.

## 2024-09-04 NOTE — PROGRESS NOTES
CLINICAL PHARMACY NOTE: MEDS TO BEDS    Total # of Prescriptions Filled: 2   The following medications were delivered to the patient:  Vitamin d3 2000 unit  Clopidogrel 75 mg    Additional Documentation:   Delivered to pt

## 2024-09-04 NOTE — PLAN OF CARE
Problem: Safety - Adult  Goal: Free from fall injury  9/4/2024 1254 by Candice Chapin RN  Outcome: Progressing     Problem: Discharge Planning  Goal: Discharge to home or other facility with appropriate resources  9/4/2024 1254 by Candice Chapin RN  Outcome: Progressing     Problem: Pain  Goal: Verbalizes/displays adequate comfort level or baseline comfort level  9/4/2024 1254 by Candice Chapin RN  Outcome: Progressing     Problem: ABCDS Injury Assessment  Goal: Absence of physical injury  9/4/2024 1254 by Candice Chapin RN  Outcome: Progressing     Problem: Cardiovascular - Adult  Goal: Maintains optimal cardiac output and hemodynamic stability  Outcome: Progressing     Problem: Cardiovascular - Adult  Goal: Absence of cardiac dysrhythmias or at baseline  Outcome: Progressing     Problem: Metabolic/Fluid and Electrolytes - Adult  Goal: Electrolytes maintained within normal limits  Outcome: Progressing

## 2024-09-04 NOTE — DISCHARGE INSTRUCTIONS
Home Health Care will be calling you to see you in your home. Your next dialysis treatment is Friday, Sep. 6th.    Cardiac Rehabilitation: Discharge instructions        Cardiac rehabilitation is a program for people who have a heart problem, such as a heart attack, coronary stent placed, heart failure, or a heart valve disease. The program includes exercise, lifestyle changes, education, and emotional support. Cardiac rehab can help you improve the quality of your life through better overall health. It can help you lose weight and feel better about yourself.    On your cardiac rehab team, you may have your doctor, a nurse specialist, an exercise physiologist, and a dietitian. They will design your cardiac rehab program specifically for you. You will learn how to reduce your risk for heart problems, how to manage stress, and how to eat a heart-healthy diet. By the end of the program, you will be ready to maintain a healthier lifestyle on your own.    Follow-up care is a key part of your treatment and safety. Be sure to make and go to all appointments, and call your doctor if you are having problems. It's also a good idea to know your test results and keep a list of the medicines you take.    Please call to schedule your first appointment once you have been cleared by your cardiologist to attend Phase II Outpatient Cardiac Rehabilitation.       Cardiac Rehabilitation Options:  [facility]             [address]                                                                                          Cardiology Services  [place], [place] [address]                                                                              [address]- [phone]                                                                                         [place] [address]  Hours: M/W/F 7am-7pm & T/Th 7am-12pm

## 2024-09-04 NOTE — DISCHARGE SUMMARY
stenosis in the proximal third.  Inferior subdivision has a 60% ostial narrowing, a focal 70% proximal narrowing and another eccentric 70% mid narrowing LCx: Nondominant vessel giving very small obtuse marginal branches.  There was 90% stenosis in the mid left circumflex after the atrial branch after 2 trivial first and second marginal branches and 95% distal stenosis before very small third fourth and fifth marginal branches with MADDY II flow in the very small fourth marginal branch.  The distal left circumflex was a very small caliber vessel measuring around 1 mm in diameter RCA: Dominant vessel with severe disease extending from the proximal to the mid vessel with up to 95% stenosis in the mid vessel followed by tubular 50% narrowing before the vessel divided early to give the posterolateral branch and the posterior ascending artery branch 2.  Severely dilated left ventricle with severe generalized hypokinesis with an estimate ejection fraction of 15-20% 3.  LVEDP = 6 mm HG 4.  There was no gradient across the aortic valve on pullback 5.  Successful balloon angioplasty with the deployment of 2 overlapping drug-eluting coronary stents from the mid to the proximal/ostial RCA with dilatation of the stented segment with a larger high-pressure noncompliant balloon with very good results with MADDY-3 flow in the vessel       Patient Instructions:      Medication List        START taking these medications      clopidogrel 75 MG tablet  Commonly known as: PLAVIX  Take 1 tablet by mouth daily  Start taking on: September 5, 2024     vitamin D 50 MCG (2000 UT) Tabs tablet  Commonly known as: CHOLECALCIFEROL  Take 1 tablet by mouth daily  Start taking on: September 5, 2024            CONTINUE taking these medications      aspirin 81 MG EC tablet     atorvastatin 80 MG tablet  Commonly known as: LIPITOR     buPROPion 300 MG extended release tablet  Commonly known as: WELLBUTRIN XL     ezetimibe 10 MG tablet  Commonly known as:

## 2024-09-04 NOTE — PROGRESS NOTES
MARTIN from mid to the proximal/ostial RCA (see full report below)    2024 underwent dialysis      SUBJECTIVE: Denies chest pain.  States intermittent shortness of breath but currently feeling well without any difficulty breathing  OBJECTIVE: No apparent distress     ROS:  Consist: Denies fevers, chills or night sweats  Heart: Denies chest pain, palpitations, lightheadedness, dizziness or syncope  Lungs: Denies SOB, cough, wheezing, orthopnea or PND  GI: Denies abdominal pain, vomiting or diarrhea    PHYSICAL EXAM:   /83   Pulse 78   Temp 96.9 °F (36.1 °C)   Resp 16   Ht 1.676 m (5' 6\")   Wt 62.4 kg (137 lb 9.1 oz)   SpO2 93%   BMI 22.20 kg/m²    B/P Range last 24 hours: Systolic (24hrs), Av , Min:83 , Max:148    Diastolic (24hrs), Av, Min:66, Max:95    CONST: Well developed, thin  male who appears his stated age. Awake, alert and cooperative. No apparent distress  HEENT:   Head- Normocephalic, atraumatic   Eyes- Conjunctivae pink, anicteric  Throat- Oral mucosa pink and moist  Neck-  No stridor, trachea midline, no jugular venous distention. No carotid bruit  CHEST: Chest symmetrical and non-tender to palpation. No accessory muscle use or intercostal retractions. R chest wall Tessio catheter.   RESPIRATORY:  Lung sounds -fairly clear bilaterally  CARDIOVASCULAR:     Heart Ausculation- Regular rate and rhythm, no murmur. No s3, s4 or rub   PV: No lower extremity edema. No varicosities. Pedal pulses palpable, no clubbing or cyanosis. R radial site without hematoma or bleeding.    ABDOMEN: Soft, non-tender to light palpation. Bowel sounds present. No palpable masses no organomegaly; no abdominal bruit  MS: Good muscle strength and tone. No atrophy or abnormal movements.   : Deferred  SKIN: Warm and dry no statis dermatitis or ulcers   NEURO / PSYCH: Oriented to person, place and time. Speech clear and appropriate. Follows all commands. Pleasant affect       Intake/Output Summary (Last  low-dose ACE inhibitor therapy/ARB/Entresto  Fluid management as per nephrology as per dialysis  Smoking cessation strongly encouraged  May be discharged from cardiac standpoint when okay with others.  Cardiology will sign off.  Please call if needed.      I spent 35 minutes completing this encounter. Total time included the following:  Independently interviewing the patient (HPI, ROS, PMH, PSH, FMH, SH, allergies and medications).  Independently performing a medical appropriate examination  Ordering medications, tests and/or procedures  Formulating the assessment/plan and reviewing the rationale for the above recommendations  Reviewing available records, results of all previously ordered testing/procedures and current problem list  Counseling/educating the patient  Coordinating care with other healthcare professionals  Documenting clinical information in the patient's electronic health records    Electronically signed by Talha Vu MD on 9/4/2024 at 10:50 AM

## 2024-09-04 NOTE — PROGRESS NOTES
[facility] Hospitalist Progress Note    Admitting Date and Time: 9/3/2024  3:22 PM  Admit Dx: NSTEMI (non-ST elevated myocardial infarction) (HCC) [I21.4]  CAD (coronary artery disease) [I25.10]    Synopsis: Patient is a 74-year-old gentleman who was transferred from [facility] to [facility] with chest pain and shortness of breath and elevated troponin.  She was diagnosed with NSTEMI and transferred here for cardiac catheterization.  She underwent successful balloon angioplasty with 2 overlapping MARTIN from mid to proximal RCA.  Possible staged PCI.    Subjective:  Patient is being followed for NSTEMI (non-ST elevated myocardial infarction) (HCC) [I21.4]  CAD (coronary artery disease) [I25.10]     Seen after hemodialysis today.  He reports feeling chest pain and shortness of breath earlier in the morning.  Now he feels better, symptoms have resolved.    ROS: denies fever, chills, cp, sob, n/v, HA unless stated above.      Vitamin D  2,000 Units Oral Daily    aspirin  81 mg Oral Daily    atorvastatin  80 mg Oral Daily    arformoterol 15 mcg-budesonide 0.5 mg neb solution   Nebulization BID RT    buPROPion  300 mg Oral QAM    cefdinir  300 mg Oral Daily    ezetimibe  10 mg Oral Daily    isosorbide mononitrate  30 mg Oral Daily    metoprolol succinate  50 mg Oral Daily    therapeutic multivitamin-minerals  1 tablet Oral Daily    nicotine  1 patch TransDERmal Daily    pantoprazole  40 mg Oral Daily    alogliptin  6.25 mg Oral Daily    ipratropium  0.5 mg Nebulization Q6H RT    sodium chloride flush  5-40 mL IntraVENous 2 times per day    clopidogrel  75 mg Oral Daily     albuterol, 2.5 mg, Q6H PRN  sodium chloride flush, 5-40 mL, PRN  sodium chloride, , PRN  acetaminophen, 650 mg, Q4H PRN  nitroGLYCERIN, 0.4 mg, Q5 Min PRN         Objective:    BP (!) 94/59   Pulse 84   Temp 97.3 °F (36.3 °C) (Temporal)   Resp 18   Ht 1.676 m (5' 6\")   Wt 62.4 kg (137 lb 9.1 oz)   SpO2 96%   BMI 22.20 kg/m²     General

## 2024-09-04 NOTE — PROGRESS NOTES
RN spoke with Leslie Adair with cardiology and patient is okay for discharge from a cardiac standpoint.

## 2024-09-04 NOTE — PROGRESS NOTES
Patient ambulated back to our nurse's station stating that his taxi never came to pick him up. RN tried calling [employer] company twice with no answer and the mailbox was full, so I was unable to leave a message. RN called the  Juni Burt and left a message. RN escalated to Ana clinical nurse manager. Ana brought a taxi voucher for the patient. This RN called [employer] to set up a ride. RN will transport to the taxi when they call that they are here for pickup.

## 2024-09-04 NOTE — CARE COORDINATION
Chart reviewed for transition of care at discharge. Admitted for CAD, NSTEMI. Pt is a transfer from [facility]. He is off the floor at . Per chart review,  S/P heart cath with 2 stents. Per cardiology notes, possible staged PCI. He is set up with HD at [facility] in [place] M-W F at 420 PM.  He was supposed to start Monday, but was admitted to the hospital. Called to verify with [facility]. They will need updated when discharged. He was also accepted by [facility] who has not opened his case yet. Orders in Epic. They will need updated at discharge. Per CM notes, he lives alone in a mobile home with 4 steps. Per chart review, he is independent and has a walker and a shower chair. No history of ANGELES. [facility], and clinicals faxed. D/C plan is home with [facility] when medically stable. Will need therapy to evaluate for d/c planning when medically able. [facility].  Per notes, he is stating he needs transportation home.  Will verify and complete assessment when pt returns to the floor. CM/SW will follow.     Electronically signed by Juni Burt RN on 9/4/2024 at 11:13 AM    Addendum: Met with patient who verified all information. He stated he will  need a ride home. The cheapest is [employer] in [place]. They quoted 30$ for his address. When ready to d/c call [phone] to arrange ride. Open from 7am to 10 pm. Per RN, pt to be discharged today. Updated Azalia at [facility] and faxed clinicals. La at [facility] also updated. Pt updated that [facility] will notify him of apt. When discharged, RN to call [employer] for his ride. MB

## 2024-09-04 NOTE — PROGRESS NOTES
Heart monitor removed, cleaned and returned to nurse's station. IV's removed. Discharge instructions given to patient. RN called for taxi ride who stated they are on their way. RN put in for wheelchair transport stat at this time.

## 2024-09-04 NOTE — FLOWSHEET NOTE
09/04/24 1027   Vital Signs   /83   Temp 96.9 °F (36.1 °C)   Pulse 78   Respirations 16   Weight - Scale 62.4 kg (137 lb 9.1 oz)   Weight Method Bed scale   Percent Weight Change -1.74   Post-Hemodialysis Assessment   Post-Treatment Procedures Blood returned;Catheter capped, clamped and heparinized x 2 ports   Machine Disinfection Process Exterior Machine Disinfection   Rinseback Volume (ml) 300 ml   Blood Volume Processed (Liters) 90.4 L   Dialyzer Clearance Lightly streaked   Duration of Treatment (minutes) 240 minutes   Heparin Amount Administered During Treatment (mL) 0 mL   Hemodialysis Intake (ml) 300 ml   Hemodialysis Output (ml) 800 ml   NET Removed (ml) 500   Tolerated Treatment Good   Patient Response to Treatment tolerated well, blood returned, cath care per policy/procedure, lines flushed, heparin instilled ports capped

## 2024-09-05 LAB
EKG ATRIAL RATE: 73 BPM
EKG P AXIS: 58 DEGREES
EKG P-R INTERVAL: 148 MS
EKG Q-T INTERVAL: 414 MS
EKG QRS DURATION: 106 MS
EKG QTC CALCULATION (BAZETT): 456 MS
EKG R AXIS: -14 DEGREES
EKG T AXIS: -179 DEGREES
EKG VENTRICULAR RATE: 73 BPM

## 2024-09-30 ENCOUNTER — HOSPITAL ENCOUNTER (INPATIENT)
Age: 74
LOS: 4 days | Discharge: HOME HEALTH | End: 2024-10-04
Attending: EMERGENCY MEDICINE | Admitting: INTERNAL MEDICINE
Payer: OTHER GOVERNMENT

## 2024-09-30 ENCOUNTER — APPOINTMENT (OUTPATIENT)
Dept: GENERAL RADIOLOGY | Age: 74
End: 2024-09-30
Payer: OTHER GOVERNMENT

## 2024-09-30 DIAGNOSIS — I47.10 SVT (SUPRAVENTRICULAR TACHYCARDIA) (HCC): ICD-10-CM

## 2024-09-30 DIAGNOSIS — I47.10 PAROXYSMAL SUPRAVENTRICULAR TACHYCARDIA (HCC): Primary | ICD-10-CM

## 2024-09-30 DIAGNOSIS — I24.89 DEMAND ISCHEMIA (HCC): ICD-10-CM

## 2024-09-30 DIAGNOSIS — R79.89 ELEVATED TROPONIN: ICD-10-CM

## 2024-09-30 DIAGNOSIS — N18.6 ESRD (END STAGE RENAL DISEASE) (HCC): ICD-10-CM

## 2024-09-30 DIAGNOSIS — D63.8 ANEMIA OF CHRONIC DISEASE: ICD-10-CM

## 2024-09-30 LAB
ALBUMIN SERPL-MCNC: 2.3 G/DL (ref 3.5–5.2)
ALP SERPL-CCNC: 127 U/L (ref 40–129)
ALT SERPL-CCNC: 22 U/L (ref 0–40)
ANION GAP SERPL CALCULATED.3IONS-SCNC: 10 MMOL/L (ref 7–16)
AST SERPL-CCNC: 65 U/L (ref 0–39)
BASOPHILS # BLD: 0.03 K/UL (ref 0–0.2)
BASOPHILS NFR BLD: 0 % (ref 0–2)
BILIRUB SERPL-MCNC: 0.3 MG/DL (ref 0–1.2)
BUN SERPL-MCNC: 7 MG/DL (ref 6–23)
CALCIUM SERPL-MCNC: 8.2 MG/DL (ref 8.6–10.2)
CHLORIDE SERPL-SCNC: 96 MMOL/L (ref 98–107)
CO2 SERPL-SCNC: 27 MMOL/L (ref 22–29)
CREAT SERPL-MCNC: 1.9 MG/DL (ref 0.7–1.2)
EKG ATRIAL RATE: 97 BPM
EKG ATRIAL RATE: 99 BPM
EKG P AXIS: 47 DEGREES
EKG P AXIS: 64 DEGREES
EKG P-R INTERVAL: 144 MS
EKG P-R INTERVAL: 160 MS
EKG Q-T INTERVAL: 378 MS
EKG Q-T INTERVAL: 400 MS
EKG QRS DURATION: 100 MS
EKG QRS DURATION: 106 MS
EKG QTC CALCULATION (BAZETT): 480 MS
EKG QTC CALCULATION (BAZETT): 513 MS
EKG R AXIS: -3 DEGREES
EKG T AXIS: 139 DEGREES
EKG T AXIS: 165 DEGREES
EKG VENTRICULAR RATE: 97 BPM
EKG VENTRICULAR RATE: 99 BPM
EOSINOPHIL # BLD: 0.06 K/UL (ref 0.05–0.5)
EOSINOPHILS RELATIVE PERCENT: 1 % (ref 0–6)
ERYTHROCYTE [DISTWIDTH] IN BLOOD BY AUTOMATED COUNT: 13.9 % (ref 11.5–15)
GFR, ESTIMATED: 36 ML/MIN/1.73M2
GLUCOSE SERPL-MCNC: 172 MG/DL (ref 74–99)
HCT VFR BLD AUTO: 30.3 % (ref 37–54)
HGB BLD-MCNC: 9.5 G/DL (ref 12.5–16.5)
IMM GRANULOCYTES # BLD AUTO: 0.04 K/UL (ref 0–0.58)
IMM GRANULOCYTES NFR BLD: 1 % (ref 0–5)
LYMPHOCYTES NFR BLD: 0.67 K/UL (ref 1.5–4)
LYMPHOCYTES RELATIVE PERCENT: 9 % (ref 20–42)
MAGNESIUM SERPL-MCNC: 2 MG/DL (ref 1.6–2.6)
MCH RBC QN AUTO: 29.3 PG (ref 26–35)
MCHC RBC AUTO-ENTMCNC: 31.4 G/DL (ref 32–34.5)
MCV RBC AUTO: 93.5 FL (ref 80–99.9)
MONOCYTES NFR BLD: 0.35 K/UL (ref 0.1–0.95)
MONOCYTES NFR BLD: 5 % (ref 2–12)
NEUTROPHILS NFR BLD: 85 % (ref 43–80)
NEUTS SEG NFR BLD: 6.52 K/UL (ref 1.8–7.3)
PLATELET # BLD AUTO: 275 K/UL (ref 130–450)
PMV BLD AUTO: 11.7 FL (ref 7–12)
POTASSIUM SERPL-SCNC: 4.7 MMOL/L (ref 3.5–5)
PROT SERPL-MCNC: 5.8 G/DL (ref 6.4–8.3)
RBC # BLD AUTO: 3.24 M/UL (ref 3.8–5.8)
SODIUM SERPL-SCNC: 133 MMOL/L (ref 132–146)
TROPONIN I SERPL HS-MCNC: 2041 NG/L (ref 0–11)
TROPONIN I SERPL HS-MCNC: 2102 NG/L (ref 0–11)
TROPONIN I SERPL HS-MCNC: NORMAL NG/L (ref 0–22)
TROPONIN INTERP: NORMAL
TROPONIN T SERPL-MCNC: NORMAL NG/ML
WBC OTHER # BLD: 7.7 K/UL (ref 4.5–11.5)

## 2024-09-30 PROCEDURE — 93010 ELECTROCARDIOGRAM REPORT: CPT | Performed by: INTERNAL MEDICINE

## 2024-09-30 PROCEDURE — 80053 COMPREHEN METABOLIC PANEL: CPT

## 2024-09-30 PROCEDURE — 2580000003 HC RX 258

## 2024-09-30 PROCEDURE — 2060000000 HC ICU INTERMEDIATE R&B

## 2024-09-30 PROCEDURE — 96365 THER/PROPH/DIAG IV INF INIT: CPT

## 2024-09-30 PROCEDURE — 99285 EMERGENCY DEPT VISIT HI MDM: CPT

## 2024-09-30 PROCEDURE — 71045 X-RAY EXAM CHEST 1 VIEW: CPT

## 2024-09-30 PROCEDURE — 96368 THER/DIAG CONCURRENT INF: CPT

## 2024-09-30 PROCEDURE — 6360000002 HC RX W HCPCS

## 2024-09-30 PROCEDURE — 93005 ELECTROCARDIOGRAM TRACING: CPT

## 2024-09-30 PROCEDURE — 99223 1ST HOSP IP/OBS HIGH 75: CPT | Performed by: INTERNAL MEDICINE

## 2024-09-30 PROCEDURE — 6370000000 HC RX 637 (ALT 250 FOR IP)

## 2024-09-30 PROCEDURE — 6360000002 HC RX W HCPCS: Performed by: INTERNAL MEDICINE

## 2024-09-30 PROCEDURE — 96374 THER/PROPH/DIAG INJ IV PUSH: CPT

## 2024-09-30 PROCEDURE — 96375 TX/PRO/DX INJ NEW DRUG ADDON: CPT

## 2024-09-30 PROCEDURE — APPSS60 APP SPLIT SHARED TIME 46-60 MINUTES: Performed by: PHYSICIAN ASSISTANT

## 2024-09-30 PROCEDURE — 84484 ASSAY OF TROPONIN QUANT: CPT

## 2024-09-30 PROCEDURE — 94640 AIRWAY INHALATION TREATMENT: CPT

## 2024-09-30 PROCEDURE — 96366 THER/PROPH/DIAG IV INF ADDON: CPT

## 2024-09-30 PROCEDURE — 6370000000 HC RX 637 (ALT 250 FOR IP): Performed by: PHYSICIAN ASSISTANT

## 2024-09-30 PROCEDURE — 2500000003 HC RX 250 WO HCPCS

## 2024-09-30 PROCEDURE — 83735 ASSAY OF MAGNESIUM: CPT

## 2024-09-30 PROCEDURE — 85025 COMPLETE CBC W/AUTO DIFF WBC: CPT

## 2024-09-30 RX ORDER — ACETAMINOPHEN 325 MG/1
650 TABLET ORAL EVERY 6 HOURS PRN
Status: DISCONTINUED | OUTPATIENT
Start: 2024-09-30 | End: 2024-10-04 | Stop reason: HOSPADM

## 2024-09-30 RX ORDER — ASPIRIN 81 MG/1
81 TABLET ORAL DAILY
Status: DISCONTINUED | OUTPATIENT
Start: 2024-09-30 | End: 2024-10-04 | Stop reason: HOSPADM

## 2024-09-30 RX ORDER — ACETAMINOPHEN 650 MG/1
650 SUPPOSITORY RECTAL EVERY 6 HOURS PRN
Status: DISCONTINUED | OUTPATIENT
Start: 2024-09-30 | End: 2024-10-04 | Stop reason: HOSPADM

## 2024-09-30 RX ORDER — ADENOSINE 3 MG/ML
12 INJECTION, SOLUTION INTRAVENOUS ONCE
Status: COMPLETED | OUTPATIENT
Start: 2024-09-30 | End: 2024-09-30

## 2024-09-30 RX ORDER — POTASSIUM CHLORIDE 7.45 MG/ML
10 INJECTION INTRAVENOUS PRN
Status: DISCONTINUED | OUTPATIENT
Start: 2024-09-30 | End: 2024-10-04 | Stop reason: HOSPADM

## 2024-09-30 RX ORDER — HEPARIN SODIUM 5000 [USP'U]/ML
5000 INJECTION, SOLUTION INTRAVENOUS; SUBCUTANEOUS EVERY 8 HOURS SCHEDULED
Status: DISCONTINUED | OUTPATIENT
Start: 2024-09-30 | End: 2024-10-04 | Stop reason: HOSPADM

## 2024-09-30 RX ORDER — CALCIUM GLUCONATE 20 MG/ML
1000 INJECTION, SOLUTION INTRAVENOUS ONCE
Status: COMPLETED | OUTPATIENT
Start: 2024-09-30 | End: 2024-09-30

## 2024-09-30 RX ORDER — SODIUM CHLORIDE 9 MG/ML
INJECTION, SOLUTION INTRAVENOUS PRN
Status: DISCONTINUED | OUTPATIENT
Start: 2024-09-30 | End: 2024-10-04 | Stop reason: HOSPADM

## 2024-09-30 RX ORDER — ADENOSINE 3 MG/ML
INJECTION, SOLUTION INTRAVENOUS
Status: COMPLETED
Start: 2024-09-30 | End: 2024-09-30

## 2024-09-30 RX ORDER — MAGNESIUM SULFATE IN WATER 40 MG/ML
2000 INJECTION, SOLUTION INTRAVENOUS ONCE
Status: COMPLETED | OUTPATIENT
Start: 2024-09-30 | End: 2024-09-30

## 2024-09-30 RX ORDER — ADENOSINE 3 MG/ML
6 INJECTION, SOLUTION INTRAVENOUS ONCE
Status: COMPLETED | OUTPATIENT
Start: 2024-09-30 | End: 2024-09-30

## 2024-09-30 RX ORDER — GLUCAGON 1 MG/ML
1 KIT INJECTION PRN
Status: DISCONTINUED | OUTPATIENT
Start: 2024-09-30 | End: 2024-10-04 | Stop reason: HOSPADM

## 2024-09-30 RX ORDER — POTASSIUM CHLORIDE 1500 MG/1
40 TABLET, EXTENDED RELEASE ORAL PRN
Status: DISCONTINUED | OUTPATIENT
Start: 2024-09-30 | End: 2024-10-04 | Stop reason: HOSPADM

## 2024-09-30 RX ORDER — SODIUM CHLORIDE 0.9 % (FLUSH) 0.9 %
5-40 SYRINGE (ML) INJECTION PRN
Status: DISCONTINUED | OUTPATIENT
Start: 2024-09-30 | End: 2024-10-04 | Stop reason: HOSPADM

## 2024-09-30 RX ORDER — DEXTROSE MONOHYDRATE 100 MG/ML
INJECTION, SOLUTION INTRAVENOUS CONTINUOUS PRN
Status: DISCONTINUED | OUTPATIENT
Start: 2024-09-30 | End: 2024-10-04 | Stop reason: HOSPADM

## 2024-09-30 RX ORDER — ATORVASTATIN CALCIUM 40 MG/1
80 TABLET, FILM COATED ORAL DAILY
Status: DISCONTINUED | OUTPATIENT
Start: 2024-09-30 | End: 2024-10-04 | Stop reason: HOSPADM

## 2024-09-30 RX ORDER — MAGNESIUM SULFATE IN WATER 40 MG/ML
2000 INJECTION, SOLUTION INTRAVENOUS PRN
Status: DISCONTINUED | OUTPATIENT
Start: 2024-09-30 | End: 2024-10-04 | Stop reason: HOSPADM

## 2024-09-30 RX ORDER — FENTANYL CITRATE 50 UG/ML
50 INJECTION, SOLUTION INTRAMUSCULAR; INTRAVENOUS ONCE
Status: COMPLETED | OUTPATIENT
Start: 2024-09-30 | End: 2024-09-30

## 2024-09-30 RX ORDER — MIDODRINE HYDROCHLORIDE 10 MG/1
10 TABLET ORAL ONCE
Status: COMPLETED | OUTPATIENT
Start: 2024-09-30 | End: 2024-09-30

## 2024-09-30 RX ORDER — MIDODRINE HYDROCHLORIDE 5 MG/1
5 TABLET ORAL
Status: DISCONTINUED | OUTPATIENT
Start: 2024-09-30 | End: 2024-10-04 | Stop reason: HOSPADM

## 2024-09-30 RX ORDER — POLYETHYLENE GLYCOL 3350 17 G/17G
17 POWDER, FOR SOLUTION ORAL DAILY PRN
Status: DISCONTINUED | OUTPATIENT
Start: 2024-09-30 | End: 2024-10-04 | Stop reason: HOSPADM

## 2024-09-30 RX ORDER — AMIODARONE HYDROCHLORIDE 150 MG/3ML
150 INJECTION, SOLUTION INTRAVENOUS ONCE
Status: DISCONTINUED | OUTPATIENT
Start: 2024-09-30 | End: 2024-09-30

## 2024-09-30 RX ORDER — ETOMIDATE 2 MG/ML
0.1 INJECTION INTRAVENOUS ONCE
Status: COMPLETED | OUTPATIENT
Start: 2024-09-30 | End: 2024-09-30

## 2024-09-30 RX ORDER — CLOPIDOGREL BISULFATE 75 MG/1
75 TABLET ORAL DAILY
Status: DISCONTINUED | OUTPATIENT
Start: 2024-09-30 | End: 2024-10-04 | Stop reason: HOSPADM

## 2024-09-30 RX ORDER — METOPROLOL SUCCINATE 25 MG/1
25 TABLET, EXTENDED RELEASE ORAL DAILY
Status: DISCONTINUED | OUTPATIENT
Start: 2024-09-30 | End: 2024-10-03

## 2024-09-30 RX ORDER — SODIUM CHLORIDE 0.9 % (FLUSH) 0.9 %
5-40 SYRINGE (ML) INJECTION EVERY 12 HOURS SCHEDULED
Status: DISCONTINUED | OUTPATIENT
Start: 2024-09-30 | End: 2024-10-04 | Stop reason: HOSPADM

## 2024-09-30 RX ADMIN — MIDODRINE HYDROCHLORIDE 10 MG: 10 TABLET ORAL at 14:30

## 2024-09-30 RX ADMIN — ADENOSINE 6 MG: 3 INJECTION INTRAVENOUS at 11:00

## 2024-09-30 RX ADMIN — CALCIUM GLUCONATE 1000 MG: 20 INJECTION, SOLUTION INTRAVENOUS at 11:01

## 2024-09-30 RX ADMIN — ARFORMOTEROL TARTRATE: 15 SOLUTION RESPIRATORY (INHALATION) at 20:07

## 2024-09-30 RX ADMIN — IPRATROPIUM BROMIDE 0.5 MG: 0.5 SOLUTION RESPIRATORY (INHALATION) at 20:07

## 2024-09-30 RX ADMIN — METOPROLOL SUCCINATE 25 MG: 25 TABLET, EXTENDED RELEASE ORAL at 14:30

## 2024-09-30 RX ADMIN — MAGNESIUM SULFATE HEPTAHYDRATE 2000 MG: 40 INJECTION, SOLUTION INTRAVENOUS at 12:19

## 2024-09-30 RX ADMIN — ADENOSINE 12 MG: 3 INJECTION, SOLUTION INTRAVENOUS at 11:02

## 2024-09-30 RX ADMIN — FENTANYL CITRATE 50 MCG: 50 INJECTION, SOLUTION INTRAMUSCULAR; INTRAVENOUS at 12:11

## 2024-09-30 RX ADMIN — MIDODRINE HYDROCHLORIDE 5 MG: 5 TABLET ORAL at 17:07

## 2024-09-30 RX ADMIN — ADENOSINE 6 MG: 3 INJECTION INTRAVENOUS at 10:38

## 2024-09-30 RX ADMIN — AMIODARONE HYDROCHLORIDE 150 MG: 50 INJECTION, SOLUTION INTRAVENOUS at 11:41

## 2024-09-30 RX ADMIN — ADENOSINE 6 MG: 3 INJECTION, SOLUTION INTRAVENOUS at 11:00

## 2024-09-30 RX ADMIN — ETOMIDATE INJECTION 6.2 MG: 2 SOLUTION INTRAVENOUS at 12:10

## 2024-09-30 ASSESSMENT — PAIN SCALES - WONG BAKER: WONGBAKER_NUMERICALRESPONSE: NO HURT

## 2024-09-30 ASSESSMENT — PAIN SCALES - GENERAL: PAINLEVEL_OUTOF10: 0

## 2024-09-30 NOTE — ED PROVIDER NOTES
[facility] EMERGENCY DEPARTMENT  EMERGENCY DEPARTMENT ENCOUNTER        Pt Name: Deion Knight  MRN: [id number]  Birthdate 1950  Date of evaluation: 9/30/2024  Provider: Michael Macias DO  PCP: Dean Alanis APRN - CNP  Note Started: 10:52 AM EDT 9/30/24    CHIEF COMPLAINT       No chief complaint on file.      HISTORY OF PRESENT ILLNESS: 1 or more Elements   History From: PATIENT     Limitations to history : None    Deion Knight is a 74 y.o. male with ESRD on dialysis arriving from dialysis after they found him to have an increased HR. His only complaint is mild jaw pain. He appears to be in SVT on his arrival. He gets dialysis on M,W,F and recalls seeing Dr. Joshi with nephrology. He denies any chest pain. Nothing has relieved his symptoms.       Nursing Notes were all reviewed and agreed with or any disagreements were addressed in the HPI.    REVIEW OF SYSTEMS :      Review of Systems    POSITIVE (+): none  NEGATIVE (-): fevers, chills, nausea, vomiting, diarrhea, constipation, shortness of breath, chest pain, abdominal pain      SURGICAL HISTORY     Past Surgical History:   Procedure Laterality Date    CARDIAC PROCEDURE N/A 9/3/2024    Left heart cath / coronary angiography performed by Talha Vu MD at [facility] CARDIAC CATH LAB    CARDIAC PROCEDURE N/A 9/3/2024    Percutaneous coronary intervention performed by Talha Vu MD at [facility] CARDIAC CATH LAB    CARDIAC PROCEDURE N/A 9/3/2024    Insert stent alfie coronary performed by Talha Vu MD at [facility] CARDIAC CATH LAB       CURRENTMEDICATIONS       Previous Medications    ALBUTEROL SULFATE HFA (VENTOLIN HFA) 108 (90 BASE) MCG/ACT INHALER    Inhale 2 puffs into the lungs every 6 hours as needed for Wheezing or Shortness of Breath    ASPIRIN 81 MG EC TABLET    Take 1 tablet by mouth daily    ATORVASTATIN (LIPITOR) 80 MG TABLET    Take 1 tablet by mouth daily    BUDESONIDE-FORMOTEROL (SYMBICORT) 160-4.5 MCG/ACT AERO    Inhale        DISCONTINUED MEDICATIONS:  Discontinued Medications    No medications on file              (Please note that portions of this note were completed with a voice recognition program.  Efforts were made to edit the dictations but occasionally words are mis-transcribed.)    Michael Macias,  PGY-2

## 2024-09-30 NOTE — CONSULTS
NSVT.  9/2/2024 Consult Dr Yayo EMERY for cardiomyopathy, limited by advanced kidney disease.  Found to have NSTEMI --> transferred to [facility] 9/3/2024 [facility] s/p PCI with 2 overlapping MARTIN from mid to the proximal/ostial RCA (see full report below).  9/4/2024 underwent dialysis.  Consider LifeVest.  Discharged home 9/4/2024 on the following cardiac medications: Plavix 75 mg daily, ASA 81 mg daily, Lipitor 80 mg daily, Zetia 10 mg daily, Imdur 30 mg daily, Toprol-XL 50 mg daily.  No Hydralazine noted.      Patient presented to [facility] on September 30, 2024 after he was found to be tachycardic while at HD treatment.    Per patient, over the past 48 hours, he has felt intermittent episodes of lightheadedness and near syncope with worsening dyspnea compared to typical baseline.  They usually resolved spontaneously, however he was noted to have an episode during HD treatment this AM.  His vitals were taken and he was found to be tachycardic --> sent to the ED for evaluation.  Found to be in SVT versus atrial tachycardia/AFl HR in the 190s.  He was hypotensive by with good mentation and in no acute distress.  He was given Adenosine 6 mg x 1, followed by 12 mg x 1 without conversion.  He was then given IV Amiodarone bolus 150 mg x 1 without improvement and continued hypotension.  He then underwent successful cardioversion to SR while in the ED.  He states he is feeling much better s/p cardioversion, with dizziness resolved.  Maintaining SR during my time on the floor.      Notes of chronic GARCIA at baseline.  Worsening peripheral edema bilaterally since most recent discharge.  Denies weight gain, PND, orthopnea.  Denies chest pain, palpitations, syncope.        Compliant with HD treatments, but admits to medication noncompliance and missed doses of most cardiac medication.  States \"it is too much to keep track of\".  Noncompliant with dietary CHF restrictions.        Please note: past medical records were reviewed per  30 MG extended release tablet, Take 1 tablet by mouth daily, Disp: , Rfl:     metoprolol succinate (TOPROL XL) 50 MG extended release tablet, Take 1 tablet by mouth daily, Disp: , Rfl:     Multiple Vitamins-Minerals (THERAPEUTIC MULTIVITAMIN-MINERALS) tablet, Take 1 tablet by mouth daily, Disp: , Rfl:     ALLERGIES:  Patient has no known allergies.    SOCIAL HISTORY:    Lives in a trailer home with his cat.  Has cane PRN for ambulation assistance  Continued tobacco abuse, has smoked 1/2 ppd for 40+ years (at times was smoked 1 ppd)  Denies alcohol or illicit drug use  Activity limited by dyspnea  Cane PRN for ambulation assistance    FAMILY HISTORY:   Non-contributory at this time due to patient's advanced age.       REVIEW OF SYSTEMS:     Negative except as noted above in HPI.      PHYSICAL EXAM:   BP 96/64   Pulse 99   Resp 25   Wt 61.2 kg (135 lb)   SpO2 (!) 82%   BMI 21.79 kg/m²   CONST:  Well developed, disheveled WM who appears stated age. Awake, alert, cooperative, no apparent distress.  HEENT:   Head- Normocephalic, atraumatic.   Eyes- Conjunctivae pink, anicteric.  Neck-  No stridor, trachea midline.  CHEST: Chest symmetrical and non-tender to palpation. No accessory muscle use or intercostal retractions.  RESPIRATORY: Lung sounds - diminished with occasional wheeze.  CARDIOVASCULAR:     Heart Ausculation- Regular rate and rhythm, no apparent murmur.   PV: 1-2+ bilateral lower extremity edema. Pedal pulses palpable, no clubbing or cyanosis.   ABDOMEN: Soft, non-tender to light palpation. Bowel sounds present.   MS: Good muscle strength and tone. No atrophy or abnormal movements.   SKIN: Dry, but cool LE to the touch.  NEURO / PSYCH: Oriented to person, place and time. Speech clear and appropriate. Follows all commands. Pleasant affect.      DATA:    Telemetry: Currently SR HR 90s     Diagnostic:  All diagnostic testing and lab work thus far this admission reviewed in detail.    CXR

## 2024-10-01 PROBLEM — R79.89 ELEVATED TROPONIN: Status: RESOLVED | Noted: 2024-08-21 | Resolved: 2024-10-01

## 2024-10-01 LAB
ALBUMIN SERPL-MCNC: 2.6 G/DL (ref 3.5–5.2)
ALP SERPL-CCNC: 145 U/L (ref 40–129)
ALT SERPL-CCNC: 22 U/L (ref 0–40)
ANION GAP SERPL CALCULATED.3IONS-SCNC: 12 MMOL/L (ref 7–16)
AST SERPL-CCNC: 45 U/L (ref 0–39)
BASOPHILS # BLD: 0.03 K/UL (ref 0–0.2)
BASOPHILS NFR BLD: 0 % (ref 0–2)
BILIRUB SERPL-MCNC: 0.2 MG/DL (ref 0–1.2)
BUN SERPL-MCNC: 13 MG/DL (ref 6–23)
CALCIUM SERPL-MCNC: 8.6 MG/DL (ref 8.6–10.2)
CHLORIDE SERPL-SCNC: 95 MMOL/L (ref 98–107)
CHOLEST SERPL-MCNC: 161 MG/DL
CO2 SERPL-SCNC: 27 MMOL/L (ref 22–29)
CREAT SERPL-MCNC: 2.9 MG/DL (ref 0.7–1.2)
EOSINOPHIL # BLD: 0.06 K/UL (ref 0.05–0.5)
EOSINOPHILS RELATIVE PERCENT: 1 % (ref 0–6)
ERYTHROCYTE [DISTWIDTH] IN BLOOD BY AUTOMATED COUNT: 14 % (ref 11.5–15)
GFR, ESTIMATED: 22 ML/MIN/1.73M2
GLUCOSE SERPL-MCNC: 122 MG/DL (ref 74–99)
HCT VFR BLD AUTO: 31.1 % (ref 37–54)
HDLC SERPL-MCNC: 53 MG/DL
HGB BLD-MCNC: 9.7 G/DL (ref 12.5–16.5)
IMM GRANULOCYTES # BLD AUTO: <0.03 K/UL (ref 0–0.58)
IMM GRANULOCYTES NFR BLD: 0 % (ref 0–5)
LDLC SERPL CALC-MCNC: 80 MG/DL
LYMPHOCYTES NFR BLD: 1.23 K/UL (ref 1.5–4)
LYMPHOCYTES RELATIVE PERCENT: 15 % (ref 20–42)
MAGNESIUM SERPL-MCNC: 2 MG/DL (ref 1.6–2.6)
MCH RBC QN AUTO: 29 PG (ref 26–35)
MCHC RBC AUTO-ENTMCNC: 31.2 G/DL (ref 32–34.5)
MCV RBC AUTO: 93.1 FL (ref 80–99.9)
MONOCYTES NFR BLD: 0.41 K/UL (ref 0.1–0.95)
MONOCYTES NFR BLD: 5 % (ref 2–12)
NEUTROPHILS NFR BLD: 79 % (ref 43–80)
NEUTS SEG NFR BLD: 6.42 K/UL (ref 1.8–7.3)
PHOSPHATE SERPL-MCNC: 3.7 MG/DL (ref 2.5–4.5)
PLATELET # BLD AUTO: 276 K/UL (ref 130–450)
PMV BLD AUTO: 11.1 FL (ref 7–12)
POTASSIUM SERPL-SCNC: 4.4 MMOL/L (ref 3.5–5)
PROCALCITONIN SERPL-MCNC: 0.3 NG/ML (ref 0–0.08)
PROT SERPL-MCNC: 6.3 G/DL (ref 6.4–8.3)
RBC # BLD AUTO: 3.34 M/UL (ref 3.8–5.8)
SODIUM SERPL-SCNC: 134 MMOL/L (ref 132–146)
T4 FREE SERPL-MCNC: 1.5 NG/DL (ref 0.9–1.7)
TRIGL SERPL-MCNC: 140 MG/DL
TSH SERPL DL<=0.05 MIU/L-ACNC: 2.52 UIU/ML (ref 0.27–4.2)
VLDLC SERPL CALC-MCNC: 28 MG/DL
WBC OTHER # BLD: 8.2 K/UL (ref 4.5–11.5)

## 2024-10-01 PROCEDURE — 2060000000 HC ICU INTERMEDIATE R&B

## 2024-10-01 PROCEDURE — 83735 ASSAY OF MAGNESIUM: CPT

## 2024-10-01 PROCEDURE — 84100 ASSAY OF PHOSPHORUS: CPT

## 2024-10-01 PROCEDURE — 85025 COMPLETE CBC W/AUTO DIFF WBC: CPT

## 2024-10-01 PROCEDURE — 80053 COMPREHEN METABOLIC PANEL: CPT

## 2024-10-01 PROCEDURE — 6370000000 HC RX 637 (ALT 250 FOR IP): Performed by: INTERNAL MEDICINE

## 2024-10-01 PROCEDURE — 6370000000 HC RX 637 (ALT 250 FOR IP): Performed by: PHYSICIAN ASSISTANT

## 2024-10-01 PROCEDURE — 36415 COLL VENOUS BLD VENIPUNCTURE: CPT

## 2024-10-01 PROCEDURE — 80061 LIPID PANEL: CPT

## 2024-10-01 PROCEDURE — 2580000003 HC RX 258: Performed by: INTERNAL MEDICINE

## 2024-10-01 PROCEDURE — 6360000002 HC RX W HCPCS: Performed by: INTERNAL MEDICINE

## 2024-10-01 PROCEDURE — 94640 AIRWAY INHALATION TREATMENT: CPT

## 2024-10-01 PROCEDURE — 99232 SBSQ HOSP IP/OBS MODERATE 35: CPT | Performed by: INTERNAL MEDICINE

## 2024-10-01 PROCEDURE — 84443 ASSAY THYROID STIM HORMONE: CPT

## 2024-10-01 PROCEDURE — 84145 PROCALCITONIN (PCT): CPT

## 2024-10-01 PROCEDURE — 84439 ASSAY OF FREE THYROXINE: CPT

## 2024-10-01 RX ORDER — ISOSORBIDE MONONITRATE 30 MG/1
30 TABLET, EXTENDED RELEASE ORAL DAILY
Status: DISCONTINUED | OUTPATIENT
Start: 2024-10-01 | End: 2024-10-04 | Stop reason: HOSPADM

## 2024-10-01 RX ORDER — BUPROPION HYDROCHLORIDE 150 MG/1
300 TABLET ORAL EVERY MORNING
Status: DISCONTINUED | OUTPATIENT
Start: 2024-10-01 | End: 2024-10-04 | Stop reason: HOSPADM

## 2024-10-01 RX ORDER — PANTOPRAZOLE SODIUM 40 MG/1
40 TABLET, DELAYED RELEASE ORAL DAILY
Status: DISCONTINUED | OUTPATIENT
Start: 2024-10-01 | End: 2024-10-04 | Stop reason: HOSPADM

## 2024-10-01 RX ADMIN — Medication 10 ML: at 21:08

## 2024-10-01 RX ADMIN — MIDODRINE HYDROCHLORIDE 5 MG: 5 TABLET ORAL at 17:00

## 2024-10-01 RX ADMIN — Medication 10 ML: at 02:06

## 2024-10-01 RX ADMIN — IPRATROPIUM BROMIDE 0.5 MG: 0.5 SOLUTION RESPIRATORY (INHALATION) at 22:38

## 2024-10-01 RX ADMIN — HEPARIN SODIUM 5000 UNITS: 5000 INJECTION INTRAVENOUS; SUBCUTANEOUS at 13:09

## 2024-10-01 RX ADMIN — ATORVASTATIN CALCIUM 80 MG: 40 TABLET, FILM COATED ORAL at 08:43

## 2024-10-01 RX ADMIN — ARFORMOTEROL TARTRATE: 15 SOLUTION RESPIRATORY (INHALATION) at 18:21

## 2024-10-01 RX ADMIN — IPRATROPIUM BROMIDE 0.5 MG: 0.5 SOLUTION RESPIRATORY (INHALATION) at 06:14

## 2024-10-01 RX ADMIN — ASPIRIN 81 MG: 81 TABLET, COATED ORAL at 08:44

## 2024-10-01 RX ADMIN — CLOPIDOGREL BISULFATE 75 MG: 75 TABLET ORAL at 08:44

## 2024-10-01 RX ADMIN — MIDODRINE HYDROCHLORIDE 5 MG: 5 TABLET ORAL at 08:46

## 2024-10-01 RX ADMIN — Medication 10 ML: at 10:00

## 2024-10-01 RX ADMIN — HEPARIN SODIUM 5000 UNITS: 5000 INJECTION INTRAVENOUS; SUBCUTANEOUS at 06:25

## 2024-10-01 RX ADMIN — ARFORMOTEROL TARTRATE: 15 SOLUTION RESPIRATORY (INHALATION) at 06:13

## 2024-10-01 RX ADMIN — HEPARIN SODIUM 5000 UNITS: 5000 INJECTION INTRAVENOUS; SUBCUTANEOUS at 00:29

## 2024-10-01 RX ADMIN — METOPROLOL SUCCINATE 25 MG: 25 TABLET, EXTENDED RELEASE ORAL at 12:59

## 2024-10-01 RX ADMIN — HEPARIN SODIUM 5000 UNITS: 5000 INJECTION INTRAVENOUS; SUBCUTANEOUS at 21:05

## 2024-10-01 RX ADMIN — IPRATROPIUM BROMIDE 0.5 MG: 0.5 SOLUTION RESPIRATORY (INHALATION) at 18:20

## 2024-10-01 RX ADMIN — MIDODRINE HYDROCHLORIDE 5 MG: 5 TABLET ORAL at 13:03

## 2024-10-01 ASSESSMENT — PAIN SCALES - GENERAL
PAINLEVEL_OUTOF10: 0

## 2024-10-01 NOTE — PLAN OF CARE
Problem: Discharge Planning  Goal: Discharge to home or other facility with appropriate resources  Outcome: Progressing  Flowsheets  Taken 10/1/2024 0010  Discharge to home or other facility with appropriate resources:   Identify barriers to discharge with patient and caregiver   Arrange for needed discharge resources and transportation as appropriate   Identify discharge learning needs (meds, wound care, etc)   Refer to discharge planning if patient needs post-hospital services based on physician order or complex needs related to functional status, cognitive ability or social support system  Taken 9/30/2024 2334  Discharge to home or other facility with appropriate resources:   Identify barriers to discharge with patient and caregiver   Arrange for needed discharge resources and transportation as appropriate   Identify discharge learning needs (meds, wound care, etc)   Refer to discharge planning if patient needs post-hospital services based on physician order or complex needs related to functional status, cognitive ability or social support system     Problem: Safety - Adult  Goal: Free from fall injury  Outcome: Progressing

## 2024-10-01 NOTE — CONSULTS
[address] [address]                              CONSULTATION      PATIENT NAME: MARIANNE HUBBARD                   : 1950  MED REC NO: [id number]                        ROOM: [id number]  ACCOUNT NO: [id number]                       ADMIT DATE: 2024  PROVIDER: Gómez Hammonds MD      CONSULT DATE: 10/01/2024    REFERRING PHYSICIAN:  Dr. Du    REASON FOR CONSULTATION:  Chronic kidney disease.    HISTORY OF PRESENT ILLNESS:  The patient is being seen in consultation at the request of Dr. Du.  The patient is a 74-year-old gentleman, known to me from recent hospitalization.  He has underlying history of chronic kidney disease stage G4, with recent acute kidney injury requiring initiation of the patient on renal replacement therapy starting on hemodialysis on 2024.  The patient had his regular hemodialysis treatment yesterday and presented to the emergency room at [facility] with having tachycardia, which was observed post dialysis.  Since starting dialysis, he has had increased appetite.  His dry weight was recently increased.  On 2024, the patient left hemodialysis with a weight of 59.6 kg and came back with a weight of 63.1 kg having gained 3.5 kg over 2 days.  He was taken down only to 61.5 kg yesterday with a net fluid removal of 1600 mL.  The patient states that last week he had symptoms of dizziness and lightheadedness, profound weakness on , whereas his last hemodialysis was 2 days prior to that day.  When seen up on the floor, the patient is feeling better.  Cardiology has been consulted.  The patient does have a history of atherosclerotic heart disease and underwent a cardiac catheterization on September 3, 2024, with stent placement and does have a prior history of nonsustained ventricular tachycardia.  In the emergency room, the patient's heart rate was noted to  be in the 180s to 190s.  The patient did receive amiodarone IV bolus.  When seen up on the floor, the patient has no specific complaints.    PAST MEDICAL HISTORY:  Significant for chronic kidney disease stage G4 with recent acute kidney injury with no recovery of renal function.  Baseline creatinine is unknown.  In addition, the patient has history of atherosclerotic heart disease status post coronary artery stent placement, nephrolithiasis, hyperlipidemia, peptic ulcer disease, hypertension, history of nonsustained ventricular tachycardia, bilateral pleural effusions, anemia of chronic kidney disease, type 2 diabetes mellitus, COPD with continued smoking, gastroesophageal reflux disease.    PAST SURGICAL HISTORY:  Significant for coronary artery catheterization and stent placement.    SOCIAL HISTORY:  The patient is single.  He lives by himself.  He smokes half to one pack of cigarettes a day for the last 20 years.  Denies any alcohol use.    FAMILY HISTORY:  Positive for hypertension.    REVIEW OF SYSTEMS:  Negative for fever or chills.  He has symptoms of fatigue and weakness, sometimes with lightheadedness, which is not temporarily related to dialysis.  No chest pain or palpitations.  No abdominal pain, constipation, or diarrhea.  No dysuria, hesitancy, urgency, increased or decreased frequency of micturition.    ALLERGIES:  THE PATIENT HAS NO KNOWN DRUG ALLERGIES.      CURRENT MEDICATIONS:  The patient is on arformoterol and budesonide nebulization twice a day, aspirin 81 mg daily, Lipitor 80 mg daily.  The patient has received 1 amp of calcium gluconate, clopidogrel that is Plavix 75 mg daily, etomidate injection given once, fentanyl given once, heparin 5000 units subcutaneously q.8 hours, Atrovent inhaler q.6 hours, magnesium sulfate 2 g given intravenously once, metoprolol succinate 25 mg daily, midodrine 10 mg given once and 5 mg t.i.d.    PHYSICAL EXAMINATION:  GENERAL:  The patient is awake and alert.  He

## 2024-10-01 NOTE — PROGRESS NOTES
INPATIENT CARDIOLOGY FOLLOW-UP    Name: Deion Knight    Age: 74 y.o.    Date of Admission: 9/30/2024 10:26 AM    Date of Service: 10/1/2024    Chief Complaint: Follow-up for SVT    Interim History:  No new overnight cardiac complaints.  Patient told me he had elevated dyspnea this morning when he got up went to bed after that no further episodes of dyspnea.  Patient would like to go home.  No further episodes of palpitations or SVT.  Currently with no complaints of CP, palpitations, dizziness, or lightheadedness. SR on telemetry.    Review of Systems:   Cardiac: As per HPI  General: No fever, chills  Pulmonary: As per HPI  HEENT: No visual disturbances, difficult swallowing  GI: No nausea, vomiting  Endocrine: No thyroid disease or DM  Musculoskeletal: VERDUGO x 4, no focal motor deficits  Skin: Intact, no rashes  Neuro/Psych: No headache or seizures    Problem List:  Patient Active Problem List   Diagnosis    ANGIE (acute kidney injury) ([facility])    Acute renal failure (ARF) ([facility])    Acute decompensated heart failure ([facility])    Shortness of breath    NICM (nonischemic cardiomyopathy) ([facility])    Elevated troponin    Moderate protein-calorie malnutrition ([facility])    CAD (coronary artery disease)    NSTEMI (non-ST elevated myocardial infarction) ([facility])    SVT (supraventricular tachycardia) ([facility])       Allergies:  No Known Allergies    Current Medications:  Current Facility-Administered Medications   Medication Dose Route Frequency Provider Last Rate Last Admin    [Held by provider] buPROPion (WELLBUTRIN XL) extended release tablet 300 mg  300 mg Oral QAM Omar, Ismail U, DO        [Held by provider] isosorbide mononitrate (IMDUR) extended release tablet 30 mg  30 mg Oral Daily Omar, Ismail U, DO        [Held by provider] pantoprazole (PROTONIX) tablet 40 mg  40 mg Oral Daily Omar, Ismail U, DO        metoprolol succinate (TOPROL XL) extended release tablet 25 mg  25 mg Oral Daily Jazmine Gaxiola PA-C   25 mg at 10/01/24 4827

## 2024-10-01 NOTE — PROGRESS NOTES
Department of Internal Medicine  PN    PCP: Dean Alanis APRN - CNP  Admitting Physician: Dr. Du  Consultants:  Date of Service: 9/30/2024    CHIEF COMPLAINT: Elevated heart rate    HISTORY OF PRESENT ILLNESS:    Patient is a 74-year-old male who presented to the ED due to elevated heart rate.  Patient had just finished dialysis when he had significant lightheadedness and shortness of breath.  He was found to have elevated heart rate and sent to the ED for further evaluation management.  States that he has had symptoms on and off for last 2 3 weeks.  As mentioned he has associated lightheadedness and shortness of breath. He denies any chest pain.  Denies any fever or chills.    10/1/2024  Patient seen examined on PCU.  Patient denies any current chest pain, abdominal pain, nausea or vomiting and his breathing is much improved.  Patient stayed in a sinus rhythm WBC 8.2 with hemoglobin 9.7 this morning.  Temperature ranges 97.3-99.3.  Heart rate 75 with a blood pressure 105/70.  O2 sat 99% on room air at rest.  Chest x-ray on admission showed mild cardiomegaly with mild CHF.  Pending cardiology and nephrology evaluation today    PAST MEDICAL Hx:  No past medical history on file.    PAST SURGICAL Hx:   Past Surgical History:   Procedure Laterality Date    CARDIAC PROCEDURE N/A 9/3/2024    Left heart cath / coronary angiography performed by Talha Vu MD at [facility] [place]    CARDIAC PROCEDURE N/A 9/3/2024    Percutaneous coronary intervention performed by Talha Vu MD at [facility] [place] [facility]ARDI[facility] C[facility]    CARDIAC PROCEDURE N/A 9/3/2024    Insert stent alfie coronary performed by Talha Vu MD at [facility] [place] [facility]ARDI[facility]       FAMILY Hx:  No family history on file.    HOME MEDICATIONS:  Prior to Admission medications    Medication Sig Start Date End Date Taking? Authorizing Provider   Vitamin D (CHOLECALCIFEROL) 50 MCG (2000 UT) TABS tablet Take 1 tablet by mouth daily 9/5/24  Yes Zoey Kendall MD  reviewed  Nephrology consult    CMP, CBC in a.caleb Du DO  9:31 AM  10/1/2024

## 2024-10-01 NOTE — CONSULTS
Patient seen and examined.    Consult dictated.  Patient is a 74-year-old gentleman with underlying history of chronic kidney disease stage G4 with recent acute kidney injury starting hemodialysis in the last week of August 2024.  Patient now presents with supraventricular tachycardia and hypotension postdialysis.  Only 1.6 L was taken off yesterday.  Patient also gives history of similar symptoms with the dizziness and lightheadedness and profound weakness almost 48 hours removed from his hemodialysis last week.  He experienced the symptoms on Sunday whereas his last hemodialysis was on the Friday before.  So the symptoms may not be all volume mediated.  Nevertheless we will increase his dry weight on dialysis.  Follow the patient for dialytic support.              Dr. Du, Mauro YAO, DO  , Thank You for allowing me to participate in the care of this patient.  Will follow the patient with you.    Gómez Hammonds MD  Nephrology    Electronically signed by Gómez Hammonds MD on 10/1/2024 at 12:12 PM

## 2024-10-01 NOTE — H&P
Department of Internal Medicine  History and Physical    PCP: Dean Alanis APRN - CNP  Admitting Physician: Dr. Du  Consultants:  Date of Service: 9/30/2024    CHIEF COMPLAINT: Elevated heart rate    HISTORY OF PRESENT ILLNESS:    Patient is a 74-year-old male who presented to the ED due to elevated heart rate.  Patient had just finished dialysis when he had significant lightheadedness and shortness of breath.  He was found to have elevated heart rate and sent to the ED for further evaluation management.  States that he has had symptoms on and off for last 2 3 weeks.  As mentioned he has associated lightheadedness and shortness of breath. He denies any chest pain.  Denies any fever or chills.    PAST MEDICAL Hx:  No past medical history on file.    PAST SURGICAL Hx:   Past Surgical History:   Procedure Laterality Date    CARDIAC PROCEDURE N/A 9/3/2024    Left heart cath / coronary angiography performed by Talha Vu MD at [facility] [place] CARDIAC CATH LAB    CARDIAC PROCEDURE N/A 9/3/2024    Percutaneous coronary intervention performed by Talha Vu MD at [facility] [place] CARDIAC CATH LAB    CARDIAC PROCEDURE N/A 9/3/2024    Insert stent alfie coronary performed by Talha Vu MD at [facility] [place] CARDIAC CATH LAB       FAMILY Hx:  No family history on file.    HOME MEDICATIONS:  Prior to Admission medications    Medication Sig Start Date End Date Taking? Authorizing Provider   Vitamin D (CHOLECALCIFEROL) 50 MCG (2000 UT) TABS tablet Take 1 tablet by mouth daily 9/5/24  Yes Zoey Kendall MD   tiotropium (SPIRIVA RESPIMAT) 2.5 MCG/ACT AERS inhaler Inhale 2 puffs into the lungs daily 8/31/24  Yes Mauro Du DO   albuterol sulfate HFA (VENTOLIN HFA) 108 (90 Base) MCG/ACT inhaler Inhale 2 puffs into the lungs every 6 hours as needed for Wheezing or Shortness of Breath   Yes ProviderDebbie MD   aspirin 81 MG EC tablet Take 1 tablet by mouth daily   Yes Debbie Mohan MD   budesonide-formoterol (SYMBICORT) 160-4.5

## 2024-10-02 LAB
ALBUMIN SERPL-MCNC: 2.5 G/DL (ref 3.5–5.2)
ALP SERPL-CCNC: 126 U/L (ref 40–129)
ALT SERPL-CCNC: 18 U/L (ref 0–40)
ANION GAP SERPL CALCULATED.3IONS-SCNC: 10 MMOL/L (ref 7–16)
AST SERPL-CCNC: 27 U/L (ref 0–39)
BASOPHILS # BLD: 0.03 K/UL (ref 0–0.2)
BASOPHILS NFR BLD: 0 % (ref 0–2)
BILIRUB SERPL-MCNC: 0.2 MG/DL (ref 0–1.2)
BUN SERPL-MCNC: 18 MG/DL (ref 6–23)
CALCIUM SERPL-MCNC: 8.1 MG/DL (ref 8.6–10.2)
CHLORIDE SERPL-SCNC: 97 MMOL/L (ref 98–107)
CO2 SERPL-SCNC: 27 MMOL/L (ref 22–29)
CREAT SERPL-MCNC: 3.8 MG/DL (ref 0.7–1.2)
EOSINOPHIL # BLD: 0.09 K/UL (ref 0.05–0.5)
EOSINOPHILS RELATIVE PERCENT: 1 % (ref 0–6)
ERYTHROCYTE [DISTWIDTH] IN BLOOD BY AUTOMATED COUNT: 14.2 % (ref 11.5–15)
GFR, ESTIMATED: 16 ML/MIN/1.73M2
GLUCOSE SERPL-MCNC: 127 MG/DL (ref 74–99)
HAV IGM SERPL QL IA: NONREACTIVE
HBV CORE IGM SERPL QL IA: NONREACTIVE
HBV SURFACE AG SERPL QL IA: NONREACTIVE
HCT VFR BLD AUTO: 29.8 % (ref 37–54)
HCV AB SERPL QL IA: NONREACTIVE
HGB BLD-MCNC: 9.4 G/DL (ref 12.5–16.5)
IMM GRANULOCYTES # BLD AUTO: 0.03 K/UL (ref 0–0.58)
IMM GRANULOCYTES NFR BLD: 0 % (ref 0–5)
LYMPHOCYTES NFR BLD: 1.02 K/UL (ref 1.5–4)
LYMPHOCYTES RELATIVE PERCENT: 15 % (ref 20–42)
MCH RBC QN AUTO: 29.6 PG (ref 26–35)
MCHC RBC AUTO-ENTMCNC: 31.5 G/DL (ref 32–34.5)
MCV RBC AUTO: 93.7 FL (ref 80–99.9)
MONOCYTES NFR BLD: 0.43 K/UL (ref 0.1–0.95)
MONOCYTES NFR BLD: 6 % (ref 2–12)
NEUTROPHILS NFR BLD: 77 % (ref 43–80)
NEUTS SEG NFR BLD: 5.27 K/UL (ref 1.8–7.3)
PLATELET # BLD AUTO: 235 K/UL (ref 130–450)
PMV BLD AUTO: 11.3 FL (ref 7–12)
POTASSIUM SERPL-SCNC: 3.8 MMOL/L (ref 3.5–5)
PROT SERPL-MCNC: 5.5 G/DL (ref 6.4–8.3)
RBC # BLD AUTO: 3.18 M/UL (ref 3.8–5.8)
SODIUM SERPL-SCNC: 134 MMOL/L (ref 132–146)
WBC OTHER # BLD: 6.9 K/UL (ref 4.5–11.5)

## 2024-10-02 PROCEDURE — 6370000000 HC RX 637 (ALT 250 FOR IP): Performed by: INTERNAL MEDICINE

## 2024-10-02 PROCEDURE — 2700000000 HC OXYGEN THERAPY PER DAY

## 2024-10-02 PROCEDURE — 97165 OT EVAL LOW COMPLEX 30 MIN: CPT

## 2024-10-02 PROCEDURE — 94640 AIRWAY INHALATION TREATMENT: CPT

## 2024-10-02 PROCEDURE — 6360000002 HC RX W HCPCS: Performed by: INTERNAL MEDICINE

## 2024-10-02 PROCEDURE — 99232 SBSQ HOSP IP/OBS MODERATE 35: CPT | Performed by: INTERNAL MEDICINE

## 2024-10-02 PROCEDURE — 2060000000 HC ICU INTERMEDIATE R&B

## 2024-10-02 PROCEDURE — 90935 HEMODIALYSIS ONE EVALUATION: CPT

## 2024-10-02 PROCEDURE — 36415 COLL VENOUS BLD VENIPUNCTURE: CPT

## 2024-10-02 PROCEDURE — 6370000000 HC RX 637 (ALT 250 FOR IP): Performed by: PHYSICIAN ASSISTANT

## 2024-10-02 PROCEDURE — 2580000003 HC RX 258: Performed by: INTERNAL MEDICINE

## 2024-10-02 PROCEDURE — 80053 COMPREHEN METABOLIC PANEL: CPT

## 2024-10-02 PROCEDURE — 5A1D70Z PERFORMANCE OF URINARY FILTRATION, INTERMITTENT, LESS THAN 6 HOURS PER DAY: ICD-10-PCS | Performed by: INTERNAL MEDICINE

## 2024-10-02 PROCEDURE — 80074 ACUTE HEPATITIS PANEL: CPT

## 2024-10-02 PROCEDURE — 85025 COMPLETE CBC W/AUTO DIFF WBC: CPT

## 2024-10-02 RX ORDER — METOPROLOL SUCCINATE 50 MG/1
25 TABLET, EXTENDED RELEASE ORAL DAILY
COMMUNITY
Start: 2024-10-02 | End: 2024-10-04

## 2024-10-02 RX ORDER — MIDODRINE HYDROCHLORIDE 5 MG/1
5 TABLET ORAL
Qty: 90 TABLET | Refills: 3 | Status: ON HOLD | OUTPATIENT
Start: 2024-10-02

## 2024-10-02 RX ADMIN — EPOETIN ALFA-EPBX 5000 UNITS: 3000 INJECTION, SOLUTION INTRAVENOUS; SUBCUTANEOUS at 10:19

## 2024-10-02 RX ADMIN — Medication 10 ML: at 08:23

## 2024-10-02 RX ADMIN — HEPARIN SODIUM 5000 UNITS: 5000 INJECTION INTRAVENOUS; SUBCUTANEOUS at 21:19

## 2024-10-02 RX ADMIN — IPRATROPIUM BROMIDE 0.5 MG: 0.5 SOLUTION RESPIRATORY (INHALATION) at 07:46

## 2024-10-02 RX ADMIN — ARFORMOTEROL TARTRATE: 15 SOLUTION RESPIRATORY (INHALATION) at 07:46

## 2024-10-02 RX ADMIN — Medication 10 ML: at 21:19

## 2024-10-02 RX ADMIN — IPRATROPIUM BROMIDE 0.5 MG: 0.5 SOLUTION RESPIRATORY (INHALATION) at 13:44

## 2024-10-02 RX ADMIN — METOPROLOL SUCCINATE 25 MG: 25 TABLET, EXTENDED RELEASE ORAL at 08:24

## 2024-10-02 RX ADMIN — ARFORMOTEROL TARTRATE: 15 SOLUTION RESPIRATORY (INHALATION) at 18:03

## 2024-10-02 RX ADMIN — HEPARIN SODIUM 5000 UNITS: 5000 INJECTION INTRAVENOUS; SUBCUTANEOUS at 05:57

## 2024-10-02 RX ADMIN — MIDODRINE HYDROCHLORIDE 5 MG: 5 TABLET ORAL at 16:26

## 2024-10-02 RX ADMIN — HEPARIN SODIUM 5000 UNITS: 5000 INJECTION INTRAVENOUS; SUBCUTANEOUS at 13:25

## 2024-10-02 RX ADMIN — ASPIRIN 81 MG: 81 TABLET, COATED ORAL at 08:23

## 2024-10-02 RX ADMIN — MIDODRINE HYDROCHLORIDE 5 MG: 5 TABLET ORAL at 08:23

## 2024-10-02 RX ADMIN — IPRATROPIUM BROMIDE 0.5 MG: 0.5 SOLUTION RESPIRATORY (INHALATION) at 18:03

## 2024-10-02 RX ADMIN — ATORVASTATIN CALCIUM 80 MG: 40 TABLET, FILM COATED ORAL at 08:23

## 2024-10-02 RX ADMIN — CLOPIDOGREL BISULFATE 75 MG: 75 TABLET ORAL at 08:22

## 2024-10-02 RX ADMIN — IPRATROPIUM BROMIDE 0.5 MG: 0.5 SOLUTION RESPIRATORY (INHALATION) at 23:20

## 2024-10-02 ASSESSMENT — PAIN SCALES - GENERAL
PAINLEVEL_OUTOF10: 0

## 2024-10-02 NOTE — PROGRESS NOTES
Department of Internal Medicine  PN    PCP: Dean Alanis APRN - CNP  Admitting Physician: Dr. Du  Consultants:  Date of Service: 9/30/2024    CHIEF COMPLAINT: Elevated heart rate    HISTORY OF PRESENT ILLNESS:    Patient is a 74-year-old male who presented to the ED due to elevated heart rate.  Patient had just finished dialysis when he had significant lightheadedness and shortness of breath.  He was found to have elevated heart rate and sent to the ED for further evaluation management.  States that he has had symptoms on and off for last 2 3 weeks.  As mentioned he has associated lightheadedness and shortness of breath. He denies any chest pain.  Denies any fever or chills.    10/1/2024  Patient seen examined on PCU.  Patient denies any current chest pain, abdominal pain, nausea or vomiting and his breathing is much improved.  Patient stayed in a sinus rhythm WBC 8.2 with hemoglobin 9.7 this morning.  Temperature ranges 97.3-99.3.  Heart rate 75 with a blood pressure 105/70.  O2 sat 99% on room air at rest.  Chest x-ray on admission showed mild cardiomegaly with mild CHF.  Pending cardiology and nephrology evaluation today    10/2/2024  Patient seen examined on PCU.  Patient is seen in dialysis.  Patient's had no more episodes of SVT.  Patient denies any dizziness with standing or shortness of breath walking in the room.  BUN/creatinine was 18/3.8.  Transaminase normal with a WBC 6.9 hemoglobin 9.4.  Temperature 97.4 with heart rate 80 and blood pressure 113/81.  O2 sat 98% room air at rest.  Case discussed with Dr. Main the cardiologist.  The patient is to ambulate in the hallway today to see if he gets short of breath or induce a episode of SVT.  If patient does well with ambulation later today he would be stable for discharge home today.    PAST MEDICAL Hx:  No past medical history on file.    PAST SURGICAL Hx:   Past Surgical History:   Procedure Laterality Date    CARDIAC PROCEDURE N/A 9/3/2024    Left  no murmur noted    ABDOMEN:    No scars, normal bowel sounds, soft, non-distended, non-tender, no masses palpated, no hepatosplenomegaly, no rebound or guarding elicited on palpation     MUSCULOSKELETAL:    There is no redness, warmth, or swelling of the joints.  Full range of motion noted.  Motor strength is 5 out of 5 all extremities bilaterally.  Tone is normal.    NEUROLOGIC:    Awake, alert, oriented to name, place and time.  Cranial nerves II-XII are grossly intact.  Motor is 5 out of 5 bilaterally.      SKIN:    No bruising or bleeding.  No redness, warmth, or swelling    EXTREMITIES:    Peripheral pulses present.  No edema, cyanosis, or swelling.    LINES/CATHETERS     LABORATORY DATA:  CBC with Differential:    Lab Results   Component Value Date/Time    WBC 6.9 10/02/2024 07:25 AM    RBC 3.18 10/02/2024 07:25 AM    HGB 9.4 10/02/2024 07:25 AM    HCT 29.8 10/02/2024 07:25 AM     10/02/2024 07:25 AM    MCV 93.7 10/02/2024 07:25 AM    MCH 29.6 10/02/2024 07:25 AM    MCHC 31.5 10/02/2024 07:25 AM    RDW 14.2 10/02/2024 07:25 AM    LYMPHOPCT 15 10/02/2024 07:25 AM    MONOPCT 6 10/02/2024 07:25 AM    EOSPCT 1 10/02/2024 07:25 AM    BASOPCT 0 10/02/2024 07:25 AM    MONOSABS 0.43 10/02/2024 07:25 AM    LYMPHSABS 1.02 10/02/2024 07:25 AM    EOSABS 0.09 10/02/2024 07:25 AM    BASOSABS 0.03 10/02/2024 07:25 AM     CMP:    Lab Results   Component Value Date/Time     10/02/2024 07:25 AM    K 3.8 10/02/2024 07:25 AM    CL 97 10/02/2024 07:25 AM    CO2 27 10/02/2024 07:25 AM    BUN 18 10/02/2024 07:25 AM    CREATININE 3.8 10/02/2024 07:25 AM    LABGLOM 16 10/02/2024 07:25 AM    GLUCOSE 127 10/02/2024 07:25 AM    CALCIUM 8.1 10/02/2024 07:25 AM    BILITOT 0.2 10/02/2024 07:25 AM    ALKPHOS 126 10/02/2024 07:25 AM    AST 27 10/02/2024 07:25 AM    ALT 18 10/02/2024 07:25 AM       ASSESSMENT/PLAN:  Acute paroxysmal SVT  Acute on chronic normocytic anemia   ESRD on dialysis   Coronary artery disease status post

## 2024-10-02 NOTE — FLOWSHEET NOTE
10/02/24 1255   Vital Signs   BP (!) 141/79   Temp 97.3 °F (36.3 °C)   Pulse 88   Respirations 16   Weight - Scale 59.5 kg (131 lb 2.8 oz)   Weight Method Bed scale   Percent Weight Change -1.67   Pain Assessment   Pain Assessment None - Denies Pain   Post-Hemodialysis Assessment   Post-Treatment Procedures Blood returned;Catheter capped, clamped and heparinized x 2 ports   Machine Disinfection Process Exterior Machine Disinfection;Bleach;Acid/Vinegar Clean   Rinseback Volume (ml) 300 ml   Dialyzer Clearance Lightly streaked   Duration of Treatment (minutes) 210 minutes   Hemodialysis Intake (ml) 300 ml   Hemodialysis Output (ml) 1300 ml   NET Removed (ml) 1000   Tolerated Treatment Good   Bilateral Breath Sounds Diminished   Edema Right lower extremity;Left lower extremity   RLE Edema Trace   LLE Edema Trace   Time Off 1240   Patient Disposition Return to room   Observations & Evaluations   Level of Consciousness 0   Oriented X 3   Heart Rhythm Regular   Respiratory Quality/Effort Unlabored   O2 Device None (Room air)   Skin Color Pink   Skin Condition/Temp Dry;Warm   Abdomen Inspection Soft   Comments tolerated well, net UF 1 L   Access   Add Access? Yes  (R chest TDC - dressing changed.)

## 2024-10-02 NOTE — PROGRESS NOTES
OCCUPATIONAL THERAPY INITIAL EVALUATION    [facility]  [address] [place]. [place]        Date:10/2/2024                                                  Patient Name: Deion Knight    MRN: [id number]    : 1950    Room: [address] [place] [address]      Evaluating OT: Tatiana Martinez OTR/L #[id number]     Referring Provider and Specific Provider Orders/Date:      10/01/24 113  OT eval and treat  Start:  10/01/24 1130,   End:  10/01/24 1130,   ONE TIME,   Standing Count:  1 Occurrences,   R         Mauro Du A, DO      Placement Recommendation: Home with Home Health OT if patient is able to meet goals         Diagnosis:   1. Paroxysmal supraventricular tachycardia (HCC)    2. Elevated troponin    3. ESRD (end stage renal disease) (HCC)    4. Anemia of chronic disease    5. Demand ischemia (HCC)    6. SVT (supraventricular tachycardia) (HCC)         Surgery: None       Pertinent Medical History:     No past medical history on file.      Past Surgical History:   Procedure Laterality Date    CARDIAC PROCEDURE N/A 9/3/2024    Left heart cath / coronary angiography performed by Talha Vu MD [place] [facility] [place]    CARDIAC PROCEDURE N/A 9/3/2024    Percutaneous coronary intervention performed by Talha Vu MD at [facility] [place] [place] [facility]    CARDIAC PROCEDURE N/A 9/3/2024    Insert stent alfie coronary performed by Talha Vu MD at [facility] [place] [facility] [place] [facility]      Precautions:  Fall Risk, alarm     Assessment of current deficits    [x] Functional mobility  [x]ADLs  [x] Strength               []Cognition    [x] Functional transfers   [x] IADLs         [x] Safety Awareness   [x]Endurance    [] Fine Coordination              [x] Balance      [] Vision/perception   []Sensation     []Gross Motor Coordination  [] ROM  [] Delirium                   [] Motor Control     OT PLAN OF CARE   OT POC based on physician orders, patient diagnosis and results of clinical

## 2024-10-02 NOTE — ACP (ADVANCE CARE PLANNING)
Advance Care Planning   Healthcare Decision Maker:    Primary Decision Maker: Dawn David - Brother/Sister - [phone]

## 2024-10-02 NOTE — PROGRESS NOTES
Gómez Hammonds MD  Nephrology    Hemodialysis/Progress note.    Patient seen and examined on hemodialyisis.  Chart reviewed.  Cardiology notes reviewed.  Feels better.  Denies any shortness of breath chest pain or palpitations.  Pulse oxygenation is 98% on room air.  Appetite his GERD.  No nausea or dysguesia.  Awake and alert . In no acute distress.    Vital SignsBP 113/81   Pulse 80   Temp 97.4 °F (36.3 °C) (Oral)   Resp 17   Ht 1.702 m (5' 7\")   Wt 60.5 kg (133 lb 6.4 oz)   SpO2 97%   BMI 20.89 kg/m²   24HR INTAKE/OUTPUT:    Intake/Output Summary (Last 24 hours) at 10/2/2024 1410  Last data filed at 10/2/2024 1255  Gross per 24 hour   Intake 300 ml   Output 1300 ml   Net -1000 ml          Physical  Exam      Neck: No JVD  Chest: A tunneled  hemodialysis catheter is present in the right infraclavicular region with no drainage at the exit site.  Lungs: Breath sounds decreased at the bases. No rales or ronchi.  Heart: Irregularly irregular rate and rhythm. No S3 gallop. No murmrur.  Abdomen: Soft non distended, non tender and normal bowel sounds.  Extremeties: No edema.        Medications:  Current Facility-Administered Medications   Medication Dose Route Frequency Provider Last Rate Last Admin    [Held by provider] buPROPion (WELLBUTRIN XL) extended release tablet 300 mg  300 mg Oral QAM Omar, Ismail U, DO        [Held by provider] isosorbide mononitrate (IMDUR) extended release tablet 30 mg  30 mg Oral Daily Omar, Ismail U, DO        [Held by provider] pantoprazole (PROTONIX) tablet 40 mg  40 mg Oral Daily Omar, Ismail U, DO        metoprolol succinate (TOPROL XL) extended release tablet 25 mg  25 mg Oral Daily Jazmine Gaxiola PA-TIMOTHY   25 mg at 10/02/24 0824    midodrine (PROAMATINE) tablet 5 mg  5 mg Oral TID  Jazmine Gaxiola PA-C   5 mg at 10/02/24 0823    glucose chewable tablet 16 g  4 tablet Oral PRN Omar, Ismail U, DO        dextrose bolus 10% 125 mL  125 mL IntraVENous PRN Omar

## 2024-10-02 NOTE — PLAN OF CARE
Problem: Discharge Planning  Goal: Discharge to home or other facility with appropriate resources  Outcome: Progressing  Flowsheets (Taken 10/1/2024 1000 by Deb Hines RN)  Discharge to home or other facility with appropriate resources:   Identify barriers to discharge with patient and caregiver   Arrange for needed discharge resources and transportation as appropriate   Identify discharge learning needs (meds, wound care, etc)   Refer to discharge planning if patient needs post-hospital services based on physician order or complex needs related to functional status, cognitive ability or social support system     Problem: Safety - Adult  Goal: Free from fall injury  Outcome: Progressing

## 2024-10-02 NOTE — PROGRESS NOTES
INPATIENT CARDIOLOGY FOLLOW-UP    Name: Deion Knight    Age: 74 y.o.    Date of Admission: 9/30/2024 10:26 AM    Date of Service: 10/2/2024    Chief Complaint: Follow-up for SVT    Interim History:  No new overnight cardiac complaints.  Patient told me he had elevated dyspnea this morning when he got up went to bed after that no further episodes of dyspnea.  Patient would like to go home.  No further episodes of palpitations or SVT.  Currently with no complaints of CP, palpitations, dizziness, or lightheadedness. SR on telemetry.    Review of Systems:   Cardiac: As per HPI  General: No fever, chills  Pulmonary: As per HPI  HEENT: No visual disturbances, difficult swallowing  GI: No nausea, vomiting  Endocrine: No thyroid disease or DM  Musculoskeletal: VERDUGO x 4, no focal motor deficits  Skin: Intact, no rashes  Neuro/Psych: No headache or seizures    Problem List:  Patient Active Problem List   Diagnosis    ANGIE (acute kidney injury) ([facility])    Acute renal failure (ARF) ([facility])    Acute decompensated heart failure ([facility])    Shortness of breath    NICM (nonischemic cardiomyopathy) ([facility])    Moderate protein-calorie malnutrition ([facility])    CAD (coronary artery disease)    NSTEMI (non-ST elevated myocardial infarction) ([facility])    SVT (supraventricular tachycardia) ([facility])       Allergies:  No Known Allergies    Current Medications:  Current Facility-Administered Medications   Medication Dose Route Frequency Provider Last Rate Last Admin    [Held by provider] buPROPion (WELLBUTRIN XL) extended release tablet 300 mg  300 mg Oral QAM Omar, Ismail U, DO        [Held by provider] isosorbide mononitrate (IMDUR) extended release tablet 30 mg  30 mg Oral Daily Omar, Ismail U, DO        [Held by provider] pantoprazole (PROTONIX) tablet 40 mg  40 mg Oral Daily Omar, Ismail U, DO        metoprolol succinate (TOPROL XL) extended release tablet 25 mg  25 mg Oral Daily Jazmine Gaxiola PA-C   25 mg at 10/02/24 0824    midodrine  (PROAMATINE) tablet 5 mg  5 mg Oral TID Jazmine Galvan PA-C   5 mg at 10/02/24 0823    glucose chewable tablet 16 g  4 tablet Oral PRN Omar, Ismail U, DO        dextrose bolus 10% 125 mL  125 mL IntraVENous PRN Omar, Ismail U, DO        Or    dextrose bolus 10% 250 mL  250 mL IntraVENous PRN Omar, Ismail U, DO        glucagon injection 1 mg  1 mg SubCUTAneous PRN Omar, Ismail U, DO        dextrose 10 % infusion   IntraVENous Continuous PRN Omar, Ismail U, DO        sodium chloride flush 0.9 % injection 5-40 mL  5-40 mL IntraVENous 2 times per day Omar, Ismail U, DO   10 mL at 10/02/24 0823    sodium chloride flush 0.9 % injection 5-40 mL  5-40 mL IntraVENous PRN Omar, Ismail U, DO        0.9 % sodium chloride infusion   IntraVENous PRN Omar, Ismail U, DO        potassium chloride (KLOR-CON M) extended release tablet 40 mEq  40 mEq Oral PRN Omar, Ismail U, DO        Or    potassium bicarb-citric acid (EFFER-K) effervescent tablet 40 mEq  40 mEq Oral PRN Omar, Ismail U, DO        Or    potassium chloride 10 mEq/100 mL IVPB (Peripheral Line)  10 mEq IntraVENous PRN Omar, Ismail U, DO        magnesium sulfate 2000 mg in 50 mL IVPB premix  2,000 mg IntraVENous PRN Omar, Ismail U, DO        polyethylene glycol (GLYCOLAX) packet 17 g  17 g Oral Daily PRN Omar, Ismail U, DO        acetaminophen (TYLENOL) tablet 650 mg  650 mg Oral Q6H PRN Omar, Ismail U, DO        Or    acetaminophen (TYLENOL) suppository 650 mg  650 mg Rectal Q6H PRN Omar, Ismail U, DO        aspirin EC tablet 81 mg  81 mg Oral Daily Omar, Ismail U, DO   81 mg at 10/02/24 0823    arformoterol 15 mcg-budesonide 0.5 mg neb solution   Nebulization BID RT Omar, Ismail U, DO   Given at 10/02/24 0746    atorvastatin (LIPITOR) tablet 80 mg  80 mg Oral Daily Omar, Ismail U, DO   80 mg at 10/02/24 0823    ipratropium (ATROVENT) 0.02 % nebulizer solution 0.5 mg  0.5 mg Nebulization Q6H RT Pat Nelson U, DO   0.5 mg at

## 2024-10-02 NOTE — PROGRESS NOTES
Date:10/2/2024  Patient Name: Deion Knight  MRN: [id number]  : 1950  ROOM #: [id number]    Occupational Therapy order received, chart reviewed and evaluation attempted this AM. Patient is unavailable for OT evaluation due to off floor. Will re-attempt OT evaluation at a later time. Thank you.     Tatiana Martinez OTR/L #[id number]

## 2024-10-02 NOTE — CARE COORDINATION
Case Management Assessment  Initial Evaluation    Date/Time of Evaluation: 10/2/2024 4:10 PM  Assessment Completed by: Tatiana Gray RN    If patient is discharged prior to next notation, then this note serves as note for discharge by case management.    Patient Name: Deion Knight                   YOB: 1950  Diagnosis: Paroxysmal supraventricular tachycardia (HCC) [I47.10]  SVT (supraventricular tachycardia) (HCC) [I47.10]  Anemia of chronic disease [D63.8]  ESRD (end stage renal disease) (HCC) [N18.6]  Demand ischemia (HCC) [I24.89]  Elevated troponin [R79.89]                   Date / Time: 9/30/2024 10:26 AM    Patient Admission Status: Inpatient   Readmission Risk (Low < 19, Mod (19-27), High > 27): Readmission Risk Score: 33.9    Current PCP: Dean Alanis APRN - CNP  PCP verified by CM? Yes ([facility])    Chart Reviewed: Yes      History Provided by: Patient  Patient Orientation: Alert and Oriented    Patient Cognition: Alert    Hospitalization in the last 30 days (Readmission):  Yes    If yes, Readmission Assessment in CM Navigator will be completed.    Readmission Assessment  Number of Days since last admission?: 8-30 days  Previous Disposition: Home with Home Health  Who is being Interviewed: Patient  What was the patient's/caregiver's perception as to why they think they needed to return back to the hospital?: Other (Comment) (dizzy, nausea, tachycardiac at outpt HD)  Did you visit your Primary Care Physician after you left the hospital, before you returned this time?: Yes  Why weren't you able to visit your PCP?: Other (Comment) (NA)  Did you see a specialist, such as Cardiac, Pulmonary, Orthopedic Physician, etc. after you left the hospital?: No  Who advised the patient to return to the hospital?: Other Nurse (Navigator, CTN) (outpt HD unit)  Does the patient report anything that got in the way of taking their medications?: No  In our efforts to provide the best  possible care to you and others like you, can you think of anything that we could have done to help you after you left the hospital the first time, so that you might not have needed to return so soon?: Other (Comment) (no)     Advance Directives:      Code Status: Full Code   Patient's Primary Decision Maker is: Legal Next of Kin    Primary Decision Maker: Dawn David - Brother/Sister - [phone]    Discharge Planning:    Patient lives with: Alone Type of Home: Other (Comment) (mobile home)  Primary Care Giver: Self  Patient Support Systems include: Family Members   Current Financial resources:    Current community resources:    Current services prior to admission: Home Care            Current DME:              Type of Home Care services:  PT, Nursing Services    ADLS  Prior functional level: Independent in ADLs/IADLs  Current functional level: Independent in ADLs/IADLs    PT AM-PAC:   /24  OT AM-PAC: 18 /24    Family can provide assistance at DC: No  Would you like Case Management to discuss the discharge plan with any other family members/significant others, and if so, who? No  Plans to Return to Present Housing: Yes  Other Identified Issues/Barriers to RETURNING to current housing: none  Potential Assistance needed at discharge: Other (Comment)            Potential DME:    Patient expects to discharge to: House  Plan for transportation at discharge:      Financial    Payor: [employer] / Plan: [employer] / Product Type: *No Product type* /       Potential assistance Purchasing Medications:    Meds-to-Beds request: Yes      [facility] - [place], [place] - [address] - [phone] - [phone]  [address]  [place] [id number]  Phone: [phone] Fax: [phone]      Notes:    Factors facilitating achievement of predicted outcomes: Family support, Cooperative, and Pleasant    Barriers to discharge: Medical complications    Additional Case Management Notes: 10/2/24 1248 CM note:

## 2024-10-03 LAB
ALBUMIN SERPL-MCNC: 2.6 G/DL (ref 3.5–5.2)
ALP SERPL-CCNC: 134 U/L (ref 40–129)
ALT SERPL-CCNC: 19 U/L (ref 0–40)
ANION GAP SERPL CALCULATED.3IONS-SCNC: 8 MMOL/L (ref 7–16)
AST SERPL-CCNC: 24 U/L (ref 0–39)
BASOPHILS # BLD: 0.03 K/UL (ref 0–0.2)
BASOPHILS NFR BLD: 0 % (ref 0–2)
BILIRUB SERPL-MCNC: 0.2 MG/DL (ref 0–1.2)
BUN SERPL-MCNC: 13 MG/DL (ref 6–23)
CALCIUM SERPL-MCNC: 8.6 MG/DL (ref 8.6–10.2)
CHLORIDE SERPL-SCNC: 101 MMOL/L (ref 98–107)
CO2 SERPL-SCNC: 26 MMOL/L (ref 22–29)
CREAT SERPL-MCNC: 2.9 MG/DL (ref 0.7–1.2)
EOSINOPHIL # BLD: 0.09 K/UL (ref 0.05–0.5)
EOSINOPHILS RELATIVE PERCENT: 1 % (ref 0–6)
ERYTHROCYTE [DISTWIDTH] IN BLOOD BY AUTOMATED COUNT: 14.6 % (ref 11.5–15)
GFR, ESTIMATED: 22 ML/MIN/1.73M2
GLUCOSE SERPL-MCNC: 151 MG/DL (ref 74–99)
HBA1C MFR BLD: 6.7 % (ref 4–5.6)
HCT VFR BLD AUTO: 28.8 % (ref 37–54)
HGB BLD-MCNC: 9 G/DL (ref 12.5–16.5)
IMM GRANULOCYTES # BLD AUTO: 0.04 K/UL (ref 0–0.58)
IMM GRANULOCYTES NFR BLD: 1 % (ref 0–5)
LYMPHOCYTES NFR BLD: 1 K/UL (ref 1.5–4)
LYMPHOCYTES RELATIVE PERCENT: 13 % (ref 20–42)
MCH RBC QN AUTO: 28.9 PG (ref 26–35)
MCHC RBC AUTO-ENTMCNC: 31.3 G/DL (ref 32–34.5)
MCV RBC AUTO: 92.6 FL (ref 80–99.9)
MONOCYTES NFR BLD: 0.45 K/UL (ref 0.1–0.95)
MONOCYTES NFR BLD: 6 % (ref 2–12)
NEUTROPHILS NFR BLD: 79 % (ref 43–80)
NEUTS SEG NFR BLD: 5.99 K/UL (ref 1.8–7.3)
PLATELET # BLD AUTO: 228 K/UL (ref 130–450)
PMV BLD AUTO: 11.2 FL (ref 7–12)
POTASSIUM SERPL-SCNC: 4.5 MMOL/L (ref 3.5–5)
PROT SERPL-MCNC: 5.6 G/DL (ref 6.4–8.3)
RBC # BLD AUTO: 3.11 M/UL (ref 3.8–5.8)
SODIUM SERPL-SCNC: 135 MMOL/L (ref 132–146)
WBC OTHER # BLD: 7.6 K/UL (ref 4.5–11.5)

## 2024-10-03 PROCEDURE — 6370000000 HC RX 637 (ALT 250 FOR IP): Performed by: INTERNAL MEDICINE

## 2024-10-03 PROCEDURE — 2580000003 HC RX 258: Performed by: INTERNAL MEDICINE

## 2024-10-03 PROCEDURE — 6370000000 HC RX 637 (ALT 250 FOR IP): Performed by: PHYSICIAN ASSISTANT

## 2024-10-03 PROCEDURE — 6360000002 HC RX W HCPCS: Performed by: INTERNAL MEDICINE

## 2024-10-03 PROCEDURE — 36415 COLL VENOUS BLD VENIPUNCTURE: CPT

## 2024-10-03 PROCEDURE — 97530 THERAPEUTIC ACTIVITIES: CPT | Performed by: PHYSICAL THERAPIST

## 2024-10-03 PROCEDURE — 2700000000 HC OXYGEN THERAPY PER DAY

## 2024-10-03 PROCEDURE — 80053 COMPREHEN METABOLIC PANEL: CPT

## 2024-10-03 PROCEDURE — 85025 COMPLETE CBC W/AUTO DIFF WBC: CPT

## 2024-10-03 PROCEDURE — 94640 AIRWAY INHALATION TREATMENT: CPT

## 2024-10-03 PROCEDURE — 83036 HEMOGLOBIN GLYCOSYLATED A1C: CPT

## 2024-10-03 PROCEDURE — 2060000000 HC ICU INTERMEDIATE R&B

## 2024-10-03 PROCEDURE — 97161 PT EVAL LOW COMPLEX 20 MIN: CPT | Performed by: PHYSICAL THERAPIST

## 2024-10-03 RX ORDER — METOPROLOL SUCCINATE 50 MG/1
50 TABLET, EXTENDED RELEASE ORAL DAILY
Status: DISCONTINUED | OUTPATIENT
Start: 2024-10-04 | End: 2024-10-04 | Stop reason: HOSPADM

## 2024-10-03 RX ORDER — ALOGLIPTIN 6.25 MG/1
6.25 TABLET, FILM COATED ORAL DAILY
Status: DISCONTINUED | OUTPATIENT
Start: 2024-10-03 | End: 2024-10-04 | Stop reason: HOSPADM

## 2024-10-03 RX ORDER — NICOTINE 21 MG/24HR
1 PATCH, TRANSDERMAL 24 HOURS TRANSDERMAL DAILY
Status: DISCONTINUED | OUTPATIENT
Start: 2024-10-03 | End: 2024-10-04 | Stop reason: HOSPADM

## 2024-10-03 RX ADMIN — ATORVASTATIN CALCIUM 80 MG: 40 TABLET, FILM COATED ORAL at 07:58

## 2024-10-03 RX ADMIN — IPRATROPIUM BROMIDE 0.5 MG: 0.5 SOLUTION RESPIRATORY (INHALATION) at 22:49

## 2024-10-03 RX ADMIN — MIDODRINE HYDROCHLORIDE 5 MG: 5 TABLET ORAL at 12:52

## 2024-10-03 RX ADMIN — ARFORMOTEROL TARTRATE: 15 SOLUTION RESPIRATORY (INHALATION) at 04:45

## 2024-10-03 RX ADMIN — ALOGLIPTIN 6.25 MG: 6.25 TABLET, FILM COATED ORAL at 09:42

## 2024-10-03 RX ADMIN — ASPIRIN 81 MG: 81 TABLET, COATED ORAL at 07:58

## 2024-10-03 RX ADMIN — ISOSORBIDE MONONITRATE 30 MG: 30 TABLET, EXTENDED RELEASE ORAL at 07:59

## 2024-10-03 RX ADMIN — Medication 10 ML: at 09:42

## 2024-10-03 RX ADMIN — HEPARIN SODIUM 5000 UNITS: 5000 INJECTION INTRAVENOUS; SUBCUTANEOUS at 12:52

## 2024-10-03 RX ADMIN — CLOPIDOGREL BISULFATE 75 MG: 75 TABLET ORAL at 07:58

## 2024-10-03 RX ADMIN — HEPARIN SODIUM 5000 UNITS: 5000 INJECTION INTRAVENOUS; SUBCUTANEOUS at 21:43

## 2024-10-03 RX ADMIN — IPRATROPIUM BROMIDE 0.5 MG: 0.5 SOLUTION RESPIRATORY (INHALATION) at 09:24

## 2024-10-03 RX ADMIN — IPRATROPIUM BROMIDE 0.5 MG: 0.5 SOLUTION RESPIRATORY (INHALATION) at 16:58

## 2024-10-03 RX ADMIN — HEPARIN SODIUM 5000 UNITS: 5000 INJECTION INTRAVENOUS; SUBCUTANEOUS at 06:39

## 2024-10-03 RX ADMIN — MIDODRINE HYDROCHLORIDE 5 MG: 5 TABLET ORAL at 07:59

## 2024-10-03 RX ADMIN — IPRATROPIUM BROMIDE 0.5 MG: 0.5 SOLUTION RESPIRATORY (INHALATION) at 04:45

## 2024-10-03 RX ADMIN — Medication 10 ML: at 21:43

## 2024-10-03 RX ADMIN — METOPROLOL SUCCINATE 25 MG: 25 TABLET, EXTENDED RELEASE ORAL at 07:59

## 2024-10-03 RX ADMIN — ARFORMOTEROL TARTRATE: 15 SOLUTION RESPIRATORY (INHALATION) at 16:58

## 2024-10-03 RX ADMIN — MIDODRINE HYDROCHLORIDE 5 MG: 5 TABLET ORAL at 17:33

## 2024-10-03 ASSESSMENT — PAIN SCALES - GENERAL
PAINLEVEL_OUTOF10: 0

## 2024-10-03 NOTE — PROGRESS NOTES
Spiritual Health History and Assessment/Progress Note  [facility]    (P) Spiritual/Emotional Needs,  ,  ,      Name: Deion Knight MRN: [id number]    Age: 74 y.o.     Sex: male   Language: English   Muslim: Confucianist   SVT (supraventricular tachycardia) (HCC)     Date: 10/3/2024                           Spiritual Assessment began in [facility] PIC/ICU        Referral/Consult From: (P) Rounding   Encounter Overview/Reason: (P) Spiritual/Emotional Needs  Service Provided For: (P) Patient    Lenore, Belief, Meaning:   Patient identifies as spiritual  Family/Friends No family/friends present      Importance and Influence:  Patient has spiritual/personal beliefs that influence decisions regarding their health  Family/Friends No family/friends present    Community:  Patient feels well-supported. Support system includes: Friends and Extended family  Family/Friends No family/friends present    Assessment and Plan of Care:     Patient Interventions include: Facilitated expression of thoughts and feelings, Explored spiritual coping/struggle/distress, and Engaged in theological reflection  Family/Friends Interventions include: No family/friends present    Patient Plan of Care: Spiritual Care available upon further referral  Family/Friends Plan of Care: Spiritual Care available upon further referral    Electronically signed by MARGE Madrigal on 10/3/2024 at 3:12 PM

## 2024-10-03 NOTE — PROGRESS NOTES
Patients heart rate was sustaining in the 160's-170's for roughly 5 minutes. Patient could feel that his heart rate was elevated but was not symptomatic otherwise. The heart rate did drop back to the higher 80's and lower 90's. Morning medications was given early to help with heart rate control and Dr. Main with cardiology notified. See new orders.

## 2024-10-03 NOTE — PLAN OF CARE
Problem: Discharge Planning  Goal: Discharge to home or other facility with appropriate resources  Outcome: Progressing  Flowsheets (Taken 10/3/2024 0728)  Discharge to home or other facility with appropriate resources:   Identify barriers to discharge with patient and caregiver   Arrange for needed discharge resources and transportation as appropriate     Problem: Safety - Adult  Goal: Free from fall injury  Outcome: Progressing  Flowsheets (Taken 10/3/2024 0728)  Free From Fall Injury: Instruct family/caregiver on patient safety     Problem: Respiratory - Adult  Goal: Achieves optimal ventilation and oxygenation  Outcome: Progressing     Problem: Skin/Tissue Integrity - Adult  Goal: Skin integrity remains intact  Outcome: Progressing     Problem: Genitourinary - Adult  Goal: Absence of urinary retention  Outcome: Progressing     Problem: Metabolic/Fluid and Electrolytes - Adult  Goal: Electrolytes maintained within normal limits  Outcome: Progressing  Flowsheets (Taken 10/3/2024 0728)  Electrolytes maintained within normal limits: Monitor labs and assess patient for signs and symptoms of electrolyte imbalances      Yes

## 2024-10-03 NOTE — PROGRESS NOTES
Physical Therapy  Physical Therapy Initial Evaluation/Plan of Care    Room #:  0518/0518-02  Patient Name: Deion Knight  YOB: 1950  MRN: [id number]    Date of Service: 10/3/2024     Tentative placement recommendation: Home with Home Health Physical Therapy versus subacute if goals not met  Equipment recommendation: Patient has needed equipment       Evaluating Physical Therapist: Wlado Beltran, PT, DPT #[id number]      Specific Provider Orders/Date/Referring Provider :     10/01/24 1130    PT eval and treat  Start:  10/01/24 1130,   End:  10/01/24 1130,   ONE TIME,   Standing Count:  1 Occurrences,   R       Mauro Du,  Acknowledge New      Admitting Diagnosis:   Paroxysmal supraventricular tachycardia (HCC) [I47.10]  SVT (supraventricular tachycardia) ([facility]) [I47.10]  Anemia of chronic disease [D63.8]  ESRD (end stage renal disease) ([facility]) [N18.6]  Demand ischemia ([facility]) [I24.89]  Elevated troponin [R79.89]      Surgery: none  Visit Diagnoses         Codes    Paroxysmal supraventricular tachycardia (HCC)    -  Primary I47.10    ESRD (end stage renal disease) ([facility])     N18.6    Anemia of chronic disease     D63.8    Demand ischemia (HCC)     I24.89            Patient Active Problem List   Diagnosis    ANGIE (acute kidney injury) ([facility])    Acute renal failure (ARF) ([facility])    Acute decompensated heart failure (HCC)    Shortness of breath    NICM (nonischemic cardiomyopathy) ([facility])    Moderate protein-calorie malnutrition (HCC)    CAD (coronary artery disease)    NSTEMI (non-ST elevated myocardial infarction) ([facility])    SVT (supraventricular tachycardia) ([facility])        ASSESSMENT of Current Deficits Patient exhibits decreased strength, balance, and endurance impairing functional mobility, transfers, gait , gait distance, and tolerance to activity. Pt mildly unsteady with ambulation 7-8/10 lightheadedness limiting function during session. Pt agreeable to seated exercises following function with VC for function.

## 2024-10-03 NOTE — PROGRESS NOTES
Pulse ox was 93% on room air at rest.  Ambulated patient on room air.   Oxygen saturation was 96% on room air while ambulating. Oxygen was not applied.    Patient did feel short of breath so 1 liter of oxygen for comfort but no oxygen was needed to recover.

## 2024-10-03 NOTE — PLAN OF CARE
Problem: Discharge Planning  Goal: Discharge to home or other facility with appropriate resources  10/3/2024 1930 by Hayden Turcios RN  Outcome: Progressing  10/3/2024 1657 by Jorge L Webb RN  Outcome: Progressing  Flowsheets (Taken 10/3/2024 0728)  Discharge to home or other facility with appropriate resources:   Identify barriers to discharge with patient and caregiver   Arrange for needed discharge resources and transportation as appropriate     Problem: Safety - Adult  Goal: Free from fall injury  10/3/2024 1930 by Hayden Turcios RN  Outcome: Progressing  10/3/2024 1657 by Jorge L Webb RN  Outcome: Progressing  Flowsheets (Taken 10/3/2024 0728)  Free From Fall Injury: Instruct family/caregiver on patient safety     Problem: Respiratory - Adult  Goal: Achieves optimal ventilation and oxygenation  10/3/2024 1930 by Hayden Turcios RN  Outcome: Progressing  10/3/2024 1657 by Jorge L Webb RN  Outcome: Progressing     Problem: Skin/Tissue Integrity - Adult  Goal: Skin integrity remains intact  10/3/2024 1930 by Hayden Turcios RN  Outcome: Progressing  10/3/2024 1657 by Jorge L Webb RN  Outcome: Progressing     Problem: Genitourinary - Adult  Goal: Absence of urinary retention  10/3/2024 1930 by Hayden Turcios RN  Outcome: Progressing  10/3/2024 1657 by Jorge L Webb RN  Outcome: Progressing     Problem: Metabolic/Fluid and Electrolytes - Adult  Goal: Electrolytes maintained within normal limits  10/3/2024 1930 by Hayden Turcios RN  Outcome: Progressing  10/3/2024 1657 by Jorge L Webb RN  Outcome: Progressing  Flowsheets (Taken 10/3/2024 0728)  Electrolytes maintained within normal limits: Monitor labs and assess patient for signs and symptoms of electrolyte imbalances

## 2024-10-03 NOTE — PROGRESS NOTES
Message sent to Dr. Main with cardiology to clarify which Zio Patch (either the Zio AT or the Zio XT) they want applied at discharge as both are listed in the order.       Update:    Dr. Main clarified that a Zio AT will be fine at discharge.

## 2024-10-03 NOTE — PROGRESS NOTES
Department of Internal Medicine  PN    PCP: Dean Alanis APRN - CNP  Admitting Physician: Dr. Du  Consultants:  Date of Service: 9/30/2024    CHIEF COMPLAINT: Elevated heart rate    HISTORY OF PRESENT ILLNESS:    Patient is a 74-year-old male who presented to the ED due to elevated heart rate.  Patient had just finished dialysis when he had significant lightheadedness and shortness of breath.  He was found to have elevated heart rate and sent to the ED for further evaluation management.  States that he has had symptoms on and off for last 2 3 weeks.  As mentioned he has associated lightheadedness and shortness of breath. He denies any chest pain.  Denies any fever or chills.    10/1/2024  Patient seen examined on PCU.  Patient denies any current chest pain, abdominal pain, nausea or vomiting and his breathing is much improved.  Patient stayed in a sinus rhythm WBC 8.2 with hemoglobin 9.7 this morning.  Temperature ranges 97.3-99.3.  Heart rate 75 with a blood pressure 105/70.  O2 sat 99% on room air at rest.  Chest x-ray on admission showed mild cardiomegaly with mild CHF.  Pending cardiology and nephrology evaluation today    10/2/2024  Patient seen examined on PCU.  Patient is seen in dialysis.  Patient's had no more episodes of SVT.  Patient denies any dizziness with standing or shortness of breath walking in the room.  BUN/creatinine was 18/3.8.  Transaminase normal with a WBC 6.9 hemoglobin 9.4.  Temperature 97.4 with heart rate 80 and blood pressure 113/81.  O2 sat 98% room air at rest.  Case discussed with Dr. Main the cardiologist.  The patient is to ambulate in the hallway today to see if he gets short of breath or induce a episode of SVT.  If patient does well with ambulation later today he would be stable for discharge home today.    10/3/2024  Patient seen examined on PCU.  Patient was noted to have O2 sat 93% earlier this morning on room air at rest and 96% with activity.  Patient also had heart

## 2024-10-03 NOTE — PROGRESS NOTES
Gómez Hammonds MD  Nephrology    Progress note.    Patient seen and examined.  Chart reviewed.  Patient had another episode of SVT earlier today but the patient was asymptomatic.  His heart rate was in the 160s.  Denies any shortness of breath chest pain or palpitations.  Pulse oxygenation is 98% on room air.  Appetite his GERD.  No nausea or dysguesia.  Awake and alert . In no acute distress.    Vital SignsBP 119/70   Pulse 82   Temp 97.6 °F (36.4 °C) (Oral)   Resp 16   Ht 1.702 m (5' 7\")   Wt 63.3 kg (139 lb 9.6 oz)   SpO2 98%   BMI 21.86 kg/m²   24HR INTAKE/OUTPUT:  No intake or output data in the 24 hours ending 10/03/24 1336         Physical  Exam      Neck: No JVD  Chest: A tunneled  hemodialysis catheter is present in the right infraclavicular region with no drainage at the exit site.  Lungs: Breath sounds decreased at the bases. No rales or ronchi.  Heart: Irregularly irregular rate and rhythm. No S3 gallop. No murmrur.  Abdomen: Soft non distended, non tender and normal bowel sounds.  Extremeties: No edema.        Medications:  Current Facility-Administered Medications   Medication Dose Route Frequency Provider Last Rate Last Admin    alogliptin (NESINA) tablet 6.25 mg  6.25 mg Oral Daily Mauro Du DO   6.25 mg at 10/03/24 0942    [START ON 10/4/2024] metoprolol succinate (TOPROL XL) extended release tablet 50 mg  50 mg Oral Daily Roxanne Main MD        nicotine (NICODERM CQ) 14 MG/24HR 1 patch  1 patch TransDERmal Daily Roxanne Main MD   1 patch at 10/03/24 1252    [Held by provider] buPROPion (WELLBUTRIN XL) extended release tablet 300 mg  300 mg Oral QAM Pat Nelson U, DO        isosorbide mononitrate (IMDUR) extended release tablet 30 mg  30 mg Oral Daily Roxanne Main MD   30 mg at 10/03/24 0629    [Held by provider] pantoprazole (PROTONIX) tablet 40 mg  40 mg Oral Daily Pat Nelson, DO        midodrine (PROAMATINE) tablet 5 mg  5 mg Oral TID JUDITH Gaxiola  mg Oral Daily Omar, Ismail U, DO   75 mg at 10/03/24 0758    heparin (porcine) injection 5,000 Units  5,000 Units SubCUTAneous 3 times per day Omar, Ismail U, DO   5,000 Units at 10/03/24 1252       Labs:    CBC:   Recent Labs     10/01/24  0831 10/02/24  0725 10/03/24  0548   WBC 8.2 6.9 7.6   HGB 9.7* 9.4* 9.0*    235 228       BMP:   Recent Labs     10/01/24  0831 10/02/24  0725 10/03/24  0548    134 135   K 4.4 3.8 4.5   CL 95* 97* 101   CO2 27 27 26   BUN 13 18 13   CREATININE 2.9* 3.8* 2.9*   GLUCOSE 122* 127* 151*       Phos and Calcium:  Lab Results   Component Value Date    CALCIUM 8.6 10/03/2024    PHOS 3.7 10/01/2024                Assessment/Plan:    1.   Acute kidney injury with underlying chronic kidney disease stage G4  with no  resolution of acute kidney injury.  Patient now dialysis dependent.  We will follow the patient for diabetic support.  As long as the patient is hospitalized and will dialyze the patient on a [facility]izkpr-Kfyqquryd-Azwjgc schedule. Continue dialytic support.    2.   Hypotension .  Hypotension is improved.  We'll limit ultrafiltration on dialysis intermittently..    3.   Anemia secondary to chronic kidney disease.   Target hemoglobin is 10 g/dL.  Continue JEREL and adjust dosage.    4.   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  Follow phosphorus and calcium levels.  Follow PTH.    5.    Paroxysmal supraventricular tachycardia.  Cardiology has signed off.  Will have cardiology see the patient once again.                     Gómez Hammonds MD  Nephrology  Electronically signed by Gómez Hammonds MD on 10/3/2024 at 1:36 PM    Note: This report was completed utilizing computer voice recognition software. Every effort has been made to ensure accuracy, however; inadvertent computerized transcription errors may be present.

## 2024-10-03 NOTE — PLAN OF CARE
Problem: Discharge Planning  Goal: Discharge to home or other facility with appropriate resources  Outcome: Progressing  Flowsheets (Taken 10/2/2024 1000 by Deb Hines RN)  Discharge to home or other facility with appropriate resources:   Identify barriers to discharge with patient and caregiver   Arrange for needed discharge resources and transportation as appropriate   Identify discharge learning needs (meds, wound care, etc)   Refer to discharge planning if patient needs post-hospital services based on physician order or complex needs related to functional status, cognitive ability or social support system     Problem: Safety - Adult  Goal: Free from fall injury  Outcome: Progressing

## 2024-10-04 VITALS
TEMPERATURE: 97.8 F | HEART RATE: 96 BPM | WEIGHT: 136.69 LBS | HEIGHT: 67 IN | RESPIRATION RATE: 18 BRPM | SYSTOLIC BLOOD PRESSURE: 154 MMHG | DIASTOLIC BLOOD PRESSURE: 73 MMHG | BODY MASS INDEX: 21.45 KG/M2 | OXYGEN SATURATION: 100 %

## 2024-10-04 LAB
ALBUMIN SERPL-MCNC: 2.3 G/DL (ref 3.5–5.2)
ALP SERPL-CCNC: 144 U/L (ref 40–129)
ALT SERPL-CCNC: 20 U/L (ref 0–40)
ANION GAP SERPL CALCULATED.3IONS-SCNC: 8 MMOL/L (ref 7–16)
AST SERPL-CCNC: 23 U/L (ref 0–39)
BASOPHILS # BLD: 0.04 K/UL (ref 0–0.2)
BASOPHILS NFR BLD: 1 % (ref 0–2)
BILIRUB SERPL-MCNC: 0.2 MG/DL (ref 0–1.2)
BUN SERPL-MCNC: 17 MG/DL (ref 6–23)
CALCIUM SERPL-MCNC: 8.5 MG/DL (ref 8.6–10.2)
CHLORIDE SERPL-SCNC: 101 MMOL/L (ref 98–107)
CO2 SERPL-SCNC: 27 MMOL/L (ref 22–29)
CREAT SERPL-MCNC: 3.7 MG/DL (ref 0.7–1.2)
EOSINOPHIL # BLD: 0.18 K/UL (ref 0.05–0.5)
EOSINOPHILS RELATIVE PERCENT: 2 % (ref 0–6)
ERYTHROCYTE [DISTWIDTH] IN BLOOD BY AUTOMATED COUNT: 15 % (ref 11.5–15)
GFR, ESTIMATED: 16 ML/MIN/1.73M2
GLUCOSE SERPL-MCNC: 116 MG/DL (ref 74–99)
HCT VFR BLD AUTO: 29.7 % (ref 37–54)
HGB BLD-MCNC: 9.2 G/DL (ref 12.5–16.5)
IMM GRANULOCYTES # BLD AUTO: 0.04 K/UL (ref 0–0.58)
IMM GRANULOCYTES NFR BLD: 1 % (ref 0–5)
LYMPHOCYTES NFR BLD: 0.96 K/UL (ref 1.5–4)
LYMPHOCYTES RELATIVE PERCENT: 12 % (ref 20–42)
MCH RBC QN AUTO: 29.5 PG (ref 26–35)
MCHC RBC AUTO-ENTMCNC: 31 G/DL (ref 32–34.5)
MCV RBC AUTO: 95.2 FL (ref 80–99.9)
MONOCYTES NFR BLD: 0.44 K/UL (ref 0.1–0.95)
MONOCYTES NFR BLD: 6 % (ref 2–12)
NEUTROPHILS NFR BLD: 79 % (ref 43–80)
NEUTS SEG NFR BLD: 6.08 K/UL (ref 1.8–7.3)
PLATELET # BLD AUTO: 231 K/UL (ref 130–450)
PMV BLD AUTO: 10.9 FL (ref 7–12)
POTASSIUM SERPL-SCNC: 4.6 MMOL/L (ref 3.5–5)
PROT SERPL-MCNC: 5.5 G/DL (ref 6.4–8.3)
RBC # BLD AUTO: 3.12 M/UL (ref 3.8–5.8)
SODIUM SERPL-SCNC: 136 MMOL/L (ref 132–146)
WBC OTHER # BLD: 7.7 K/UL (ref 4.5–11.5)

## 2024-10-04 PROCEDURE — 85025 COMPLETE CBC W/AUTO DIFF WBC: CPT

## 2024-10-04 PROCEDURE — 94640 AIRWAY INHALATION TREATMENT: CPT

## 2024-10-04 PROCEDURE — 93246 EXT ECG>7D<15D RECORDING: CPT

## 2024-10-04 PROCEDURE — 6370000000 HC RX 637 (ALT 250 FOR IP): Performed by: INTERNAL MEDICINE

## 2024-10-04 PROCEDURE — 90935 HEMODIALYSIS ONE EVALUATION: CPT

## 2024-10-04 PROCEDURE — 80053 COMPREHEN METABOLIC PANEL: CPT

## 2024-10-04 PROCEDURE — 6360000002 HC RX W HCPCS: Performed by: INTERNAL MEDICINE

## 2024-10-04 PROCEDURE — 36415 COLL VENOUS BLD VENIPUNCTURE: CPT

## 2024-10-04 PROCEDURE — 6370000000 HC RX 637 (ALT 250 FOR IP): Performed by: PHYSICIAN ASSISTANT

## 2024-10-04 RX ORDER — NICOTINE 21 MG/24HR
1 PATCH, TRANSDERMAL 24 HOURS TRANSDERMAL DAILY
Qty: 30 PATCH | Refills: 3 | Status: ON HOLD | OUTPATIENT
Start: 2024-10-05

## 2024-10-04 RX ORDER — METOPROLOL SUCCINATE 50 MG/1
50 TABLET, EXTENDED RELEASE ORAL DAILY
Status: ON HOLD | COMMUNITY
Start: 2024-10-04

## 2024-10-04 RX ADMIN — ISOSORBIDE MONONITRATE 30 MG: 30 TABLET, EXTENDED RELEASE ORAL at 12:52

## 2024-10-04 RX ADMIN — ALOGLIPTIN 6.25 MG: 6.25 TABLET, FILM COATED ORAL at 12:55

## 2024-10-04 RX ADMIN — CLOPIDOGREL BISULFATE 75 MG: 75 TABLET ORAL at 12:52

## 2024-10-04 RX ADMIN — EPOETIN ALFA-EPBX 5000 UNITS: 3000 INJECTION, SOLUTION INTRAVENOUS; SUBCUTANEOUS at 10:19

## 2024-10-04 RX ADMIN — MIDODRINE HYDROCHLORIDE 5 MG: 5 TABLET ORAL at 07:45

## 2024-10-04 RX ADMIN — ATORVASTATIN CALCIUM 80 MG: 40 TABLET, FILM COATED ORAL at 12:51

## 2024-10-04 RX ADMIN — ASPIRIN 81 MG: 81 TABLET, COATED ORAL at 12:51

## 2024-10-04 RX ADMIN — IPRATROPIUM BROMIDE 0.5 MG: 0.5 SOLUTION RESPIRATORY (INHALATION) at 06:29

## 2024-10-04 RX ADMIN — METOPROLOL SUCCINATE 50 MG: 50 TABLET, EXTENDED RELEASE ORAL at 12:51

## 2024-10-04 RX ADMIN — HEPARIN SODIUM 5000 UNITS: 5000 INJECTION INTRAVENOUS; SUBCUTANEOUS at 06:38

## 2024-10-04 RX ADMIN — ARFORMOTEROL TARTRATE: 15 SOLUTION RESPIRATORY (INHALATION) at 06:30

## 2024-10-04 ASSESSMENT — PAIN SCALES - GENERAL
PAINLEVEL_OUTOF10: 0
PAINLEVEL_OUTOF10: 0

## 2024-10-04 NOTE — PROGRESS NOTES
Gómez Hammonds MD  Nephrology    Hemodialysis/Progress note.    Patient seen and examined on hemodialyisis.  Chart reviewed.  Cardiology was notified yesterday about the patient's  Feels better.  No further episodes of SVT.  Denies any shortness of breath chest pain or palpitations.  Pulse oxygenation is 100% on room air.  Appetite his good  No nausea or dysguesia.  Awake and alert . In no acute distress.    Vital SignsBP (!) 154/73   Pulse 96   Temp 97.8 °F (36.6 °C) (Axillary)   Resp 18   Ht 1.702 m (5' 7\")   Wt 62 kg (136 lb 11 oz)   SpO2 100%   BMI 21.41 kg/m²   24HR INTAKE/OUTPUT:    Intake/Output Summary (Last 24 hours) at 10/4/2024 1335  Last data filed at 10/4/2024 1223  Gross per 24 hour   Intake 620 ml   Output 1300 ml   Net -680 ml          Physical  Exam      Neck: No JVD  Chest: A tunneled  hemodialysis catheter is present in the right infraclavicular region with no drainage at the exit site.  Lungs: Breath sounds decreased at the bases. No rales or ronchi.  Heart: Irregularly irregular rate and rhythm. No S3 gallop. No murmrur.  Abdomen: Soft non distended, non tender and normal bowel sounds.  Extremeties: No edema.        Medications:  Current Facility-Administered Medications   Medication Dose Route Frequency Provider Last Rate Last Admin    alogliptin (NESINA) tablet 6.25 mg  6.25 mg Oral Daily Mauro Du A,    6.25 mg at 10/04/24 1255    metoprolol succinate (TOPROL XL) extended release tablet 50 mg  50 mg Oral Daily Roxanne Main MD   50 mg at 10/04/24 1251    nicotine (NICODERM CQ) 14 MG/24HR 1 patch  1 patch TransDERmal Daily Roxanne Main MD   1 patch at 10/04/24 0745    [Held by provider] buPROPion (WELLBUTRIN XL) extended release tablet 300 mg  300 mg Oral QAM Pat Nelson U, DO        isosorbide mononitrate (IMDUR) extended release tablet 30 mg  30 mg Oral Daily Roxanne Main MD   30 mg at 10/04/24 1252    [Held by provider] pantoprazole (PROTONIX) tablet  40 mg  40 mg Oral Daily Omar, Ismail U, DO        midodrine (PROAMATINE) tablet 5 mg  5 mg Oral TID Jazmine Galvan PA-C   5 mg at 10/04/24 0745    glucose chewable tablet 16 g  4 tablet Oral PRN Omar, Ismail U, DO        dextrose bolus 10% 125 mL  125 mL IntraVENous PRN Omar, Ismail U, DO        Or    dextrose bolus 10% 250 mL  250 mL IntraVENous PRN Omar, Ismail U, DO        glucagon injection 1 mg  1 mg SubCUTAneous PRN Omar, Ismail U, DO        dextrose 10 % infusion   IntraVENous Continuous PRN Omar, Ismail U, DO        sodium chloride flush 0.9 % injection 5-40 mL  5-40 mL IntraVENous 2 times per day Omar, Ismail U, DO   10 mL at 10/03/24 2143    sodium chloride flush 0.9 % injection 5-40 mL  5-40 mL IntraVENous PRN Omar, Ismail U, DO        0.9 % sodium chloride infusion   IntraVENous PRN Omar, Ismail U, DO        potassium chloride (KLOR-CON M) extended release tablet 40 mEq  40 mEq Oral PRN Omar, Ismail U, DO        Or    potassium bicarb-citric acid (EFFER-K) effervescent tablet 40 mEq  40 mEq Oral PRN Omar, Ismail U, DO        Or    potassium chloride 10 mEq/100 mL IVPB (Peripheral Line)  10 mEq IntraVENous PRN Omar, Ismail U, DO        magnesium sulfate 2000 mg in 50 mL IVPB premix  2,000 mg IntraVENous PRN Omar, Ismail U, DO        polyethylene glycol (GLYCOLAX) packet 17 g  17 g Oral Daily PRN Omar, Ismail U, DO        acetaminophen (TYLENOL) tablet 650 mg  650 mg Oral Q6H PRN Omar, Ismail U, DO        Or    acetaminophen (TYLENOL) suppository 650 mg  650 mg Rectal Q6H PRN Omar, Ismail U, DO        aspirin EC tablet 81 mg  81 mg Oral Daily Omar, Ismail U, DO   81 mg at 10/04/24 1251    arformoterol 15 mcg-budesonide 0.5 mg neb solution   Nebulization BID RT Omar, Ismail U, DO   Given at 10/04/24 0630    atorvastatin (LIPITOR) tablet 80 mg  80 mg Oral Daily Omar, Issmitha U, DO   80 mg at 10/04/24 1251    ipratropium (ATROVENT) 0.02 % nebulizer solution 0.5 mg

## 2024-10-04 NOTE — DISCHARGE SUMMARY
Voiding without difficulty.    Musculoskeletal:   Denies joint pain, joint stiffness, joint swelling or muscle pain    Neurology:    Denies any headache or focal neurological deficits. No weakness or paresthesia.    Derm:    Denies any rashes, ulcers, or excoriations.  Denies bruising.      Extremities:   Denies any lower extremity swelling or edema.      PHYSICAL EXAM: Abnormal findings noted  VITALS:  Vitals:    10/04/24 0729   BP: 112/65   Pulse: 82   Resp: 16   Temp: 97.2 °F (36.2 °C)   SpO2: 100%         CONSTITUTIONAL:    Awake, alert, cooperative, no apparent distress, and appears stated age    EYES:    PERRL, EOMI, sclera clear, conjunctiva normal    ENT:    Normocephalic, atraumatic, sinuses nontender on palpation. External ears without lesions. Oral pharynx with moist mucus membranes.  Tonsils without erythema or exudates.    NECK:    Supple, symmetrical, trachea midline, no adenopathy, thyroid symmetric, not enlarged and no tenderness, skin normal, no bruits, no JVD    HEMATOLOGIC/LYMPHATICS:    No cervical lymphadenopathy and no supraclavicular lymphadenopathy    LUNGS:    Symmetric. No increased work of breathing, good air exchange, clear to auscultation bilaterally, no wheezes, rhonchi, or rales,     CARDIOVASCULAR:    Normal apical impulse, regular rate and rhythm, normal S1 and S2, no S3 or S4, and no murmur noted    ABDOMEN:    No scars, normal bowel sounds, soft, non-distended, non-tender, no masses palpated, no hepatosplenomegaly, no rebound or guarding elicited on palpation     MUSCULOSKELETAL:    There is no redness, warmth, or swelling of the joints.  Full range of motion noted.  Motor strength is 5 out of 5 all extremities bilaterally.  Tone is normal.    NEUROLOGIC:    Awake, alert, oriented to name, place and time.  Cranial nerves II-XII are grossly intact.  Motor is 5 out of 5 bilaterally.      SKIN:    No bruising or bleeding.  No redness, warmth, or swelling    EXTREMITIES:    Peripheral  Wednesday, Friday      Mauro Du DO  10:30 AM  10/4/2024

## 2024-10-04 NOTE — FLOWSHEET NOTE
10/04/24 1223   Vital Signs   BP (!) 140/80   Temp 98.2 °F (36.8 °C)   Pulse 86   Respirations 16   Weight - Scale 62 kg (136 lb 11 oz)   Weight Method Bed scale   Percent Weight Change -1.59   Pain Assessment   Pain Assessment None - Denies Pain   Post-Hemodialysis Assessment   Post-Treatment Procedures Blood returned;Catheter capped, clamped and heparinized x 2 ports   Machine Disinfection Process Exterior Machine Disinfection   Rinseback Volume (ml) 300 ml   Blood Volume Processed (Liters) 80.1 L   Dialyzer Clearance Lightly streaked   Duration of Treatment (minutes) 210 minutes   Hemodialysis Intake (ml) 300 ml   Hemodialysis Output (ml) 1300 ml   NET Removed (ml) 1000   Tolerated Treatment Good   Bilateral Breath Sounds Diminished   RLE Edema +1   LLE Edema +1   Time Off 1208   Patient Disposition Return to room   Observations & Evaluations   Level of Consciousness 0   Oriented X 3   Heart Rhythm Regular   Respiratory Quality/Effort Unlabored   O2 Device None (Room air)   Skin Color Pink   Skin Condition/Temp Dry;Warm   Abdomen Inspection Soft   Comments tolerated well, net UF 1 L   Access   Add Access? Yes  (R chest TDC)

## 2024-10-04 NOTE — CARE COORDINATION
10/4/24 1136 CM note: no covid testing this admit. Room air. Per cardiology ZIO patch at dc for 14 days. Plan for dc after HD today. Discharge plan is home with resumption of [facility]. HHC order noted. Notified [facility] liaison, of pts dc today. Pt attends dialysis @ [facility], his chair time is MWF @ 5:35am. Notified [facility] of pts dc home today. Pts friend will provide transport home. Electronically signed by Tatiana Gray RN on 10/4/2024 at 11:53 AM

## 2024-10-04 NOTE — PLAN OF CARE
Problem: Discharge Planning  Goal: Discharge to home or other facility with appropriate resources  Outcome: Completed  Flowsheets (Taken 10/4/2024 0729)  Discharge to home or other facility with appropriate resources:   Identify barriers to discharge with patient and caregiver   Arrange for needed discharge resources and transportation as appropriate     Problem: Safety - Adult  Goal: Free from fall injury  Outcome: Completed  Flowsheets (Taken 10/4/2024 0729)  Free From Fall Injury: Instruct family/caregiver on patient safety     Problem: Respiratory - Adult  Goal: Achieves optimal ventilation and oxygenation  Outcome: Completed  Flowsheets (Taken 10/4/2024 0729)  Achieves optimal ventilation and oxygenation:   Assess for changes in respiratory status   Assess for changes in mentation and behavior     Problem: Skin/Tissue Integrity - Adult  Goal: Skin integrity remains intact  Outcome: Completed  Flowsheets (Taken 10/4/2024 0729)  Skin Integrity Remains Intact: Monitor for areas of redness and/or skin breakdown     Problem: Genitourinary - Adult  Goal: Absence of urinary retention  Outcome: Completed  Flowsheets (Taken 10/4/2024 0729)  Absence of urinary retention: Assess patient’s ability to void and empty bladder     Problem: Metabolic/Fluid and Electrolytes - Adult  Goal: Electrolytes maintained within normal limits  Outcome: Completed  Flowsheets (Taken 10/4/2024 0729)  Electrolytes maintained within normal limits: Monitor labs and assess patient for signs and symptoms of electrolyte imbalances     Problem: Musculoskeletal - Adult  Goal: Return mobility to safest level of function  Outcome: Completed  Goal: Maintain proper alignment of affected body part  Outcome: Completed  Goal: Return ADL status to a safe level of function  Outcome: Completed

## 2024-10-07 ENCOUNTER — APPOINTMENT (OUTPATIENT)
Dept: GENERAL RADIOLOGY | Age: 74
End: 2024-10-07
Payer: OTHER GOVERNMENT

## 2024-10-07 ENCOUNTER — HOSPITAL ENCOUNTER (EMERGENCY)
Age: 74
Discharge: HOME | End: 2024-10-07
Attending: EMERGENCY MEDICINE
Payer: OTHER GOVERNMENT

## 2024-10-07 VITALS
RESPIRATION RATE: 20 BRPM | TEMPERATURE: 98.8 F | DIASTOLIC BLOOD PRESSURE: 59 MMHG | OXYGEN SATURATION: 90 % | BODY MASS INDEX: 21.14 KG/M2 | WEIGHT: 135 LBS | HEART RATE: 102 BPM | SYSTOLIC BLOOD PRESSURE: 115 MMHG

## 2024-10-07 DIAGNOSIS — M54.2 NECK PAIN: Primary | ICD-10-CM

## 2024-10-07 LAB
ALBUMIN SERPL-MCNC: 2.5 G/DL (ref 3.5–5.2)
ALP SERPL-CCNC: 132 U/L (ref 40–129)
ALT SERPL-CCNC: 19 U/L (ref 0–40)
ANION GAP SERPL CALCULATED.3IONS-SCNC: 9 MMOL/L (ref 7–16)
AST SERPL-CCNC: 23 U/L (ref 0–39)
BASOPHILS # BLD: 0.03 K/UL (ref 0–0.2)
BASOPHILS NFR BLD: 0 % (ref 0–2)
BILIRUB SERPL-MCNC: 0.4 MG/DL (ref 0–1.2)
BUN SERPL-MCNC: 13 MG/DL (ref 6–23)
CALCIUM SERPL-MCNC: 8 MG/DL (ref 8.6–10.2)
CHLORIDE SERPL-SCNC: 101 MMOL/L (ref 98–107)
CO2 SERPL-SCNC: 25 MMOL/L (ref 22–29)
CREAT SERPL-MCNC: 2.9 MG/DL (ref 0.7–1.2)
EKG ATRIAL RATE: 103 BPM
EKG P AXIS: 43 DEGREES
EKG P-R INTERVAL: 140 MS
EKG Q-T INTERVAL: 374 MS
EKG QRS DURATION: 106 MS
EKG QTC CALCULATION (BAZETT): 489 MS
EKG R AXIS: 6 DEGREES
EKG T AXIS: -152 DEGREES
EKG VENTRICULAR RATE: 103 BPM
EOSINOPHIL # BLD: 0.06 K/UL (ref 0.05–0.5)
EOSINOPHILS RELATIVE PERCENT: 1 % (ref 0–6)
ERYTHROCYTE [DISTWIDTH] IN BLOOD BY AUTOMATED COUNT: 15.9 % (ref 11.5–15)
FLUAV RNA RESP QL NAA+PROBE: NOT DETECTED
FLUBV RNA RESP QL NAA+PROBE: NOT DETECTED
GFR, ESTIMATED: 22 ML/MIN/1.73M2
GLUCOSE SERPL-MCNC: 99 MG/DL (ref 74–99)
HCT VFR BLD AUTO: 30.8 % (ref 37–54)
HGB BLD-MCNC: 9.5 G/DL (ref 12.5–16.5)
IMM GRANULOCYTES # BLD AUTO: 0.05 K/UL (ref 0–0.58)
IMM GRANULOCYTES NFR BLD: 1 % (ref 0–5)
LYMPHOCYTES NFR BLD: 0.62 K/UL (ref 1.5–4)
LYMPHOCYTES RELATIVE PERCENT: 6 % (ref 20–42)
MAGNESIUM SERPL-MCNC: 1.5 MG/DL (ref 1.6–2.6)
MCH RBC QN AUTO: 29.6 PG (ref 26–35)
MCHC RBC AUTO-ENTMCNC: 30.8 G/DL (ref 32–34.5)
MCV RBC AUTO: 96 FL (ref 80–99.9)
MONOCYTES NFR BLD: 0.59 K/UL (ref 0.1–0.95)
MONOCYTES NFR BLD: 6 % (ref 2–12)
NEUTROPHILS NFR BLD: 87 % (ref 43–80)
NEUTS SEG NFR BLD: 8.88 K/UL (ref 1.8–7.3)
PLATELET # BLD AUTO: 247 K/UL (ref 130–450)
PMV BLD AUTO: 10.9 FL (ref 7–12)
POTASSIUM SERPL-SCNC: 3.5 MMOL/L (ref 3.5–5)
PROT SERPL-MCNC: 5.4 G/DL (ref 6.4–8.3)
RBC # BLD AUTO: 3.21 M/UL (ref 3.8–5.8)
SARS-COV-2 RNA RESP QL NAA+PROBE: NOT DETECTED
SODIUM SERPL-SCNC: 135 MMOL/L (ref 132–146)
SOURCE: NORMAL
SPECIMEN DESCRIPTION: NORMAL
TROPONIN I SERPL HS-MCNC: 2294 NG/L (ref 0–11)
TROPONIN I SERPL HS-MCNC: 2343 NG/L (ref 0–11)
WBC OTHER # BLD: 10.2 K/UL (ref 4.5–11.5)

## 2024-10-07 PROCEDURE — 85025 COMPLETE CBC W/AUTO DIFF WBC: CPT

## 2024-10-07 PROCEDURE — 71045 X-RAY EXAM CHEST 1 VIEW: CPT

## 2024-10-07 PROCEDURE — 93005 ELECTROCARDIOGRAM TRACING: CPT

## 2024-10-07 PROCEDURE — 83735 ASSAY OF MAGNESIUM: CPT

## 2024-10-07 PROCEDURE — 80053 COMPREHEN METABOLIC PANEL: CPT

## 2024-10-07 PROCEDURE — 93010 ELECTROCARDIOGRAM REPORT: CPT | Performed by: INTERNAL MEDICINE

## 2024-10-07 PROCEDURE — 87636 SARSCOV2 & INF A&B AMP PRB: CPT

## 2024-10-07 PROCEDURE — 84484 ASSAY OF TROPONIN QUANT: CPT

## 2024-10-07 PROCEDURE — 99285 EMERGENCY DEPT VISIT HI MDM: CPT

## 2024-10-07 NOTE — ED PROVIDER NOTES
[facility] EMERGENCY DEPARTMENT  EMERGENCY DEPARTMENT ENCOUNTER        Pt Name: Deion Knight  MRN: [id number]  Birthdate 1950  Date of evaluation: 10/7/2024  Provider: Grant Ferrara II, DO  PCP: Dean Alanis APRN - CNP  Note Started: 7:52 AM EDT 10/7/24    CHIEF COMPLAINT       Chief Complaint   Patient presents with    Jaw Pain     Bilateral jaw pain while receiving dialysis treatment. Patient received 1 hour of treatment.        HISTORY OF PRESENT ILLNESS: 1 or more Elements            Deion Knight is a 74 y.o. male history of ESRD on dialysis, who presents for complaint of lateral jaw pain that started 1 hour into his dialysis treatment.  Patient states that pain was short-lived however staff was concerned due to his extensive cardiac history.  Patient denies any shortness of breath, recent cough, congestion, fevers, chills, nausea, vomiting.  Patient was recently discharged from the hospital had extensive cardiac workup.  Patient denies any leg swelling.     Nursing Notes were all reviewed and agreed with or any disagreements were addressed in the HPI.      REVIEW OF EXTERNAL NOTE :       Records reviewed for medical hx, surgical, hx, and medication lists      Chart Review/External Note Review    Last Echo reviewed by Me:  Lab Results   Component Value Date    LVEF 15 08/20/2024    LVEFMODE Echo 08/20/2024             Controlled Substance Monitoring:    Acute and Chronic Pain Monitoring:        No data to display                    REVIEW OF SYSTEMS :      Positives and Pertinent negatives as per HPI.     SURGICAL HISTORY     Past Surgical History:   Procedure Laterality Date    CARDIAC PROCEDURE N/A 9/3/2024    Left heart cath / coronary angiography performed by Talha Vu MD at [facility]    CARDIAC PROCEDURE N/A 9/3/2024    Percutaneous coronary intervention performed by Talha Vu MD at [facility] CARDIAC CATH LAB    CARDIAC PROCEDURE N/A 9/3/2024

## 2024-10-09 ENCOUNTER — APPOINTMENT (OUTPATIENT)
Dept: GENERAL RADIOLOGY | Age: 74
End: 2024-10-09
Payer: OTHER GOVERNMENT

## 2024-10-09 ENCOUNTER — HOSPITAL ENCOUNTER (EMERGENCY)
Age: 74
Discharge: ANOTHER HOSPITAL | End: 2024-10-10
Attending: STUDENT IN AN ORGANIZED HEALTH CARE EDUCATION/TRAINING PROGRAM
Payer: OTHER GOVERNMENT

## 2024-10-09 DIAGNOSIS — E83.51 HYPOCALCEMIA: ICD-10-CM

## 2024-10-09 DIAGNOSIS — I47.19 AVNRT (AV NODAL RE-ENTRY TACHYCARDIA) (HCC): Primary | ICD-10-CM

## 2024-10-09 DIAGNOSIS — R94.31 ACUTE ELECTROCARDIOGRAM CHANGES: ICD-10-CM

## 2024-10-09 DIAGNOSIS — Z99.2 DIALYSIS PATIENT (HCC): ICD-10-CM

## 2024-10-09 DIAGNOSIS — J18.9 PNEUMONIA DUE TO INFECTIOUS ORGANISM, UNSPECIFIED LATERALITY, UNSPECIFIED PART OF LUNG: ICD-10-CM

## 2024-10-09 PROBLEM — I25.5 ISCHEMIC CARDIOMYOPATHY: Status: ACTIVE | Noted: 2024-10-09

## 2024-10-09 PROBLEM — I50.42 CHRONIC COMBINED SYSTOLIC AND DIASTOLIC CONGESTIVE HEART FAILURE (HCC): Status: ACTIVE | Noted: 2024-10-09

## 2024-10-09 PROBLEM — N18.6 ESRD (END STAGE RENAL DISEASE) (HCC): Status: ACTIVE | Noted: 2024-10-09

## 2024-10-09 LAB
ALBUMIN SERPL-MCNC: 1.9 G/DL (ref 3.5–5.2)
ALBUMIN SERPL-MCNC: 2 G/DL (ref 3.5–5.2)
ALP SERPL-CCNC: 104 U/L (ref 40–129)
ALT SERPL-CCNC: 9 U/L (ref 0–40)
ANION GAP SERPL CALCULATED.3IONS-SCNC: 9 MMOL/L (ref 7–16)
AST SERPL-CCNC: 18 U/L (ref 0–39)
B PARAP IS1001 DNA NPH QL NAA+NON-PROBE: NOT DETECTED
B PERT DNA SPEC QL NAA+PROBE: NOT DETECTED
BASOPHILS # BLD: 0.02 K/UL (ref 0–0.2)
BASOPHILS NFR BLD: 0 % (ref 0–2)
BILIRUB DIRECT SERPL-MCNC: 0.2 MG/DL (ref 0–0.3)
BILIRUB INDIRECT SERPL-MCNC: 0.1 MG/DL (ref 0–1)
BILIRUB SERPL-MCNC: 0.3 MG/DL (ref 0–1.2)
BUN SERPL-MCNC: 19 MG/DL (ref 6–23)
C PNEUM DNA NPH QL NAA+NON-PROBE: NOT DETECTED
CALCIUM SERPL-MCNC: 6.4 MG/DL (ref 8.6–10.2)
CHLORIDE SERPL-SCNC: 114 MMOL/L (ref 98–107)
CO2 SERPL-SCNC: 18 MMOL/L (ref 22–29)
CREAT SERPL-MCNC: 3.5 MG/DL (ref 0.7–1.2)
EOSINOPHIL # BLD: 0.06 K/UL (ref 0.05–0.5)
EOSINOPHILS RELATIVE PERCENT: 1 % (ref 0–6)
ERYTHROCYTE [DISTWIDTH] IN BLOOD BY AUTOMATED COUNT: 16.1 % (ref 11.5–15)
FLUAV RNA NPH QL NAA+NON-PROBE: NOT DETECTED
FLUBV RNA NPH QL NAA+NON-PROBE: NOT DETECTED
GFR, ESTIMATED: 18 ML/MIN/1.73M2
GLUCOSE SERPL-MCNC: 125 MG/DL (ref 74–99)
HADV DNA NPH QL NAA+NON-PROBE: NOT DETECTED
HCOV 229E RNA NPH QL NAA+NON-PROBE: NOT DETECTED
HCOV HKU1 RNA NPH QL NAA+NON-PROBE: NOT DETECTED
HCOV NL63 RNA NPH QL NAA+NON-PROBE: NOT DETECTED
HCOV OC43 RNA NPH QL NAA+NON-PROBE: NOT DETECTED
HCT VFR BLD AUTO: 27.1 % (ref 37–54)
HGB BLD-MCNC: 8.2 G/DL (ref 12.5–16.5)
HMPV RNA NPH QL NAA+NON-PROBE: NOT DETECTED
HPIV1 RNA NPH QL NAA+NON-PROBE: NOT DETECTED
HPIV2 RNA NPH QL NAA+NON-PROBE: NOT DETECTED
HPIV3 RNA NPH QL NAA+NON-PROBE: NOT DETECTED
HPIV4 RNA NPH QL NAA+NON-PROBE: NOT DETECTED
IMM GRANULOCYTES # BLD AUTO: 0.08 K/UL (ref 0–0.58)
IMM GRANULOCYTES NFR BLD: 1 % (ref 0–5)
LACTATE BLDV-SCNC: 1.4 MMOL/L (ref 0.5–1.9)
LYMPHOCYTES NFR BLD: 0.84 K/UL (ref 1.5–4)
LYMPHOCYTES RELATIVE PERCENT: 8 % (ref 20–42)
M PNEUMO DNA NPH QL NAA+NON-PROBE: NOT DETECTED
MCH RBC QN AUTO: 29.4 PG (ref 26–35)
MCHC RBC AUTO-ENTMCNC: 30.3 G/DL (ref 32–34.5)
MCV RBC AUTO: 97.1 FL (ref 80–99.9)
MONOCYTES NFR BLD: 0.47 K/UL (ref 0.1–0.95)
MONOCYTES NFR BLD: 5 % (ref 2–12)
NEUTROPHILS NFR BLD: 86 % (ref 43–80)
NEUTS SEG NFR BLD: 8.79 K/UL (ref 1.8–7.3)
PHOSPHATE SERPL-MCNC: 1.7 MG/DL (ref 2.5–4.5)
PLATELET # BLD AUTO: 226 K/UL (ref 130–450)
PMV BLD AUTO: 10.7 FL (ref 7–12)
POTASSIUM SERPL-SCNC: 3.6 MMOL/L (ref 3.5–5)
PROT SERPL-MCNC: 4 G/DL (ref 6.4–8.3)
RBC # BLD AUTO: 2.79 M/UL (ref 3.8–5.8)
RSV RNA NPH QL NAA+NON-PROBE: NOT DETECTED
RV+EV RNA NPH QL NAA+NON-PROBE: NOT DETECTED
SARS-COV-2 RNA NPH QL NAA+NON-PROBE: NOT DETECTED
SODIUM SERPL-SCNC: 141 MMOL/L (ref 132–146)
SPECIMEN DESCRIPTION: NORMAL
TROPONIN I SERPL HS-MCNC: 1470 NG/L (ref 0–11)
TROPONIN I SERPL HS-MCNC: 1582 NG/L (ref 0–11)
WBC OTHER # BLD: 10.3 K/UL (ref 4.5–11.5)

## 2024-10-09 PROCEDURE — 96374 THER/PROPH/DIAG INJ IV PUSH: CPT

## 2024-10-09 PROCEDURE — 87040 BLOOD CULTURE FOR BACTERIA: CPT

## 2024-10-09 PROCEDURE — 80053 COMPREHEN METABOLIC PANEL: CPT

## 2024-10-09 PROCEDURE — 2580000003 HC RX 258

## 2024-10-09 PROCEDURE — 6360000002 HC RX W HCPCS: Performed by: STUDENT IN AN ORGANIZED HEALTH CARE EDUCATION/TRAINING PROGRAM

## 2024-10-09 PROCEDURE — 93005 ELECTROCARDIOGRAM TRACING: CPT | Performed by: STUDENT IN AN ORGANIZED HEALTH CARE EDUCATION/TRAINING PROGRAM

## 2024-10-09 PROCEDURE — 84100 ASSAY OF PHOSPHORUS: CPT

## 2024-10-09 PROCEDURE — 6360000002 HC RX W HCPCS

## 2024-10-09 PROCEDURE — 2500000003 HC RX 250 WO HCPCS: Performed by: STUDENT IN AN ORGANIZED HEALTH CARE EDUCATION/TRAINING PROGRAM

## 2024-10-09 PROCEDURE — 99285 EMERGENCY DEPT VISIT HI MDM: CPT

## 2024-10-09 PROCEDURE — APPSS60 APP SPLIT SHARED TIME 46-60 MINUTES

## 2024-10-09 PROCEDURE — 85025 COMPLETE CBC W/AUTO DIFF WBC: CPT

## 2024-10-09 PROCEDURE — 83605 ASSAY OF LACTIC ACID: CPT

## 2024-10-09 PROCEDURE — 2500000003 HC RX 250 WO HCPCS

## 2024-10-09 PROCEDURE — 93005 ELECTROCARDIOGRAM TRACING: CPT

## 2024-10-09 PROCEDURE — 6370000000 HC RX 637 (ALT 250 FOR IP): Performed by: STUDENT IN AN ORGANIZED HEALTH CARE EDUCATION/TRAINING PROGRAM

## 2024-10-09 PROCEDURE — 0202U NFCT DS 22 TRGT SARS-COV-2: CPT

## 2024-10-09 PROCEDURE — 96375 TX/PRO/DX INJ NEW DRUG ADDON: CPT

## 2024-10-09 PROCEDURE — 71045 X-RAY EXAM CHEST 1 VIEW: CPT

## 2024-10-09 PROCEDURE — 82040 ASSAY OF SERUM ALBUMIN: CPT

## 2024-10-09 PROCEDURE — 2580000003 HC RX 258: Performed by: STUDENT IN AN ORGANIZED HEALTH CARE EDUCATION/TRAINING PROGRAM

## 2024-10-09 PROCEDURE — 96367 TX/PROPH/DG ADDL SEQ IV INF: CPT

## 2024-10-09 PROCEDURE — 82248 BILIRUBIN DIRECT: CPT

## 2024-10-09 PROCEDURE — 96360 HYDRATION IV INFUSION INIT: CPT

## 2024-10-09 PROCEDURE — 96366 THER/PROPH/DIAG IV INF ADDON: CPT

## 2024-10-09 PROCEDURE — 96361 HYDRATE IV INFUSION ADD-ON: CPT

## 2024-10-09 PROCEDURE — 96365 THER/PROPH/DIAG IV INF INIT: CPT

## 2024-10-09 PROCEDURE — 99223 1ST HOSP IP/OBS HIGH 75: CPT | Performed by: INTERNAL MEDICINE

## 2024-10-09 PROCEDURE — 84484 ASSAY OF TROPONIN QUANT: CPT

## 2024-10-09 RX ORDER — ETOMIDATE 2 MG/ML
7 INJECTION INTRAVENOUS ONCE
Status: COMPLETED | OUTPATIENT
Start: 2024-10-09 | End: 2024-10-09

## 2024-10-09 RX ORDER — SODIUM CHLORIDE 9 MG/ML
INJECTION, SOLUTION INTRAVENOUS
Status: COMPLETED
Start: 2024-10-09 | End: 2024-10-09

## 2024-10-09 RX ORDER — SODIUM CHLORIDE 9 MG/ML
INJECTION, SOLUTION INTRAVENOUS
Status: DISPENSED
Start: 2024-10-09 | End: 2024-10-09

## 2024-10-09 RX ORDER — MIDODRINE HYDROCHLORIDE 5 MG/1
5 TABLET ORAL ONCE
Status: COMPLETED | OUTPATIENT
Start: 2024-10-09 | End: 2024-10-09

## 2024-10-09 RX ORDER — KETAMINE HYDROCHLORIDE 10 MG/ML
INJECTION, SOLUTION INTRAMUSCULAR; INTRAVENOUS
Status: COMPLETED
Start: 2024-10-09 | End: 2024-10-09

## 2024-10-09 RX ORDER — KETAMINE HYDROCHLORIDE 10 MG/ML
20 INJECTION, SOLUTION INTRAMUSCULAR; INTRAVENOUS ONCE
Status: COMPLETED | OUTPATIENT
Start: 2024-10-09 | End: 2024-10-09

## 2024-10-09 RX ORDER — ETOMIDATE 2 MG/ML
INJECTION INTRAVENOUS
Status: DISPENSED
Start: 2024-10-09 | End: 2024-10-09

## 2024-10-09 RX ORDER — ONDANSETRON 2 MG/ML
INJECTION INTRAMUSCULAR; INTRAVENOUS
Status: COMPLETED
Start: 2024-10-09 | End: 2024-10-09

## 2024-10-09 RX ORDER — AMIODARONE HYDROCHLORIDE 150 MG/3ML
INJECTION, SOLUTION INTRAVENOUS
Status: COMPLETED
Start: 2024-10-09 | End: 2024-10-09

## 2024-10-09 RX ORDER — ADENOSINE 3 MG/ML
6 INJECTION, SOLUTION INTRAVENOUS ONCE
Status: COMPLETED | OUTPATIENT
Start: 2024-10-09 | End: 2024-10-09

## 2024-10-09 RX ADMIN — AMIODARONE HYDROCHLORIDE 1 MG/MIN: 1.8 INJECTION, SOLUTION INTRAVENOUS at 08:04

## 2024-10-09 RX ADMIN — ADENOSINE 6 MG: 3 INJECTION INTRAVENOUS at 06:37

## 2024-10-09 RX ADMIN — CEFEPIME 2000 MG: 2 INJECTION, POWDER, FOR SOLUTION INTRAVENOUS at 11:08

## 2024-10-09 RX ADMIN — MIDODRINE HYDROCHLORIDE 10 MG: 5 TABLET ORAL at 07:31

## 2024-10-09 RX ADMIN — DOXYCYCLINE 100 MG: 100 INJECTION, POWDER, LYOPHILIZED, FOR SOLUTION INTRAVENOUS at 11:42

## 2024-10-09 RX ADMIN — AMIODARONE HYDROCHLORIDE 150 MG: 50 INJECTION, SOLUTION INTRAVENOUS at 07:15

## 2024-10-09 RX ADMIN — PHENYLEPHRINE HYDROCHLORIDE 30 MCG/MIN: 50 INJECTION INTRAVENOUS at 08:21

## 2024-10-09 RX ADMIN — SODIUM CHLORIDE 250 ML: 9 INJECTION, SOLUTION INTRAVENOUS at 06:45

## 2024-10-09 RX ADMIN — KETAMINE HYDROCHLORIDE 20 MG: 10 INJECTION, SOLUTION INTRAMUSCULAR; INTRAVENOUS at 07:32

## 2024-10-09 RX ADMIN — ETOMIDATE INJECTION 7 MG: 2 SOLUTION INTRAVENOUS at 06:43

## 2024-10-09 RX ADMIN — SODIUM PHOSPHATE, MONOBASIC, MONOHYDRATE AND SODIUM PHOSPHATE, DIBASIC, ANHYDROUS 30 MMOL: 142; 276 INJECTION, SOLUTION INTRAVENOUS at 18:28

## 2024-10-09 RX ADMIN — KETAMINE HYDROCHLORIDE 20 MG: 10 INJECTION INTRAMUSCULAR; INTRAVENOUS at 07:32

## 2024-10-09 RX ADMIN — ONDANSETRON 4 MG: 2 INJECTION, SOLUTION INTRAMUSCULAR; INTRAVENOUS at 07:32

## 2024-10-09 NOTE — ED NOTES
Talking to pt and pt started to c/o feeling hot, then HR escalated to 200's, and pt stopped responding. Called for assistance to room

## 2024-10-09 NOTE — ED PROVIDER NOTES
Department of Emergency Medicine     Written by: Jacinta Perales MD  Patient Name: Deion Knight  Admit Date: 10/9/2024  6:14 AM  MRN: [id number]                   : 1950    HPI   No chief complaint on file.      Deion Knight is a 74 y.o. male that presents to the ED with concerns for shortness of breath.  The patient was at dialysis and staff called EMS for shortness of breath and hypotension.  80/60.  Patient was here last week for SVT.  He was found to be in SVT.  Line was placed.  He said has been having shortness of breath for the past few days.  His legs are swollen, rales on exam, not normally on oxygen.  This that he is 94% at best on room air, therefore they put him on 2 L nasal cannula oxygen.  When he was here last week, adenosine was attempted to convert his AVNRT however it was unsuccessful therefore necessitating electrical cardioversion which was successful on first attempt at 100 J.    Review of systems   Pertinent positives and negatives mentioned in the HPI/MDM.      Physical   Physical Exam  Constitutional:       General: He is not in acute distress.     Appearance: Normal appearance.   HENT:      Mouth/Throat:      Mouth: Mucous membranes are moist.   Eyes:      Extraocular Movements: Extraocular movements intact.      Pupils: Pupils are equal, round, and reactive to light.   Cardiovascular:      Rate and Rhythm: Regular rhythm. Tachycardia present.      Pulses: Normal pulses.      Heart sounds: Normal heart sounds.   Pulmonary:      Effort: Pulmonary effort is normal. No respiratory distress.      Breath sounds: No stridor.   Abdominal:      General: There is no distension.      Palpations: Abdomen is soft.      Tenderness: There is no abdominal tenderness.   Musculoskeletal:         General: Swelling present.      Right lower leg: Edema present.      Left lower leg: Edema present.   Skin:     Capillary Refill: Capillary refill takes less than 2 seconds.      Coloration: Skin is not

## 2024-10-09 NOTE — CONSULTS
CALCIUM 8.5 (L) 10/04/2024    CAION 1.24 08/23/2024    PHOS 1.7 (L) 10/09/2024    PHOS 3.7 10/01/2024    PHOS 2.5 09/03/2024    MG 1.5 (L) 10/07/2024    MG 2.0 10/01/2024    MG 2.0 09/30/2024       BMP:   Recent Labs     10/07/24  0827 10/09/24  0718    141   K 3.5 3.6    114*   CO2 25 18*   BUN 13 19   CREATININE 2.9* 3.5*   GLUCOSE 99 125*             Labs:  CBC with Differential:    Lab Results   Component Value Date/Time    WBC 10.3 10/09/2024 07:18 AM    RBC 2.79 10/09/2024 07:18 AM    HGB 8.2 10/09/2024 07:18 AM    HCT 27.1 10/09/2024 07:18 AM     10/09/2024 07:18 AM    MCV 97.1 10/09/2024 07:18 AM    MCH 29.4 10/09/2024 07:18 AM    MCHC 30.3 10/09/2024 07:18 AM    RDW 16.1 10/09/2024 07:18 AM    LYMPHOPCT 8 10/09/2024 07:18 AM    MONOPCT 5 10/09/2024 07:18 AM    EOSPCT 1 10/09/2024 07:18 AM    BASOPCT 0 10/09/2024 07:18 AM    MONOSABS 0.47 10/09/2024 07:18 AM    LYMPHSABS 0.84 10/09/2024 07:18 AM    EOSABS 0.06 10/09/2024 07:18 AM    BASOSABS 0.02 10/09/2024 07:18 AM     BMP:    Lab Results   Component Value Date/Time     10/09/2024 07:18 AM    K 3.6 10/09/2024 07:18 AM     10/09/2024 07:18 AM    CO2 18 10/09/2024 07:18 AM    BUN 19 10/09/2024 07:18 AM    CREATININE 3.5 10/09/2024 07:18 AM    CALCIUM 6.4 10/09/2024 07:18 AM    LABGLOM 18 10/09/2024 07:18 AM    GLUCOSE 125 10/09/2024 07:18 AM     Albumin:  No results found for: \"LABALBU\"  Magnesium:    Lab Results   Component Value Date/Time    MG 1.5 10/07/2024 08:27 AM     Phosphorus:    Lab Results   Component Value Date/Time    PHOS 1.7 10/09/2024 07:18 AM     LDH:  No results found for: \"LDH\"  Uric Acid:    Lab Results   Component Value Date/Time    URICACID 5.1 08/23/2024 05:46 AM     Troponin:  No results found for: \"TROPONINI\"  U/A:    Lab Results   Component Value Date/Time    COLORU Yellow 08/21/2024 01:12 PM    PROTEINU >=300 08/21/2024 01:12 PM    PHUR 6.0 08/21/2024 01:12 PM    WBCUA 10 TO 20 08/21/2024 01:12 PM 
xray, electrocardiogram, telemetry, echocardiogram, stress testing, and coronary angiography).  I have reviewed the above documentation completed by Lindsey DANIEL CNP including past medical, social, and family history and agree with the findings, assessment and plan except where noted.  Plan formulated by me.  I participated in all aspects of the medical decision making and performed the substantive portion of the visit by contributing >50% of the total visit time.  Please see my additional contributions to the HPI, physical exam, and assessment / medical decision making:  _______________________________________________________________________    Presented with shortness of breath and hypotension from dialysis found to be in SVT, underwent cardioversion, initially successful and recurrent SVT requiring other cardioversion and IV amiodarone was initiated.  Recent admission found to have SVT requiring cardioversion.  Has severe LV dysfunction, multivessel CAD and recent RCA stents.  Residual severe diffuse disease elsewhere.  Troponin now 1000's.  Currently denies chest pain or shortness of breath.    Physical Exam:  /69   Pulse 81   Temp 98.7 °F (37.1 °C)   Resp 22   SpO2 100%   General appearance: Chronically ill-appearing gentleman, frail awake, alert, no acute respiratory distress  Skin: Intact, no rash  ENMT: Moist mucous membranes  Neck: Supple, no carotid bruits.  Normal jugular venous pressure  Lungs: Clear to auscultation bilaterally. No wheezes, rales, or rhonchi  Cardiac: Regular rhythm with a normal rate, normal S1&S2, no murmurs apparent  Abdomen: Soft, positive bowel sounds, nontender  Extremities: Moves all extremities x 4, no lower extremity edema  Neurologic: No focal motor deficits apparent, normal mood and affect    Telemetry: SVT -> sinus rhythm  EKG:  10/9/2024:  bpm with diffuse ST changes left axis    10/9/2024 post cardioversion: Sinus tach 103 bpm normal axis and

## 2024-10-10 ENCOUNTER — HOSPITAL ENCOUNTER (INPATIENT)
Age: 74
LOS: 7 days | Discharge: NURSING, REHAB OR LONG-TERM CARE | DRG: 273 | End: 2024-10-17
Attending: INTERNAL MEDICINE | Admitting: STUDENT IN AN ORGANIZED HEALTH CARE EDUCATION/TRAINING PROGRAM
Payer: OTHER GOVERNMENT

## 2024-10-10 ENCOUNTER — ANESTHESIA (OUTPATIENT)
Age: 74
End: 2024-10-10
Payer: OTHER GOVERNMENT

## 2024-10-10 ENCOUNTER — ANESTHESIA EVENT (OUTPATIENT)
Age: 74
End: 2024-10-10
Payer: OTHER GOVERNMENT

## 2024-10-10 VITALS
DIASTOLIC BLOOD PRESSURE: 69 MMHG | SYSTOLIC BLOOD PRESSURE: 111 MMHG | OXYGEN SATURATION: 96 % | RESPIRATION RATE: 19 BRPM | WEIGHT: 135 LBS | HEART RATE: 89 BPM | TEMPERATURE: 98 F | HEIGHT: 67 IN | BODY MASS INDEX: 21.19 KG/M2

## 2024-10-10 DIAGNOSIS — I47.10 SVT (SUPRAVENTRICULAR TACHYCARDIA) (HCC): ICD-10-CM

## 2024-10-10 DIAGNOSIS — F41.9 ANXIETY: ICD-10-CM

## 2024-10-10 DIAGNOSIS — I95.9 HYPOTENSION, UNSPECIFIED HYPOTENSION TYPE: Primary | ICD-10-CM

## 2024-10-10 PROBLEM — N18.6 END STAGE RENAL DISEASE (HCC): Status: ACTIVE | Noted: 2024-10-10

## 2024-10-10 PROBLEM — D64.9 ANEMIA: Status: ACTIVE | Noted: 2024-10-10

## 2024-10-10 PROBLEM — I50.20 HFREF (HEART FAILURE WITH REDUCED EJECTION FRACTION) (HCC): Status: ACTIVE | Noted: 2024-10-10

## 2024-10-10 PROBLEM — I47.19 AVNRT (AV NODAL RE-ENTRY TACHYCARDIA) (HCC): Status: ACTIVE | Noted: 2024-10-10

## 2024-10-10 LAB
ALBUMIN SERPL-MCNC: 2.4 G/DL (ref 3.5–5.2)
ALP SERPL-CCNC: 140 U/L (ref 40–129)
ALT SERPL-CCNC: 14 U/L (ref 0–40)
ANION GAP SERPL CALCULATED.3IONS-SCNC: 15 MMOL/L (ref 7–16)
AST SERPL-CCNC: 23 U/L (ref 0–39)
BASOPHILS # BLD: 0.02 K/UL (ref 0–0.2)
BASOPHILS NFR BLD: 0 % (ref 0–2)
BILIRUB SERPL-MCNC: 0.2 MG/DL (ref 0–1.2)
BUN SERPL-MCNC: 28 MG/DL (ref 6–23)
CALCIUM SERPL-MCNC: 8.2 MG/DL (ref 8.6–10.2)
CHLORIDE SERPL-SCNC: 101 MMOL/L (ref 98–107)
CO2 SERPL-SCNC: 20 MMOL/L (ref 22–29)
CREAT SERPL-MCNC: 5.1 MG/DL (ref 0.7–1.2)
ECHO BSA: 1.69 M2
EKG ATRIAL RATE: 103 BPM
EKG ATRIAL RATE: 187 BPM
EKG P AXIS: 46 DEGREES
EKG P-R INTERVAL: 136 MS
EKG Q-T INTERVAL: 250 MS
EKG Q-T INTERVAL: 372 MS
EKG QRS DURATION: 104 MS
EKG QRS DURATION: 106 MS
EKG QTC CALCULATION (BAZETT): 440 MS
EKG QTC CALCULATION (BAZETT): 487 MS
EKG R AXIS: -49 DEGREES
EKG R AXIS: -7 DEGREES
EKG T AXIS: -143 DEGREES
EKG T AXIS: 129 DEGREES
EKG VENTRICULAR RATE: 103 BPM
EKG VENTRICULAR RATE: 186 BPM
EOSINOPHIL # BLD: 0.2 K/UL (ref 0.05–0.5)
EOSINOPHILS RELATIVE PERCENT: 3 % (ref 0–6)
ERYTHROCYTE [DISTWIDTH] IN BLOOD BY AUTOMATED COUNT: 16 % (ref 11.5–15)
GFR, ESTIMATED: 11 ML/MIN/1.73M2
GLUCOSE BLD-MCNC: 123 MG/DL (ref 74–99)
GLUCOSE BLD-MCNC: 50 MG/DL (ref 74–99)
GLUCOSE BLD-MCNC: 56 MG/DL (ref 74–99)
GLUCOSE BLD-MCNC: 64 MG/DL (ref 74–99)
GLUCOSE BLD-MCNC: <40 MG/DL (ref 74–99)
GLUCOSE SERPL-MCNC: 173 MG/DL (ref 74–99)
GLUCOSE SERPL-MCNC: 234 MG/DL (ref 74–99)
HCT VFR BLD AUTO: 24.5 % (ref 37–54)
HGB BLD-MCNC: 7 G/DL (ref 12.5–16.5)
IMM GRANULOCYTES # BLD AUTO: 0.04 K/UL (ref 0–0.58)
IMM GRANULOCYTES NFR BLD: 1 % (ref 0–5)
LYMPHOCYTES NFR BLD: 0.51 K/UL (ref 1.5–4)
LYMPHOCYTES RELATIVE PERCENT: 8 % (ref 20–42)
MCH RBC QN AUTO: 28.1 PG (ref 26–35)
MCHC RBC AUTO-ENTMCNC: 28.6 G/DL (ref 32–34.5)
MCV RBC AUTO: 98.4 FL (ref 80–99.9)
MONOCYTES NFR BLD: 0.41 K/UL (ref 0.1–0.95)
MONOCYTES NFR BLD: 6 % (ref 2–12)
NEUTROPHILS NFR BLD: 83 % (ref 43–80)
NEUTS SEG NFR BLD: 5.6 K/UL (ref 1.8–7.3)
PLATELET # BLD AUTO: 185 K/UL (ref 130–450)
PMV BLD AUTO: 10.8 FL (ref 7–12)
POTASSIUM SERPL-SCNC: 3.9 MMOL/L (ref 3.5–5)
PROT SERPL-MCNC: 5.5 G/DL (ref 6.4–8.3)
RBC # BLD AUTO: 2.49 M/UL (ref 3.8–5.8)
RBC # BLD: ABNORMAL 10*6/UL
SODIUM SERPL-SCNC: 136 MMOL/L (ref 132–146)
WBC OTHER # BLD: 6.8 K/UL (ref 4.5–11.5)

## 2024-10-10 PROCEDURE — 85025 COMPLETE CBC W/AUTO DIFF WBC: CPT

## 2024-10-10 PROCEDURE — 2580000003 HC RX 258: Performed by: INTERNAL MEDICINE

## 2024-10-10 PROCEDURE — 7100000001 HC PACU RECOVERY - ADDTL 15 MIN

## 2024-10-10 PROCEDURE — C1894 INTRO/SHEATH, NON-LASER: HCPCS | Performed by: INTERNAL MEDICINE

## 2024-10-10 PROCEDURE — 96366 THER/PROPH/DIAG IV INF ADDON: CPT

## 2024-10-10 PROCEDURE — 3700000001 HC ADD 15 MINUTES (ANESTHESIA): Performed by: INTERNAL MEDICINE

## 2024-10-10 PROCEDURE — 93653 COMPRE EP EVAL TX SVT: CPT | Performed by: INTERNAL MEDICINE

## 2024-10-10 PROCEDURE — 6360000002 HC RX W HCPCS: Performed by: STUDENT IN AN ORGANIZED HEALTH CARE EDUCATION/TRAINING PROGRAM

## 2024-10-10 PROCEDURE — 2709999900 HC NON-CHARGEABLE SUPPLY: Performed by: INTERNAL MEDICINE

## 2024-10-10 PROCEDURE — 02K83ZZ MAP CONDUCTION MECHANISM, PERCUTANEOUS APPROACH: ICD-10-PCS | Performed by: INTERNAL MEDICINE

## 2024-10-10 PROCEDURE — 99223 1ST HOSP IP/OBS HIGH 75: CPT | Performed by: STUDENT IN AN ORGANIZED HEALTH CARE EDUCATION/TRAINING PROGRAM

## 2024-10-10 PROCEDURE — 2700000000 HC OXYGEN THERAPY PER DAY

## 2024-10-10 PROCEDURE — 96361 HYDRATE IV INFUSION ADD-ON: CPT

## 2024-10-10 PROCEDURE — 93623 PRGRMD STIMJ&PACG IV RX NFS: CPT | Performed by: INTERNAL MEDICINE

## 2024-10-10 PROCEDURE — C1732 CATH, EP, DIAG/ABL, 3D/VECT: HCPCS | Performed by: INTERNAL MEDICINE

## 2024-10-10 PROCEDURE — 4A023FZ MEASUREMENT OF CARDIAC RHYTHM, PERCUTANEOUS APPROACH: ICD-10-PCS | Performed by: INTERNAL MEDICINE

## 2024-10-10 PROCEDURE — 96367 TX/PROPH/DG ADDL SEQ IV INF: CPT

## 2024-10-10 PROCEDURE — 3700000000 HC ANESTHESIA ATTENDED CARE: Performed by: INTERNAL MEDICINE

## 2024-10-10 PROCEDURE — 80053 COMPREHEN METABOLIC PANEL: CPT

## 2024-10-10 PROCEDURE — 93655 ICAR CATH ABLTJ DSCRT ARRHYT: CPT | Performed by: INTERNAL MEDICINE

## 2024-10-10 PROCEDURE — 6370000000 HC RX 637 (ALT 250 FOR IP): Performed by: INTERNAL MEDICINE

## 2024-10-10 PROCEDURE — 99255 IP/OBS CONSLTJ NEW/EST HI 80: CPT | Performed by: STUDENT IN AN ORGANIZED HEALTH CARE EDUCATION/TRAINING PROGRAM

## 2024-10-10 PROCEDURE — 2500000003 HC RX 250 WO HCPCS: Performed by: INTERNAL MEDICINE

## 2024-10-10 PROCEDURE — 2000000000 HC ICU R&B

## 2024-10-10 PROCEDURE — C1893 INTRO/SHEATH, FIXED,NON-PEEL: HCPCS | Performed by: INTERNAL MEDICINE

## 2024-10-10 PROCEDURE — 6360000002 HC RX W HCPCS: Performed by: NURSE ANESTHETIST, CERTIFIED REGISTERED

## 2024-10-10 PROCEDURE — 4A0234Z MEASUREMENT OF CARDIAC ELECTRICAL ACTIVITY, PERCUTANEOUS APPROACH: ICD-10-PCS | Performed by: INTERNAL MEDICINE

## 2024-10-10 PROCEDURE — 02583ZZ DESTRUCTION OF CONDUCTION MECHANISM, PERCUTANEOUS APPROACH: ICD-10-PCS | Performed by: INTERNAL MEDICINE

## 2024-10-10 PROCEDURE — 2580000003 HC RX 258: Performed by: NURSE ANESTHETIST, CERTIFIED REGISTERED

## 2024-10-10 PROCEDURE — 82947 ASSAY GLUCOSE BLOOD QUANT: CPT

## 2024-10-10 PROCEDURE — 82962 GLUCOSE BLOOD TEST: CPT

## 2024-10-10 PROCEDURE — 2500000003 HC RX 250 WO HCPCS

## 2024-10-10 PROCEDURE — 7100000000 HC PACU RECOVERY - FIRST 15 MIN

## 2024-10-10 PROCEDURE — 93010 ELECTROCARDIOGRAM REPORT: CPT | Performed by: INTERNAL MEDICINE

## 2024-10-10 PROCEDURE — 6360000002 HC RX W HCPCS: Performed by: INTERNAL MEDICINE

## 2024-10-10 PROCEDURE — 2720000010 HC SURG SUPPLY STERILE: Performed by: INTERNAL MEDICINE

## 2024-10-10 PROCEDURE — 2580000003 HC RX 258: Performed by: STUDENT IN AN ORGANIZED HEALTH CARE EDUCATION/TRAINING PROGRAM

## 2024-10-10 PROCEDURE — C1730 CATH, EP, 19 OR FEW ELECT: HCPCS | Performed by: INTERNAL MEDICINE

## 2024-10-10 PROCEDURE — 94640 AIRWAY INHALATION TREATMENT: CPT

## 2024-10-10 RX ORDER — SODIUM CHLORIDE 0.9 % (FLUSH) 0.9 %
5-40 SYRINGE (ML) INJECTION EVERY 12 HOURS SCHEDULED
Status: DISCONTINUED | OUTPATIENT
Start: 2024-10-10 | End: 2024-10-17 | Stop reason: HOSPADM

## 2024-10-10 RX ORDER — PANTOPRAZOLE SODIUM 40 MG/1
40 TABLET, DELAYED RELEASE ORAL DAILY
Status: DISCONTINUED | OUTPATIENT
Start: 2024-10-10 | End: 2024-10-17 | Stop reason: HOSPADM

## 2024-10-10 RX ORDER — METOPROLOL SUCCINATE 50 MG/1
50 TABLET, EXTENDED RELEASE ORAL DAILY
Status: DISCONTINUED | OUTPATIENT
Start: 2024-10-11 | End: 2024-10-11

## 2024-10-10 RX ORDER — SODIUM CHLORIDE 0.9 % (FLUSH) 0.9 %
5-40 SYRINGE (ML) INJECTION PRN
Status: DISCONTINUED | OUTPATIENT
Start: 2024-10-10 | End: 2024-10-17 | Stop reason: HOSPADM

## 2024-10-10 RX ORDER — LIDOCAINE HYDROCHLORIDE 10 MG/ML
INJECTION, SOLUTION INFILTRATION; PERINEURAL PRN
Status: DISCONTINUED | OUTPATIENT
Start: 2024-10-10 | End: 2024-10-10 | Stop reason: HOSPADM

## 2024-10-10 RX ORDER — EZETIMIBE 10 MG/1
10 TABLET ORAL DAILY
Status: DISCONTINUED | OUTPATIENT
Start: 2024-10-10 | End: 2024-10-17 | Stop reason: HOSPADM

## 2024-10-10 RX ORDER — ONDANSETRON 4 MG/1
4 TABLET, ORALLY DISINTEGRATING ORAL EVERY 8 HOURS PRN
Status: DISCONTINUED | OUTPATIENT
Start: 2024-10-10 | End: 2024-10-10

## 2024-10-10 RX ORDER — ASPIRIN 81 MG/1
81 TABLET ORAL DAILY
Status: DISCONTINUED | OUTPATIENT
Start: 2024-10-10 | End: 2024-10-17 | Stop reason: HOSPADM

## 2024-10-10 RX ORDER — MIDAZOLAM HYDROCHLORIDE 1 MG/ML
INJECTION INTRAMUSCULAR; INTRAVENOUS
Status: DISCONTINUED | OUTPATIENT
Start: 2024-10-10 | End: 2024-10-10 | Stop reason: SDUPTHER

## 2024-10-10 RX ORDER — ENOXAPARIN SODIUM 100 MG/ML
30 INJECTION SUBCUTANEOUS DAILY
Status: DISCONTINUED | OUTPATIENT
Start: 2024-10-11 | End: 2024-10-10 | Stop reason: ALTCHOICE

## 2024-10-10 RX ORDER — M-VIT,TX,IRON,MINS/CALC/FOLIC 27MG-0.4MG
1 TABLET ORAL DAILY
Status: DISCONTINUED | OUTPATIENT
Start: 2024-10-10 | End: 2024-10-17 | Stop reason: HOSPADM

## 2024-10-10 RX ORDER — INSULIN LISPRO 100 [IU]/ML
0-4 INJECTION, SOLUTION INTRAVENOUS; SUBCUTANEOUS
Status: DISCONTINUED | OUTPATIENT
Start: 2024-10-10 | End: 2024-10-17 | Stop reason: HOSPADM

## 2024-10-10 RX ORDER — ENOXAPARIN SODIUM 100 MG/ML
30 INJECTION SUBCUTANEOUS DAILY
Status: DISCONTINUED | OUTPATIENT
Start: 2024-10-10 | End: 2024-10-10

## 2024-10-10 RX ORDER — MIDODRINE HYDROCHLORIDE 5 MG/1
5 TABLET ORAL
Status: DISCONTINUED | OUTPATIENT
Start: 2024-10-10 | End: 2024-10-17 | Stop reason: HOSPADM

## 2024-10-10 RX ORDER — FENTANYL CITRATE 50 UG/ML
INJECTION, SOLUTION INTRAMUSCULAR; INTRAVENOUS
Status: DISCONTINUED | OUTPATIENT
Start: 2024-10-10 | End: 2024-10-10 | Stop reason: SDUPTHER

## 2024-10-10 RX ORDER — BUPROPION HYDROCHLORIDE 150 MG/1
150 TABLET ORAL EVERY MORNING
Status: DISCONTINUED | OUTPATIENT
Start: 2024-10-11 | End: 2024-10-17 | Stop reason: HOSPADM

## 2024-10-10 RX ORDER — BUPROPION HYDROCHLORIDE 300 MG/1
300 TABLET ORAL EVERY MORNING
Status: DISCONTINUED | OUTPATIENT
Start: 2024-10-11 | End: 2024-10-10

## 2024-10-10 RX ORDER — GLUCAGON 1 MG/ML
1 KIT INJECTION PRN
Status: DISCONTINUED | OUTPATIENT
Start: 2024-10-10 | End: 2024-10-17 | Stop reason: HOSPADM

## 2024-10-10 RX ORDER — ACETAMINOPHEN 325 MG/1
650 TABLET ORAL EVERY 6 HOURS PRN
Status: DISCONTINUED | OUTPATIENT
Start: 2024-10-10 | End: 2024-10-17 | Stop reason: HOSPADM

## 2024-10-10 RX ORDER — AMIODARONE HYDROCHLORIDE 200 MG/1
200 TABLET ORAL EVERY 8 HOURS
Status: DISCONTINUED | OUTPATIENT
Start: 2024-10-10 | End: 2024-10-17 | Stop reason: HOSPADM

## 2024-10-10 RX ORDER — ISOSORBIDE MONONITRATE 30 MG/1
30 TABLET, EXTENDED RELEASE ORAL DAILY
Status: DISCONTINUED | OUTPATIENT
Start: 2024-10-11 | End: 2024-10-11

## 2024-10-10 RX ORDER — DEXTROSE MONOHYDRATE 100 MG/ML
INJECTION, SOLUTION INTRAVENOUS CONTINUOUS PRN
Status: DISCONTINUED | OUTPATIENT
Start: 2024-10-10 | End: 2024-10-17 | Stop reason: HOSPADM

## 2024-10-10 RX ORDER — ATORVASTATIN CALCIUM 80 MG/1
80 TABLET, FILM COATED ORAL DAILY
Status: DISCONTINUED | OUTPATIENT
Start: 2024-10-10 | End: 2024-10-17 | Stop reason: HOSPADM

## 2024-10-10 RX ORDER — ONDANSETRON 2 MG/ML
4 INJECTION INTRAMUSCULAR; INTRAVENOUS EVERY 6 HOURS PRN
Status: DISCONTINUED | OUTPATIENT
Start: 2024-10-10 | End: 2024-10-10

## 2024-10-10 RX ORDER — CLOPIDOGREL BISULFATE 75 MG/1
75 TABLET ORAL DAILY
Status: DISCONTINUED | OUTPATIENT
Start: 2024-10-10 | End: 2024-10-17 | Stop reason: HOSPADM

## 2024-10-10 RX ORDER — ACETAMINOPHEN 650 MG/1
650 SUPPOSITORY RECTAL EVERY 6 HOURS PRN
Status: DISCONTINUED | OUTPATIENT
Start: 2024-10-10 | End: 2024-10-17 | Stop reason: HOSPADM

## 2024-10-10 RX ORDER — POLYETHYLENE GLYCOL 3350 17 G/17G
17 POWDER, FOR SOLUTION ORAL DAILY PRN
Status: DISCONTINUED | OUTPATIENT
Start: 2024-10-10 | End: 2024-10-17 | Stop reason: HOSPADM

## 2024-10-10 RX ORDER — SODIUM CHLORIDE 9 MG/ML
INJECTION, SOLUTION INTRAVENOUS
Status: DISCONTINUED | OUTPATIENT
Start: 2024-10-10 | End: 2024-10-10 | Stop reason: SDUPTHER

## 2024-10-10 RX ORDER — CHOLECALCIFEROL (VITAMIN D3) 50 MCG
2000 TABLET ORAL DAILY
Status: DISCONTINUED | OUTPATIENT
Start: 2024-10-10 | End: 2024-10-17 | Stop reason: HOSPADM

## 2024-10-10 RX ORDER — SODIUM CHLORIDE 9 MG/ML
INJECTION, SOLUTION INTRAVENOUS PRN
Status: DISCONTINUED | OUTPATIENT
Start: 2024-10-10 | End: 2024-10-17 | Stop reason: HOSPADM

## 2024-10-10 RX ORDER — HEPARIN SODIUM 5000 [USP'U]/ML
5000 INJECTION, SOLUTION INTRAVENOUS; SUBCUTANEOUS EVERY 8 HOURS SCHEDULED
Status: DISCONTINUED | OUTPATIENT
Start: 2024-10-11 | End: 2024-10-17 | Stop reason: HOSPADM

## 2024-10-10 RX ADMIN — ARFORMOTEROL TARTRATE: 15 SOLUTION RESPIRATORY (INHALATION) at 11:06

## 2024-10-10 RX ADMIN — AMIODARONE HYDROCHLORIDE 0.5 MG/MIN: 1.8 INJECTION, SOLUTION INTRAVENOUS at 01:12

## 2024-10-10 RX ADMIN — SODIUM CHLORIDE, PRESERVATIVE FREE 10 ML: 5 INJECTION INTRAVENOUS at 20:38

## 2024-10-10 RX ADMIN — SODIUM CHLORIDE: 9 INJECTION, SOLUTION INTRAVENOUS at 14:31

## 2024-10-10 RX ADMIN — IPRATROPIUM BROMIDE 0.5 MG: 0.5 SOLUTION RESPIRATORY (INHALATION) at 11:07

## 2024-10-10 RX ADMIN — Medication 16 G: at 20:39

## 2024-10-10 RX ADMIN — FENTANYL CITRATE 5 MCG: 50 INJECTION, SOLUTION INTRAMUSCULAR; INTRAVENOUS at 16:42

## 2024-10-10 RX ADMIN — WATER 1000 MG: 1 INJECTION INTRAMUSCULAR; INTRAVENOUS; SUBCUTANEOUS at 11:02

## 2024-10-10 RX ADMIN — SODIUM CHLORIDE, PRESERVATIVE FREE 10 ML: 5 INJECTION INTRAVENOUS at 11:03

## 2024-10-10 RX ADMIN — DOXYCYCLINE 100 MG: 100 INJECTION, POWDER, LYOPHILIZED, FOR SOLUTION INTRAVENOUS at 20:06

## 2024-10-10 RX ADMIN — CEFEPIME 1000 MG: 1 INJECTION, POWDER, FOR SOLUTION INTRAMUSCULAR; INTRAVENOUS at 12:51

## 2024-10-10 RX ADMIN — ARFORMOTEROL TARTRATE: 15 SOLUTION RESPIRATORY (INHALATION) at 20:28

## 2024-10-10 RX ADMIN — FENTANYL CITRATE 10 MCG: 50 INJECTION, SOLUTION INTRAMUSCULAR; INTRAVENOUS at 16:26

## 2024-10-10 RX ADMIN — MIDAZOLAM 1 MG: 1 INJECTION INTRAMUSCULAR; INTRAVENOUS at 15:00

## 2024-10-10 RX ADMIN — FENTANYL CITRATE 5 MCG: 50 INJECTION, SOLUTION INTRAMUSCULAR; INTRAVENOUS at 16:52

## 2024-10-10 RX ADMIN — IPRATROPIUM BROMIDE 0.5 MG: 0.5 SOLUTION RESPIRATORY (INHALATION) at 20:28

## 2024-10-10 RX ADMIN — AMIODARONE HYDROCHLORIDE 200 MG: 200 TABLET ORAL at 20:00

## 2024-10-10 RX ADMIN — ENOXAPARIN SODIUM 30 MG: 100 INJECTION SUBCUTANEOUS at 11:03

## 2024-10-10 ASSESSMENT — PAIN SCALES - GENERAL
PAINLEVEL_OUTOF10: 8
PAINLEVEL_OUTOF10: 0

## 2024-10-10 ASSESSMENT — PAIN DESCRIPTION - PAIN TYPE: TYPE: ACUTE PAIN

## 2024-10-10 ASSESSMENT — PAIN DESCRIPTION - ORIENTATION: ORIENTATION: POSTERIOR

## 2024-10-10 ASSESSMENT — PAIN SCALES - WONG BAKER
WONGBAKER_NUMERICALRESPONSE: NO HURT
WONGBAKER_NUMERICALRESPONSE: NO HURT

## 2024-10-10 ASSESSMENT — PAIN DESCRIPTION - DESCRIPTORS: DESCRIPTORS: ACHING;DISCOMFORT

## 2024-10-10 ASSESSMENT — PAIN DESCRIPTION - LOCATION: LOCATION: HEAD

## 2024-10-10 ASSESSMENT — ENCOUNTER SYMPTOMS: SHORTNESS OF BREATH: 1

## 2024-10-10 ASSESSMENT — PAIN - FUNCTIONAL ASSESSMENT: PAIN_FUNCTIONAL_ASSESSMENT: NONE - DENIES PAIN

## 2024-10-10 NOTE — H&P
bilateral pleural effusions.       ASSESSMENT AND PLAN:    Principal Problem:    AVNRT (AV mio re-entry tachycardia) (HCC)  Resolved Problems:    * No resolved hospital problems. *  CAD status post C with PCI/overlapping MARTIN x 2 from mid to proximal/ostial RCA 9/3/2024   Ischemic cardiomyopathy  COPD  Community-acquired pneumonia  Hyperlipidemia  Diabetes mellitus type 2  ESRD  Major depressive disorder    Plan:    -Admit to CVICU  -EP consulted for further evaluation and management  -Continue DAPT, Toprol, statin  -GDMT limited due to hypotension, ESRD  -Continue breathing treatments for COPD  -Chest x-ray suggestive of pneumonia, since the patient has recent history of hospitalization will start him on cefepime and doxycycline.   -Continue atorvastatin, Zetia  -Low-dose sliding scale insulin, fingerstick monitoring, hypoglycemia protocol  -ESRD on dialysis, nephrology consulted  -Patient was on Wellbutrin 300 Mg p.o. daily, which seems to be high-dose due to ESRD and hemodialysis, will reduce the dose to 150 Mg p.o. daily      Diet: Diet NPO  Code Status: Full Code  Surrogate decision maker confirmed with patient:   Extended Emergency Contact Information  Primary Emergency Contact: Dawn David  Home Phone: [phone]  Mobile Phone: [phone]  Relation: Brother/Sister  Secondary Emergency Contact: Ezio Petersen  Home Phone: [phone]  Relation: Friend  Preferred language: English   needed? No    DVT Prophylaxis: [x]Lovenox []Heparin []PCD [] Warfarin/NOAC []Encouraged ambulation  Disposition: []Med/Surg [] Intermediate [x] ICU/CCU  Admit status: [] Observation [x] Inpatient     +++++++++++++++++++++++++++++++++++++++++++++++++  Maria Luisa Limon MD  [place], [place]  +++++++++++++++++++++++++++++++++++++++++++++++++  NOTE: This report was transcribed using voice recognition software. Every effort was made to ensure accuracy;

## 2024-10-10 NOTE — DISCHARGE INSTRUCTIONS
[facility] Ablation Patient Discharge Instructions    Medication changes: Start amiodarone 200 mg tablet, take 1 tablet by mouth 3 times a day with food until 10/18/2024, then take 1 tablet by mouth daily thereafter.  Prescription sent to your [facility] pharmacy.  - Note: Additional medication changes may have been made by other providers, so please see their separate discharge instructions for further details.    LifeVest: wear as recommended by .    Follow-Up:   Dr Monique office appointment: You are currently scheduled on Friday 12/20/24 at 2:00 pm for a follow-up evaluation.  If you need to reschedule this appointment please call the [facility] office; [phone].   Echocardiogram: You will be seen on Tuesday 12/3/24 at 10:00 am for an echocardiogram.   Please report to [address], located behind [facility] in [place]. On Arrival, enter through main entrance, enter through door on left. Check-in for your appointment.                                                                                       Echocardiogram Instructions:  - Please arrive 15 minutes prior to procedure.   - No lotion, creams, or powders on the chest.   - Appointment is approximately 1 hour.    Questions/concerns: It is not uncommon for patients to experience brief episodes of palpitations, but please call the [facility] office if you are having frequent symptoms.     Incision Care: Check bilateral groins daily. No soaking in a bathtub, hot tub, or pool for one week. You may shower.  If you find any redness, swelling, drainage, warmth, or have a fever greater than 100 degrees, notify the office immediately at [phone].     Activity: No lifting greater than 5 lbs for 7 days, and no strenuous exercise for 2 weeks.    Driving: No driving or operating heavy machinery for 48 hours. After 48 hours, you may drive if you feel up to it. DO NOT drive until you

## 2024-10-10 NOTE — ED NOTES
Patient started to desaturate, placed back on oxygen at 3 lpm nc, oxygen saturation prior to oxygen was 86% RA. Pediatric probe placed to right ear due to patient red nail polish. On finger would  at times not consistently, consistent right ear.

## 2024-10-10 NOTE — ANESTHESIA PRE PROCEDURE
Acute renal failure (ARF) ([facility]) N17.9    Acute decompensated heart failure ([facility]) I50.9    Shortness of breath R06.02    NICM (nonischemic cardiomyopathy) ([facility]) I42.8    Moderate protein-calorie malnutrition ([facility]) E44.0    CAD (coronary artery disease) I25.10    NSTEMI (non-ST elevated myocardial infarction) ([facility]) I21.4    SVT (supraventricular tachycardia) ([facility]) I47.10    Chronic combined systolic and diastolic congestive heart failure ([facility]) I50.42    Ischemic cardiomyopathy I25.5    ESRD (end stage renal disease) ([facility]) N18.6    AVNRT (AV mio re-entry tachycardia) ([facility]) I47.19       Past Medical History:  No past medical history on file.    Past Surgical History:        Procedure Laterality Date    CARDIAC PROCEDURE N/A 9/3/2024    Left heart cath / coronary angiography performed by Talha Vu MD at [facility]    CARDIAC PROCEDURE N/A 9/3/2024    Percutaneous coronary intervention performed by Talha Vu MD at [facility]ARDI[facility] C[facility]    CARDIAC PROCEDURE N/A 9/3/2024    Insert stent alfie coronary performed by Talha Vu MD at [facility]ARDI[facility] C[facility]       Social History:    Social History     Tobacco Use    Smoking status: Every Day     Current packs/day: 0.50     Types: Cigarettes    Smokeless tobacco: Never   Substance Use Topics    Alcohol use: Never                                Ready to quit: Not Answered  Counseling given: Not Answered      Vital Signs (Current):   Vitals:    10/10/24 0900 10/10/24 1023 10/10/24 1041 10/10/24 1100   BP: 93/70 (!) 86/66  91/74   Pulse: 78 80 78 75   Resp: 23 17 15 13   Temp:       TempSrc:       SpO2:       Weight:       Height:                                                  BP Readings from Last 3 Encounters:   10/10/24 91/74   10/10/24 111/69   10/07/24 (!) 115/59       NPO Status:                                                                                 BMI:   Wt Readings from Last 3 Encounters:   10/10/24 56.6 kg (124 lb 12.5 oz)

## 2024-10-11 ENCOUNTER — TELEPHONE (OUTPATIENT)
Dept: NON INVASIVE DIAGNOSTICS | Age: 74
End: 2024-10-11

## 2024-10-11 DIAGNOSIS — R94.31 ABNORMAL EKG: Primary | ICD-10-CM

## 2024-10-11 LAB
ANION GAP SERPL CALCULATED.3IONS-SCNC: 17 MMOL/L (ref 7–16)
BASOPHILS # BLD: 0 K/UL (ref 0–0.2)
BASOPHILS NFR BLD: 0 % (ref 0–2)
BUN SERPL-MCNC: 34 MG/DL (ref 6–23)
CALCIUM SERPL-MCNC: 8.2 MG/DL (ref 8.6–10.2)
CHLORIDE SERPL-SCNC: 105 MMOL/L (ref 98–107)
CO2 SERPL-SCNC: 19 MMOL/L (ref 22–29)
CREAT SERPL-MCNC: 5.5 MG/DL (ref 0.7–1.2)
EOSINOPHIL # BLD: 0 K/UL (ref 0.05–0.5)
EOSINOPHILS RELATIVE PERCENT: 0 % (ref 0–6)
ERYTHROCYTE [DISTWIDTH] IN BLOOD BY AUTOMATED COUNT: 16.1 % (ref 11.5–15)
GFR, ESTIMATED: 10 ML/MIN/1.73M2
GLUCOSE BLD-MCNC: 108 MG/DL (ref 74–99)
GLUCOSE BLD-MCNC: 127 MG/DL (ref 74–99)
GLUCOSE BLD-MCNC: 143 MG/DL (ref 74–99)
GLUCOSE BLD-MCNC: 145 MG/DL (ref 74–99)
GLUCOSE SERPL-MCNC: 132 MG/DL (ref 74–99)
HCT VFR BLD AUTO: 30.3 % (ref 37–54)
HGB BLD-MCNC: 8.9 G/DL (ref 12.5–16.5)
LYMPHOCYTES NFR BLD: 0.14 K/UL (ref 1.5–4)
LYMPHOCYTES RELATIVE PERCENT: 2 % (ref 20–42)
MCH RBC QN AUTO: 28.9 PG (ref 26–35)
MCHC RBC AUTO-ENTMCNC: 29.4 G/DL (ref 32–34.5)
MCV RBC AUTO: 98.4 FL (ref 80–99.9)
MONOCYTES NFR BLD: 0.27 K/UL (ref 0.1–0.95)
MONOCYTES NFR BLD: 4 % (ref 2–12)
NEUTROPHILS NFR BLD: 95 % (ref 43–80)
NEUTS SEG NFR BLD: 7.39 K/UL (ref 1.8–7.3)
PHOSPHATE SERPL-MCNC: 5.4 MG/DL (ref 2.5–4.5)
PLATELET # BLD AUTO: 220 K/UL (ref 130–450)
PMV BLD AUTO: 10.9 FL (ref 7–12)
POTASSIUM SERPL-SCNC: 4 MMOL/L (ref 3.5–5)
RBC # BLD AUTO: 3.08 M/UL (ref 3.8–5.8)
RBC # BLD: ABNORMAL 10*6/UL
SODIUM SERPL-SCNC: 141 MMOL/L (ref 132–146)
WBC # BLD: ABNORMAL 10*3/UL
WBC OTHER # BLD: 7.8 K/UL (ref 4.5–11.5)

## 2024-10-11 PROCEDURE — 6370000000 HC RX 637 (ALT 250 FOR IP): Performed by: STUDENT IN AN ORGANIZED HEALTH CARE EDUCATION/TRAINING PROGRAM

## 2024-10-11 PROCEDURE — 5A1D70Z PERFORMANCE OF URINARY FILTRATION, INTERMITTENT, LESS THAN 6 HOURS PER DAY: ICD-10-PCS | Performed by: STUDENT IN AN ORGANIZED HEALTH CARE EDUCATION/TRAINING PROGRAM

## 2024-10-11 PROCEDURE — 82962 GLUCOSE BLOOD TEST: CPT

## 2024-10-11 PROCEDURE — 2580000003 HC RX 258: Performed by: INTERNAL MEDICINE

## 2024-10-11 PROCEDURE — 99232 SBSQ HOSP IP/OBS MODERATE 35: CPT | Performed by: STUDENT IN AN ORGANIZED HEALTH CARE EDUCATION/TRAINING PROGRAM

## 2024-10-11 PROCEDURE — 6370000000 HC RX 637 (ALT 250 FOR IP): Performed by: INTERNAL MEDICINE

## 2024-10-11 PROCEDURE — 51798 US URINE CAPACITY MEASURE: CPT

## 2024-10-11 PROCEDURE — 85025 COMPLETE CBC W/AUTO DIFF WBC: CPT

## 2024-10-11 PROCEDURE — 94640 AIRWAY INHALATION TREATMENT: CPT

## 2024-10-11 PROCEDURE — 6360000002 HC RX W HCPCS: Performed by: INTERNAL MEDICINE

## 2024-10-11 PROCEDURE — 2700000000 HC OXYGEN THERAPY PER DAY

## 2024-10-11 PROCEDURE — 2500000003 HC RX 250 WO HCPCS: Performed by: INTERNAL MEDICINE

## 2024-10-11 PROCEDURE — 90935 HEMODIALYSIS ONE EVALUATION: CPT

## 2024-10-11 PROCEDURE — 80048 BASIC METABOLIC PNL TOTAL CA: CPT

## 2024-10-11 PROCEDURE — 2140000000 HC CCU INTERMEDIATE R&B

## 2024-10-11 PROCEDURE — 84100 ASSAY OF PHOSPHORUS: CPT

## 2024-10-11 RX ORDER — NICOTINE 21 MG/24HR
1 PATCH, TRANSDERMAL 24 HOURS TRANSDERMAL DAILY
Status: DISCONTINUED | OUTPATIENT
Start: 2024-10-11 | End: 2024-10-17 | Stop reason: HOSPADM

## 2024-10-11 RX ORDER — AMIODARONE HYDROCHLORIDE 200 MG/1
TABLET ORAL
Qty: 90 TABLET | Refills: 1 | Status: SHIPPED | OUTPATIENT
Start: 2024-10-11

## 2024-10-11 RX ORDER — NOREPINEPHRINE BITARTRATE 0.06 MG/ML
1-100 INJECTION, SOLUTION INTRAVENOUS CONTINUOUS
Status: DISCONTINUED | OUTPATIENT
Start: 2024-10-11 | End: 2024-10-11

## 2024-10-11 RX ORDER — ISOSORBIDE MONONITRATE 30 MG/1
30 TABLET, EXTENDED RELEASE ORAL DAILY
Status: DISCONTINUED | OUTPATIENT
Start: 2024-10-11 | End: 2024-10-17 | Stop reason: HOSPADM

## 2024-10-11 RX ORDER — METOPROLOL SUCCINATE 50 MG/1
50 TABLET, EXTENDED RELEASE ORAL DAILY
Status: DISCONTINUED | OUTPATIENT
Start: 2024-10-11 | End: 2024-10-17 | Stop reason: HOSPADM

## 2024-10-11 RX ADMIN — EZETIMIBE 10 MG: 10 TABLET ORAL at 08:52

## 2024-10-11 RX ADMIN — METOPROLOL SUCCINATE 50 MG: 50 TABLET, EXTENDED RELEASE ORAL at 16:09

## 2024-10-11 RX ADMIN — Medication 2000 UNITS: at 08:52

## 2024-10-11 RX ADMIN — DOXYCYCLINE 100 MG: 100 INJECTION, POWDER, LYOPHILIZED, FOR SOLUTION INTRAVENOUS at 20:25

## 2024-10-11 RX ADMIN — ARFORMOTEROL TARTRATE: 15 SOLUTION RESPIRATORY (INHALATION) at 20:11

## 2024-10-11 RX ADMIN — BUPROPION HYDROCHLORIDE 150 MG: 150 TABLET, EXTENDED RELEASE ORAL at 08:52

## 2024-10-11 RX ADMIN — MIDODRINE HYDROCHLORIDE 5 MG: 5 TABLET ORAL at 17:31

## 2024-10-11 RX ADMIN — CLOPIDOGREL BISULFATE 75 MG: 75 TABLET ORAL at 08:52

## 2024-10-11 RX ADMIN — PETROLATUM: 420 OINTMENT TOPICAL at 20:27

## 2024-10-11 RX ADMIN — HEPARIN SODIUM 5000 UNITS: 5000 INJECTION INTRAVENOUS; SUBCUTANEOUS at 21:30

## 2024-10-11 RX ADMIN — HEPARIN SODIUM 5000 UNITS: 5000 INJECTION INTRAVENOUS; SUBCUTANEOUS at 14:43

## 2024-10-11 RX ADMIN — IPRATROPIUM BROMIDE 0.5 MG: 0.5 SOLUTION RESPIRATORY (INHALATION) at 20:11

## 2024-10-11 RX ADMIN — SODIUM CHLORIDE, PRESERVATIVE FREE 10 ML: 5 INJECTION INTRAVENOUS at 20:28

## 2024-10-11 RX ADMIN — ASPIRIN 81 MG: 81 TABLET, COATED ORAL at 08:52

## 2024-10-11 RX ADMIN — SODIUM CHLORIDE, PRESERVATIVE FREE 10 ML: 5 INJECTION INTRAVENOUS at 08:54

## 2024-10-11 RX ADMIN — ISOSORBIDE MONONITRATE 30 MG: 30 TABLET, EXTENDED RELEASE ORAL at 16:08

## 2024-10-11 RX ADMIN — Medication 1 TABLET: at 08:51

## 2024-10-11 RX ADMIN — ATORVASTATIN CALCIUM 80 MG: 80 TABLET, FILM COATED ORAL at 11:06

## 2024-10-11 RX ADMIN — PANTOPRAZOLE SODIUM 40 MG: 40 TABLET, DELAYED RELEASE ORAL at 06:33

## 2024-10-11 RX ADMIN — CEFEPIME 1000 MG: 1 INJECTION, POWDER, FOR SOLUTION INTRAMUSCULAR; INTRAVENOUS at 14:36

## 2024-10-11 RX ADMIN — AMIODARONE HYDROCHLORIDE 200 MG: 200 TABLET ORAL at 18:32

## 2024-10-11 RX ADMIN — IPRATROPIUM BROMIDE 0.5 MG: 0.5 SOLUTION RESPIRATORY (INHALATION) at 08:31

## 2024-10-11 RX ADMIN — HEPARIN SODIUM 5000 UNITS: 5000 INJECTION INTRAVENOUS; SUBCUTANEOUS at 06:30

## 2024-10-11 RX ADMIN — DOXYCYCLINE 100 MG: 100 INJECTION, POWDER, LYOPHILIZED, FOR SOLUTION INTRAVENOUS at 08:43

## 2024-10-11 RX ADMIN — SODIUM CHLORIDE, PRESERVATIVE FREE 10 ML: 5 INJECTION INTRAVENOUS at 08:55

## 2024-10-11 RX ADMIN — MIDODRINE HYDROCHLORIDE 5 MG: 5 TABLET ORAL at 12:20

## 2024-10-11 RX ADMIN — AMIODARONE HYDROCHLORIDE 200 MG: 200 TABLET ORAL at 03:14

## 2024-10-11 RX ADMIN — PETROLATUM: 420 OINTMENT TOPICAL at 16:10

## 2024-10-11 RX ADMIN — POLYETHYLENE GLYCOL 3350 17 G: 17 POWDER, FOR SOLUTION ORAL at 18:35

## 2024-10-11 RX ADMIN — MIDODRINE HYDROCHLORIDE 5 MG: 5 TABLET ORAL at 08:35

## 2024-10-11 RX ADMIN — ARFORMOTEROL TARTRATE: 15 SOLUTION RESPIRATORY (INHALATION) at 08:30

## 2024-10-11 RX ADMIN — AMIODARONE HYDROCHLORIDE 200 MG: 200 TABLET ORAL at 11:02

## 2024-10-11 ASSESSMENT — PAIN SCALES - GENERAL
PAINLEVEL_OUTOF10: 0

## 2024-10-11 ASSESSMENT — PAIN SCALES - WONG BAKER
WONGBAKER_NUMERICALRESPONSE: NO HURT

## 2024-10-11 NOTE — FLOWSHEET NOTE
Inpatient Wound Care (Initial consult) 9343    Admit Date: 10/10/2024  7:11 AM    Reason for consult:  Coccyx    Significant history: Per H&P    History Of Present Illness:       Mr. Deion Knight, a 74 y.o. year old male  who has past medical history of ESRD,  CAD status post [facility] with PCI and MARTIN x 2, ischemic cardiomyopathy, h/o hypertension, now hypotensive, GDMT limited due to ESRD and hypotension.  Patient presented to [facility] ED with concerns of shortness of breath while he was at dialysis, EMS was called for shortness of breath and hypotension his BP was recorded to be 80/60 mmHg.  In ED workup EKG was suggestive of AVNRT with heart rate up to 207, hypertension, was given IV fluids, adenosine followed by amiodarone bolus was given with unsuccessful response, he was cardioverted using 100 J however 10 minutes later reverted back to AVNRT, was cardioverted again using 125 J and was placed on amiodarone drip.    Findings:     10/11/24 1030   Skin Integumentary    Skin Integrity Ecchymosis   Location BUE   Wound 10/10/24 Buttocks Right   Date First Assessed/Time First Assessed: 10/10/24 1140   Present on Original Admission: Yes  Location: Buttocks  Wound Location Orientation: Right   Wound Image    Wound Etiology Pressure Stage 2   Dressing/Treatment Pharmaceutical agent (see MAR)   Wound Length (cm) 0.3 cm   Wound Width (cm) 0.3 cm   Wound Depth (cm) 0.1 cm   Wound Surface Area (cm^2) 0.09 cm^2   Change in Wound Size % (l*w) 0   Wound Volume (cm^3) 0.009 cm^3   Wound Healing % 0   Wound Assessment Pink/red   Drainage Amount None (dry)   Odor None   Kasia-wound Assessment Dry/flaky       **Informed Consent**    The patient has given verbal consent to have photos taken of wound and inserted into their chart as part of their permanent medical record for purposes of documentation, treatment management and/or medical review.   All Images taken on 10/11/24 of patient name: Deion Knight were transmitted and stored on

## 2024-10-11 NOTE — TELEPHONE ENCOUNTER
----- Message from Dr. Jose Antonio Monique DO sent at 10/11/2024 11:28 AM EDT -----  Regarding: TTE, appt  Please arrange:   1. TTE in early 12/2024.  2. Change EP appt to mid-late 12/2024 (currently scheduled 1/2025).    -Jose Antonio Monique DO

## 2024-10-11 NOTE — CARE COORDINATION
Deion Knight #[id number] (CSN:[id number]) (:1950 74 y.o. M) (Adm: 10/10/24)  SEY[facility] 3NE [id number]  PCP    THEO BURDEN  Demographics  CommentAddress   [address] [place] [address]    Home Phone   [phone]    Work Phone       Mobile Phone   [phone]             Social Security Number       Insurance Information   [employer]    Marital Status   Single    Catholic   Lutheran               Insurance Payors as of 10/11/2024    [employer]    Plan: [employer] Member: [id number] Effective from: 3/27/2014   Subscriber: DEION KNIGHT Subscriber ID: [id number] Guarantor: DEION KNIGHT     MEDICARE    Plan: MEDICARE PART A AND B Member: [id number] Effective from: 2015   Subscriber: DEION KNIGHT Subscriber ID: [id number] Guarantor: DEION KNIGHT     Documents Filed to Patient    Power of  Living Will Clinical Unknown Study Attachment Consent Form ABN Waiver After Visit Summary Lab Result Scan Code Status MyChart Status Advance Care Planning    Not on File  Not on File  Not on File  Not on File  Not on File  Not on File  Filed  Not on File  FULL [Updated on 10/10/24 0748]  Pending Jump to the Activity      Auth/Cert Information    Open auth/cert linked to hospital account [id number]      Emergency Contacts    Name Relation Home Work Mobile   Dawn David Brother/Sister [phone]   Ezio Petersen Friend [phone]       Other Contacts    None on File    Admission Information    Current Information    Attending Provider Admitting Provider Admission Type Admission Status   Juan Moss MD Khanal, Anuja, MD Urgent Confirmed Admission          Admission Date/Time Discharge Date Hospital Service Auth/Cert Status   10/10/24  0711  Medical [facility]          Hospital Area Unit Room/Bed    [facility] CVIC [id number]              Hospital Account    Name Acct ID Class Status Primary Coverage   Deion Knight [id number]

## 2024-10-11 NOTE — CARE COORDINATION
10/11 Care Coordination:Pt was a Transfer from Health system where he had presented with complaints of shortness of breath while at dialysis. Patient was found to be hypotensive, EKG suggestive of AVNTR with heart rate of 207.Patient was transferred to [facility] for EP evaluation and is admitted to [facility]. Pt was just discharged on 10/4 from [facility]. He went home with [facility] and is still active. Pt lives alone in a mobile home with 4 steps to enter. No history of ANGELES. [facility] and [facility]. [facility], and clinicals faxed.[phone] to [facility].   [facility]  at 420 PM. Pt states he is independent and has a walker and a shower chair. He uses [employer] for transportation to HD. He went to HD for his 1st session and developed SOB and a ambulance transported him to ED at [facility]. Discussed discharge plan. He states he plans on returning home. CM/SW will continue to follow for discharge planning. Pt now ordered a Life VestKenneth from [facility] called. Also notified Margo at [facility] of admission. Will need to get [facility] orders for [place] updated. [facility].   Lazaro BARRON,RN--BC  [phone]

## 2024-10-11 NOTE — FLOWSHEET NOTE
10/11/24 1442   Vital Signs   /63   Temp 98 °F (36.7 °C)   Pulse 81   Respirations 20   Post-Hemodialysis Assessment   Post-Treatment Procedures Blood returned;Catheter capped, clamped and heparinized x 2 ports   Machine Disinfection Process Acid/Vinegar Clean;Heat Disinfect;Exterior Machine Disinfection   Blood Volume Processed (Liters) 95.4 L   Dialyzer Clearance Moderately streaked   Duration of Treatment (minutes) 240 minutes   Hemodialysis Intake (ml) 300 ml   Hemodialysis Output (ml) 2300 ml   NET Removed (ml) 2000   Tolerated Treatment Good   Patient Response to Treatment tolerated tx well vss no complaints removed 2L without difficulty. hd cath dressing changed. no signs of infection however pt states its tender.   RLE Edema +2;Pitting   LLE Edema +2;Pitting   Time Off 1442   Patient Disposition Remain in ICU/ED   Observations & Evaluations   Level of Consciousness 0   Respiratory Quality/Effort Unlabored   O2 Device Nasal cannula

## 2024-10-11 NOTE — ANESTHESIA POSTPROCEDURE EVALUATION
Department of Anesthesiology  Postprocedure Note    Patient: Deion Knight  MRN: [id number]  YOB: 1950  Date of evaluation: 10/11/2024    Procedure Summary       Date: 10/10/24 Room / Location: [facility] EP LAB 2 / [facility] CARDIAC CATH LAB    Anesthesia Start: 1431 Anesthesia Stop: 1847    Procedures:       Ep study complete      Ablation SVT Diagnosis:       SVT (supraventricular tachycardia) (HCC)      (Atypical AV mio reentry  Atrial tachycardia)    Providers: Soumya Wilson MD Responsible Provider: Ely Peterson MD    Anesthesia Type: MAC ASA Status: 4            Anesthesia Type: No value filed.    Anne Phase I:      Anne Phase II:      Anesthesia Post Evaluation    Patient location during evaluation: PACU  Patient participation: complete - patient participated  Level of consciousness: awake  Pain score: 0  Airway patency: patent  Nausea & Vomiting: no nausea  Cardiovascular status: hemodynamically stable  Respiratory status: acceptable  Hydration status: stable  Multimodal analgesia pain management approach    There were no known notable events for this encounter.

## 2024-10-11 NOTE — ACP (ADVANCE CARE PLANNING)
Advance Care Planning   Healthcare Decision Maker:    Primary Decision Maker: Dawn David - Brother/Sister - [phone]    Click here to complete Healthcare Decision Makers including selection of the Healthcare Decision Maker Relationship (ie \"Primary\").

## 2024-10-11 NOTE — CARE COORDINATION
Case Management Assessment  Initial Evaluation    Date/Time of Evaluation: 10/11/2024 11:13 AM  Assessment Completed by: Lazaro Casper RN    If patient is discharged prior to next notation, then this note serves as note for discharge by case management.    Patient Name: Deion Knight                   YOB: 1950  Diagnosis: SVT (supraventricular tachycardia) (HCC) [I47.10]                   Date / Time: 10/10/2024  7:11 AM    Patient Admission Status: Inpatient   Readmission Risk (Low < 19, Mod (19-27), High > 27): Readmission Risk Score: 35.2    Current PCP: Dean Alanis APRN - CNP  PCP verified by CM? Yes    Chart Reviewed: Yes      History Provided by: Patient  Patient Orientation: Alert and Oriented    Patient Cognition: Alert    Hospitalization in the last 30 days (Readmission):  Yes    If yes, Readmission Assessment in CM Navigator will be completed.    Advance Directives:      Code Status: Full Code   Patient's Primary Decision Maker is: Legal Next of Kin    Primary Decision Maker: Dawn David - Brother/Sister - [phone]    Discharge Planning:    Patient lives with: Alone Type of Home: Trailer/Mobile Home  Primary Care Giver: Self  Patient Support Systems include: Friends/Neighbors, Family Members   Current Financial resources:    Current community resources:    Current services prior to admission: Home Care            Current DME:              Type of Home Care services:  PT, Nursing Services    ADLS  Prior functional level: Independent in ADLs/IADLs, Mobility  Current functional level: Independent in ADLs/IADLs, Mobility    PT AM-PAC:   /24  OT AM-PAC:   /24    Family can provide assistance at DC: No  Would you like Case Management to discuss the discharge plan with any other family members/significant others, and if so, who? Yes  Plans to Return to Present Housing: Yes  Other Identified Issues/Barriers to RETURNING to current housing:     Potential Assistance needed at discharge: Home

## 2024-10-12 PROBLEM — I95.9 HYPOTENSION: Status: ACTIVE | Noted: 2024-10-12

## 2024-10-12 LAB
ANION GAP SERPL CALCULATED.3IONS-SCNC: 12 MMOL/L (ref 7–16)
BUN SERPL-MCNC: 20 MG/DL (ref 6–23)
CALCIUM SERPL-MCNC: 7.6 MG/DL (ref 8.6–10.2)
CHLORIDE SERPL-SCNC: 100 MMOL/L (ref 98–107)
CO2 SERPL-SCNC: 19 MMOL/L (ref 22–29)
CREAT SERPL-MCNC: 3.8 MG/DL (ref 0.7–1.2)
GFR, ESTIMATED: 16 ML/MIN/1.73M2
GLUCOSE BLD-MCNC: 106 MG/DL (ref 74–99)
GLUCOSE BLD-MCNC: 110 MG/DL (ref 74–99)
GLUCOSE BLD-MCNC: 248 MG/DL (ref 74–99)
GLUCOSE BLD-MCNC: 255 MG/DL (ref 74–99)
GLUCOSE SERPL-MCNC: 96 MG/DL (ref 74–99)
POTASSIUM SERPL-SCNC: 4.5 MMOL/L (ref 3.5–5)
SODIUM SERPL-SCNC: 131 MMOL/L (ref 132–146)

## 2024-10-12 PROCEDURE — 99255 IP/OBS CONSLTJ NEW/EST HI 80: CPT | Performed by: INTERNAL MEDICINE

## 2024-10-12 PROCEDURE — 82103 ALPHA-1-ANTITRYPSIN TOTAL: CPT

## 2024-10-12 PROCEDURE — 99232 SBSQ HOSP IP/OBS MODERATE 35: CPT | Performed by: STUDENT IN AN ORGANIZED HEALTH CARE EDUCATION/TRAINING PROGRAM

## 2024-10-12 PROCEDURE — 6360000002 HC RX W HCPCS: Performed by: INTERNAL MEDICINE

## 2024-10-12 PROCEDURE — 80048 BASIC METABOLIC PNL TOTAL CA: CPT

## 2024-10-12 PROCEDURE — 82962 GLUCOSE BLOOD TEST: CPT

## 2024-10-12 PROCEDURE — 6370000000 HC RX 637 (ALT 250 FOR IP): Performed by: INTERNAL MEDICINE

## 2024-10-12 PROCEDURE — 94640 AIRWAY INHALATION TREATMENT: CPT

## 2024-10-12 PROCEDURE — 2580000003 HC RX 258: Performed by: INTERNAL MEDICINE

## 2024-10-12 PROCEDURE — 2140000000 HC CCU INTERMEDIATE R&B

## 2024-10-12 PROCEDURE — 6370000000 HC RX 637 (ALT 250 FOR IP): Performed by: STUDENT IN AN ORGANIZED HEALTH CARE EDUCATION/TRAINING PROGRAM

## 2024-10-12 PROCEDURE — 6370000000 HC RX 637 (ALT 250 FOR IP): Performed by: LICENSED PRACTICAL NURSE

## 2024-10-12 PROCEDURE — 2700000000 HC OXYGEN THERAPY PER DAY

## 2024-10-12 PROCEDURE — 2500000003 HC RX 250 WO HCPCS: Performed by: INTERNAL MEDICINE

## 2024-10-12 PROCEDURE — 82104 ALPHA-1-ANTITRYPSIN PHENO: CPT

## 2024-10-12 PROCEDURE — 36415 COLL VENOUS BLD VENIPUNCTURE: CPT

## 2024-10-12 PROCEDURE — 90935 HEMODIALYSIS ONE EVALUATION: CPT

## 2024-10-12 RX ORDER — IPRATROPIUM BROMIDE AND ALBUTEROL SULFATE 2.5; .5 MG/3ML; MG/3ML
1 SOLUTION RESPIRATORY (INHALATION)
Status: DISCONTINUED | OUTPATIENT
Start: 2024-10-12 | End: 2024-10-17 | Stop reason: HOSPADM

## 2024-10-12 RX ORDER — GUAIFENESIN 400 MG/1
400 TABLET ORAL 4 TIMES DAILY PRN
Status: DISCONTINUED | OUTPATIENT
Start: 2024-10-12 | End: 2024-10-17 | Stop reason: HOSPADM

## 2024-10-12 RX ORDER — PREDNISONE 20 MG/1
40 TABLET ORAL DAILY
Status: COMPLETED | OUTPATIENT
Start: 2024-10-12 | End: 2024-10-16

## 2024-10-12 RX ADMIN — Medication 1 TABLET: at 08:56

## 2024-10-12 RX ADMIN — CLOPIDOGREL BISULFATE 75 MG: 75 TABLET ORAL at 08:56

## 2024-10-12 RX ADMIN — AMIODARONE HYDROCHLORIDE 200 MG: 200 TABLET ORAL at 21:06

## 2024-10-12 RX ADMIN — METOPROLOL SUCCINATE 50 MG: 50 TABLET, EXTENDED RELEASE ORAL at 08:56

## 2024-10-12 RX ADMIN — PETROLATUM: 420 OINTMENT TOPICAL at 21:22

## 2024-10-12 RX ADMIN — IPRATROPIUM BROMIDE AND ALBUTEROL SULFATE 1 DOSE: 2.5; .5 SOLUTION RESPIRATORY (INHALATION) at 14:03

## 2024-10-12 RX ADMIN — INSULIN LISPRO 1 UNITS: 100 INJECTION, SOLUTION INTRAVENOUS; SUBCUTANEOUS at 21:06

## 2024-10-12 RX ADMIN — PREDNISONE 40 MG: 20 TABLET ORAL at 12:19

## 2024-10-12 RX ADMIN — Medication 2000 UNITS: at 08:56

## 2024-10-12 RX ADMIN — IPRATROPIUM BROMIDE AND ALBUTEROL SULFATE 1 DOSE: 2.5; .5 SOLUTION RESPIRATORY (INHALATION) at 16:10

## 2024-10-12 RX ADMIN — HEPARIN SODIUM 5000 UNITS: 5000 INJECTION INTRAVENOUS; SUBCUTANEOUS at 21:06

## 2024-10-12 RX ADMIN — CEFEPIME 1000 MG: 1 INJECTION, POWDER, FOR SOLUTION INTRAMUSCULAR; INTRAVENOUS at 12:23

## 2024-10-12 RX ADMIN — SODIUM CHLORIDE, PRESERVATIVE FREE 10 ML: 5 INJECTION INTRAVENOUS at 21:22

## 2024-10-12 RX ADMIN — ATORVASTATIN CALCIUM 80 MG: 80 TABLET, FILM COATED ORAL at 08:56

## 2024-10-12 RX ADMIN — AMIODARONE HYDROCHLORIDE 200 MG: 200 TABLET ORAL at 12:18

## 2024-10-12 RX ADMIN — PANTOPRAZOLE SODIUM 40 MG: 40 TABLET, DELAYED RELEASE ORAL at 06:03

## 2024-10-12 RX ADMIN — PETROLATUM: 420 OINTMENT TOPICAL at 08:57

## 2024-10-12 RX ADMIN — PANTOPRAZOLE SODIUM 40 MG: 40 TABLET, DELAYED RELEASE ORAL at 08:56

## 2024-10-12 RX ADMIN — HEPARIN SODIUM 5000 UNITS: 5000 INJECTION INTRAVENOUS; SUBCUTANEOUS at 12:19

## 2024-10-12 RX ADMIN — EZETIMIBE 10 MG: 10 TABLET ORAL at 08:57

## 2024-10-12 RX ADMIN — IPRATROPIUM BROMIDE AND ALBUTEROL SULFATE 1 DOSE: 2.5; .5 SOLUTION RESPIRATORY (INHALATION) at 20:13

## 2024-10-12 RX ADMIN — ARFORMOTEROL TARTRATE: 15 SOLUTION RESPIRATORY (INHALATION) at 10:13

## 2024-10-12 RX ADMIN — IPRATROPIUM BROMIDE 0.5 MG: 0.5 SOLUTION RESPIRATORY (INHALATION) at 10:13

## 2024-10-12 RX ADMIN — MIDODRINE HYDROCHLORIDE 5 MG: 5 TABLET ORAL at 08:56

## 2024-10-12 RX ADMIN — ISOSORBIDE MONONITRATE 30 MG: 30 TABLET, EXTENDED RELEASE ORAL at 08:56

## 2024-10-12 RX ADMIN — DOXYCYCLINE 100 MG: 100 INJECTION, POWDER, LYOPHILIZED, FOR SOLUTION INTRAVENOUS at 21:05

## 2024-10-12 RX ADMIN — GUAIFENESIN 400 MG: 400 TABLET ORAL at 04:05

## 2024-10-12 RX ADMIN — SODIUM CHLORIDE, PRESERVATIVE FREE 10 ML: 5 INJECTION INTRAVENOUS at 21:21

## 2024-10-12 RX ADMIN — MIDODRINE HYDROCHLORIDE 5 MG: 5 TABLET ORAL at 12:18

## 2024-10-12 RX ADMIN — HEPARIN SODIUM 5000 UNITS: 5000 INJECTION INTRAVENOUS; SUBCUTANEOUS at 06:03

## 2024-10-12 RX ADMIN — INSULIN LISPRO 2 UNITS: 100 INJECTION, SOLUTION INTRAVENOUS; SUBCUTANEOUS at 12:27

## 2024-10-12 RX ADMIN — ASPIRIN 81 MG: 81 TABLET, COATED ORAL at 08:56

## 2024-10-12 RX ADMIN — DOXYCYCLINE 100 MG: 100 INJECTION, POWDER, LYOPHILIZED, FOR SOLUTION INTRAVENOUS at 08:58

## 2024-10-12 NOTE — FLOWSHEET NOTE
10/12/24 1918   Vital Signs   BP (!) 114/57   Temp 97.9 °F (36.6 °C)   Pulse 63   Respirations 18   Weight - Scale 69 kg (152 lb 1.9 oz)   Weight Method Bed scale   Percent Weight Change 12.19   Post-Hemodialysis Assessment   Machine Disinfection Process Acid/Vinegar Clean;Exterior Machine Disinfection;Heat Disinfect   Rinseback Volume (ml) 300 ml   Dialyzer Clearance Moderately streaked   Duration of Treatment (minutes) 120 minutes   Heparin Amount Administered During Treatment (mL) 0 mL   Hemodialysis Intake (ml) 300 ml   Hemodialysis Output (ml) 2200 ml   NET Removed (ml) 1900   Tolerated Treatment Good   Patient Response to Treatment treatment complete, patienttolerated well, 2 liter fluid removal   Bilateral Breath Sounds Diminished   Physician Notified No   Patient Disposition Return to room   Observations & Evaluations   Level of Consciousness 0   Oriented X 3   Heart Rhythm Regular   Respiratory Quality/Effort Unlabored   O2 Device Nasal cannula

## 2024-10-12 NOTE — CONSULTS
[facility]  Department of Internal Medicine  Division of Pulmonary, Critical Care and Sleep Medicine  Consult Note    Jose Antonio Salinas DO, MPH, [facility], FACHOLLAND, FACP  Marixa Palmer DO, [facility]  Bon Carlos MD, MS Audelia Richard, FREDY-CNP    Patient: Deion Knight  MRN: [id number]  : 1950    Encounter Time: 3:18 PM     Date of Admission: 10/10/2024  7:11 AM    Primary Care Physician: Dean Alanis, FREDY - CNP    Reason for Consultation: Smoker rule out COPD, TAMMIE      HISTORY OF PRESENT ILLNESS : Deion Knight 74 y.o. male was seen in consultation regarding the above chief compliant.    Deion Knight is a 74 y.o. male with a history of SVT-adenosine sensitive, NYHA class III HFrEF-ischemic sp MARTIN x 2 ostial-mid RCA (9/3/2024), and ESRD on HD.  He is managed by Dr. Zee with aspirin 81 mg daily, atorvastatin 80 mg daily, clopidogrel 75 mg daily, ezetimibe 10 mg daily, Imdur 30 mg daily, metoprolol XL 50 mg daily, midodrine 5 mg 3 times daily, and PPI.  In 2024, he was diagnosed with HFrEF-ischemic, which was treated with MARTIN x 2 to the ostial-mid RCA.  TTE at that time reported LVEF of 15-20%.  Later that month, he presented with SVT, which was terminated with adenosine 12 mg IV after failing 6 mg IV, but returned shortly after.  He was evaluated by cardiology, who initiated amiodarone IV and DCCV.  Metoprolol dose was titrated, then he was discharged.  No rhythm strips of SVT were available for my review.  On 10/9/2024, patient presented to [facility] with recurrence of SVT at 186 bpm associated with hypotension (80/60 mmHg).  He was treated with adenosine 6 mg IV x 1 without change, so DCCV restored sinus, but returned to SVT shortly after. Amiodarone was initiated and DCCV again restored sinus.  He was transferred to [facility] for EP evaluation.  EP service is now consulted further evaluation/management of dysrhythmias.  Patient denies 
[facility]- [facility]  Consultation Report  PATIENT: Deion Knight  MEDICAL RECORD NUMBER: [id number]  DATE OF SERVICE:  10/10/2024  ATTENDING ELECTROPHYSIOLOGIST: Jose Antonio Monique DO  PRIMARY ELECTROPHYSIOLOGIST: Jose Antonio Monique DO  REFERRING PHYSICIAN: Jacinta Perales MD and Dean Alanis APRN - CNP  CHIEF COMPLAINT: sent from dialysis for SVT    HPI: This is a 74 y.o. male with a history of ESRD,  SVT, CAD s/p LHC with PCI/overlapping MARTIN x 2 from mid to proximal/ostial RCA 9/3/2024 , ischemic cardiomyopathy with GDMT limited due to ESRD, HTN with recent issues of hypotension, HLD, T2DM, COPD, tobacco abuse, GERD/gastritis, medical non-compliance.  Patient does not follow with a cardiologist. He's managed on ASA 81mg, Lipitor 80mg, Plavix 75mg, Zetia 10mg, Imdur 30mg, Toprol 50mg, Midodrine 5mg TID, PPI, Wellbutrin 300 daily    Patient was most recently seen on 9/30/24 by Dr Main for SVT. He presented to [facility] ER after he was found to be tachycardiac while at hemodialysis, Patient was found to be in SVT versus atrial tachycardia/AFL HR in 190s. He was hypotensive. He was given Adenosine 6mg x 1, followed by 12mg x 1 without conversion. He was then given IV amiodarone bolus 150 x 1 without improvement and continued to be hypotensive. He then underwent successful cardioversion to SR while in ED. Toprol 25 PO daily was resumed and midodrine 5mg po TID for hypotension. Patient was discharged home on a Zio Patch which he is currently wearing. Patient presented to ED on 10-9-24 with concerns for SOB and hypotension. Patient was reportedly at dialysis and staff called EMS.  He was found to be in SVT.  On arrival blood pressure 91/72, heart rate 188, 90% on room air and afebrile.  Labs include sodium 141, potassium 3.6, BUN/creat 19/3.5, calcium 6.4, troponin 1582> 1470, WBC 10.3, Hgb 8.2.  EKG revealing SVT with a ventricular rate of 196 bpm.  He received 
Please see consult dated 10/9/2024. Patient was sent to [facility] for EP.   Electronically signed by DAVID MONTILLA on 10/10/2024 at 8:56 AM    
              BMI Categories: Underweight (BMI less than 22) age over 65    Estimated Daily Nutrient Needs:  Energy Requirements Based On: Formula  Weight Used for Energy Requirements: Current  Energy (kcal/day): 5481-8585  Weight Used for Protein Requirements: Current  Protein (g/day): 1.3-1.5g/kgxCBW=75-85g  Method Used for Fluid Requirements: Standard Renal  Fluid (ml/day): per nephrology management    Nutrition Diagnosis:   Increased nutrient needs related to increase demand for energy/nutrients as evidenced by wounds    Nutrition Interventions:   Food and/or Nutrient Delivery: Continue Current Diet, Start Oral Nutrition Supplement (Nepro BID (monitor K/phos) and junior BID)  Nutrition Education/Counseling: Education not indicated  Coordination of Nutrition Care: Continue to monitor while inpatient       Goals:     Goals: PO intake 75% or greater, by next RD assessment       Nutrition Monitoring and Evaluation:   Behavioral-Environmental Outcomes: None Identified  Food/Nutrient Intake Outcomes: Food and Nutrient Intake, Supplement Intake  Physical Signs/Symptoms Outcomes: Nutrition Focused Physical Findings, Biochemical Data, Chewing or Swallowing, Skin, Weight, GI Status, Fluid Status or Edema, Hemodynamic Status    Discharge Planning:    Continue Oral Nutrition Supplement     Vera Han RD, LD  Contact: [phone]    
cigarettes. He has never used smokeless tobacco. He reports that he does not currently use drugs. He reports that he does not drink alcohol.    Allergies:  Patient has no known allergies.    Current Medications:    sodium chloride flush 0.9 % injection 5-40 mL, 2 times per day  sodium chloride flush 0.9 % injection 5-40 mL, PRN  0.9 % sodium chloride infusion, PRN  enoxaparin Sodium (LOVENOX) injection 30 mg, Daily  ondansetron (ZOFRAN-ODT) disintegrating tablet 4 mg, Q8H PRN   Or  ondansetron (ZOFRAN) injection 4 mg, Q6H PRN  polyethylene glycol (GLYCOLAX) packet 17 g, Daily PRN  acetaminophen (TYLENOL) tablet 650 mg, Q6H PRN   Or  acetaminophen (TYLENOL) suppository 650 mg, Q6H PRN  doxycycline (VIBRAMYCIN) 100 mg in sodium chloride 0.9 % 100 mL IVPB ([id number]), Q12H  glucose chewable tablet 16 g, PRN  dextrose bolus 10% 125 mL, PRN   Or  dextrose bolus 10% 250 mL, PRN  glucagon injection 1 mg, PRN  dextrose 10 % infusion, Continuous PRN  amiodarone (CORDARONE) 450 mg in dextrose 5 % 250 mL infusion, Continuous  aspirin EC tablet 81 mg, Daily  atorvastatin (LIPITOR) tablet 80 mg, Daily  arformoterol 15 mcg-budesonide 0.5 mg neb solution, BID RT  clopidogrel (PLAVIX) tablet 75 mg, Daily  ezetimibe (ZETIA) tablet 10 mg, Daily  midodrine (PROAMATINE) tablet 5 mg, TID WC  vitamin D (CHOLECALCIFEROL) tablet 2,000 Units, Daily  [START ON 10/11/2024] metoprolol succinate (TOPROL XL) extended release tablet 50 mg, Daily  pantoprazole (PROTONIX) tablet 40 mg, Daily  therapeutic multivitamin-minerals 1 tablet, Daily  [START ON 10/11/2024] isosorbide mononitrate (IMDUR) extended release tablet 30 mg, Daily  ipratropium (ATROVENT) 0.02 % nebulizer solution 0.5 mg, BID RT  insulin lispro (HUMALOG,ADMELOG) injection vial 0-4 Units, 4x Daily AC & HS  [START ON 10/11/2024] buPROPion (WELLBUTRIN XL) extended release tablet 150 mg, QAM  cefepime (MAXIPIME) 1,000 mg in sodium chloride 0.9 % 50 mL IVPB ([id number]), Q24H        Review

## 2024-10-12 NOTE — CARE COORDINATION
[facility] Quality Flow/Interdisciplinary Rounds Progress Note        Quality Flow Rounds held on October 12, 2024    Disciplines Attending:  Bedside Nurse and Nursing Unit Leadership    Deion Knight was admitted on 10/10/2024  7:11 AM    Anticipated Discharge Date:       Disposition:    Conrad Score:  Conrad Scale Score: 17    Readmission Risk              Risk of Unplanned Readmission:  49           Discussed patient goal for the day, patient clinical progression, and barriers to discharge.  The following Goal(s) of the Day/Commitment(s) have been identified:  Diagnostics - Report Results and Labs - Report Results      Mony Oconnor RN  October 12, 2024

## 2024-10-13 LAB
ANION GAP SERPL CALCULATED.3IONS-SCNC: 18 MMOL/L (ref 7–16)
BASOPHILS # BLD: 0.01 K/UL (ref 0–0.2)
BASOPHILS NFR BLD: 0 % (ref 0–2)
BUN SERPL-MCNC: 35 MG/DL (ref 6–23)
CALCIUM SERPL-MCNC: 8.4 MG/DL (ref 8.6–10.2)
CHLORIDE SERPL-SCNC: 95 MMOL/L (ref 98–107)
CO2 SERPL-SCNC: 21 MMOL/L (ref 22–29)
CREAT SERPL-MCNC: 4.7 MG/DL (ref 0.7–1.2)
EOSINOPHIL # BLD: 0 K/UL (ref 0.05–0.5)
EOSINOPHILS RELATIVE PERCENT: 0 % (ref 0–6)
ERYTHROCYTE [DISTWIDTH] IN BLOOD BY AUTOMATED COUNT: 16.1 % (ref 11.5–15)
GFR, ESTIMATED: 12 ML/MIN/1.73M2
GLUCOSE BLD-MCNC: 150 MG/DL (ref 74–99)
GLUCOSE BLD-MCNC: 171 MG/DL (ref 74–99)
GLUCOSE BLD-MCNC: 283 MG/DL (ref 74–99)
GLUCOSE BLD-MCNC: 362 MG/DL (ref 74–99)
GLUCOSE SERPL-MCNC: 143 MG/DL (ref 74–99)
HCT VFR BLD AUTO: 29.1 % (ref 37–54)
HGB BLD-MCNC: 8.9 G/DL (ref 12.5–16.5)
IMM GRANULOCYTES # BLD AUTO: 0.05 K/UL (ref 0–0.58)
IMM GRANULOCYTES NFR BLD: 1 % (ref 0–5)
LYMPHOCYTES NFR BLD: 0.67 K/UL (ref 1.5–4)
LYMPHOCYTES RELATIVE PERCENT: 9 % (ref 20–42)
MCH RBC QN AUTO: 28.7 PG (ref 26–35)
MCHC RBC AUTO-ENTMCNC: 30.6 G/DL (ref 32–34.5)
MCV RBC AUTO: 93.9 FL (ref 80–99.9)
MICROORGANISM SPEC CULT: NORMAL
MICROORGANISM SPEC CULT: NORMAL
MONOCYTES NFR BLD: 0.4 K/UL (ref 0.1–0.95)
MONOCYTES NFR BLD: 5 % (ref 2–12)
NEUTROPHILS NFR BLD: 85 % (ref 43–80)
NEUTS SEG NFR BLD: 6.47 K/UL (ref 1.8–7.3)
PHOSPHATE SERPL-MCNC: 4.5 MG/DL (ref 2.5–4.5)
PLATELET # BLD AUTO: 202 K/UL (ref 130–450)
PMV BLD AUTO: 11.4 FL (ref 7–12)
POTASSIUM SERPL-SCNC: 4.3 MMOL/L (ref 3.5–5)
RBC # BLD AUTO: 3.1 M/UL (ref 3.8–5.8)
SERVICE CMNT-IMP: NORMAL
SERVICE CMNT-IMP: NORMAL
SODIUM SERPL-SCNC: 134 MMOL/L (ref 132–146)
SPECIMEN DESCRIPTION: NORMAL
SPECIMEN DESCRIPTION: NORMAL
WBC OTHER # BLD: 7.6 K/UL (ref 4.5–11.5)

## 2024-10-13 PROCEDURE — 85025 COMPLETE CBC W/AUTO DIFF WBC: CPT

## 2024-10-13 PROCEDURE — 6370000000 HC RX 637 (ALT 250 FOR IP): Performed by: INTERNAL MEDICINE

## 2024-10-13 PROCEDURE — 80048 BASIC METABOLIC PNL TOTAL CA: CPT

## 2024-10-13 PROCEDURE — 6360000002 HC RX W HCPCS: Performed by: INTERNAL MEDICINE

## 2024-10-13 PROCEDURE — 2580000003 HC RX 258: Performed by: INTERNAL MEDICINE

## 2024-10-13 PROCEDURE — 2140000000 HC CCU INTERMEDIATE R&B

## 2024-10-13 PROCEDURE — 36415 COLL VENOUS BLD VENIPUNCTURE: CPT

## 2024-10-13 PROCEDURE — 2700000000 HC OXYGEN THERAPY PER DAY

## 2024-10-13 PROCEDURE — 94640 AIRWAY INHALATION TREATMENT: CPT

## 2024-10-13 PROCEDURE — 6370000000 HC RX 637 (ALT 250 FOR IP): Performed by: STUDENT IN AN ORGANIZED HEALTH CARE EDUCATION/TRAINING PROGRAM

## 2024-10-13 PROCEDURE — 2500000003 HC RX 250 WO HCPCS: Performed by: INTERNAL MEDICINE

## 2024-10-13 PROCEDURE — 99232 SBSQ HOSP IP/OBS MODERATE 35: CPT | Performed by: STUDENT IN AN ORGANIZED HEALTH CARE EDUCATION/TRAINING PROGRAM

## 2024-10-13 PROCEDURE — 99233 SBSQ HOSP IP/OBS HIGH 50: CPT | Performed by: INTERNAL MEDICINE

## 2024-10-13 PROCEDURE — 82962 GLUCOSE BLOOD TEST: CPT

## 2024-10-13 PROCEDURE — 84100 ASSAY OF PHOSPHORUS: CPT

## 2024-10-13 RX ORDER — MECLIZINE HCL 12.5 MG 12.5 MG/1
12.5 TABLET ORAL EVERY 6 HOURS SCHEDULED
Status: DISCONTINUED | OUTPATIENT
Start: 2024-10-13 | End: 2024-10-17 | Stop reason: HOSPADM

## 2024-10-13 RX ORDER — LORAZEPAM 2 MG/ML
0.5 INJECTION INTRAMUSCULAR ONCE
Status: COMPLETED | OUTPATIENT
Start: 2024-10-13 | End: 2024-10-14

## 2024-10-13 RX ORDER — INSULIN GLARGINE 100 [IU]/ML
8 INJECTION, SOLUTION SUBCUTANEOUS NIGHTLY
Status: COMPLETED | OUTPATIENT
Start: 2024-10-13 | End: 2024-10-14

## 2024-10-13 RX ADMIN — DOXYCYCLINE 100 MG: 100 INJECTION, POWDER, LYOPHILIZED, FOR SOLUTION INTRAVENOUS at 08:27

## 2024-10-13 RX ADMIN — IPRATROPIUM BROMIDE AND ALBUTEROL SULFATE 1 DOSE: 2.5; .5 SOLUTION RESPIRATORY (INHALATION) at 13:26

## 2024-10-13 RX ADMIN — EZETIMIBE 10 MG: 10 TABLET ORAL at 08:24

## 2024-10-13 RX ADMIN — CEFEPIME 1000 MG: 1 INJECTION, POWDER, FOR SOLUTION INTRAMUSCULAR; INTRAVENOUS at 12:14

## 2024-10-13 RX ADMIN — IPRATROPIUM BROMIDE AND ALBUTEROL SULFATE 1 DOSE: 2.5; .5 SOLUTION RESPIRATORY (INHALATION) at 21:08

## 2024-10-13 RX ADMIN — PETROLATUM: 420 OINTMENT TOPICAL at 21:35

## 2024-10-13 RX ADMIN — ISOSORBIDE MONONITRATE 30 MG: 30 TABLET, EXTENDED RELEASE ORAL at 08:23

## 2024-10-13 RX ADMIN — HEPARIN SODIUM 5000 UNITS: 5000 INJECTION INTRAVENOUS; SUBCUTANEOUS at 21:19

## 2024-10-13 RX ADMIN — DOXYCYCLINE 100 MG: 100 INJECTION, POWDER, LYOPHILIZED, FOR SOLUTION INTRAVENOUS at 21:20

## 2024-10-13 RX ADMIN — PETROLATUM: 420 OINTMENT TOPICAL at 08:52

## 2024-10-13 RX ADMIN — BUPROPION HYDROCHLORIDE 150 MG: 150 TABLET, EXTENDED RELEASE ORAL at 08:24

## 2024-10-13 RX ADMIN — MIDODRINE HYDROCHLORIDE 5 MG: 5 TABLET ORAL at 12:10

## 2024-10-13 RX ADMIN — MIDODRINE HYDROCHLORIDE 5 MG: 5 TABLET ORAL at 08:24

## 2024-10-13 RX ADMIN — ASPIRIN 81 MG: 81 TABLET, COATED ORAL at 08:24

## 2024-10-13 RX ADMIN — SODIUM CHLORIDE, PRESERVATIVE FREE 10 ML: 5 INJECTION INTRAVENOUS at 21:36

## 2024-10-13 RX ADMIN — PREDNISONE 40 MG: 20 TABLET ORAL at 08:23

## 2024-10-13 RX ADMIN — Medication 2000 UNITS: at 08:23

## 2024-10-13 RX ADMIN — CLOPIDOGREL BISULFATE 75 MG: 75 TABLET ORAL at 08:24

## 2024-10-13 RX ADMIN — PANTOPRAZOLE SODIUM 40 MG: 40 TABLET, DELAYED RELEASE ORAL at 06:53

## 2024-10-13 RX ADMIN — MIDODRINE HYDROCHLORIDE 5 MG: 5 TABLET ORAL at 16:48

## 2024-10-13 RX ADMIN — INSULIN GLARGINE 8 UNITS: 100 INJECTION, SOLUTION SUBCUTANEOUS at 21:19

## 2024-10-13 RX ADMIN — MECLIZINE 12.5 MG: 12.5 TABLET ORAL at 12:10

## 2024-10-13 RX ADMIN — AMIODARONE HYDROCHLORIDE 200 MG: 200 TABLET ORAL at 21:19

## 2024-10-13 RX ADMIN — IPRATROPIUM BROMIDE AND ALBUTEROL SULFATE 1 DOSE: 2.5; .5 SOLUTION RESPIRATORY (INHALATION) at 16:38

## 2024-10-13 RX ADMIN — AMIODARONE HYDROCHLORIDE 200 MG: 200 TABLET ORAL at 12:10

## 2024-10-13 RX ADMIN — INSULIN LISPRO 2 UNITS: 100 INJECTION, SOLUTION INTRAVENOUS; SUBCUTANEOUS at 16:48

## 2024-10-13 RX ADMIN — INSULIN LISPRO 4 UNITS: 100 INJECTION, SOLUTION INTRAVENOUS; SUBCUTANEOUS at 21:19

## 2024-10-13 RX ADMIN — HEPARIN SODIUM 5000 UNITS: 5000 INJECTION INTRAVENOUS; SUBCUTANEOUS at 06:53

## 2024-10-13 RX ADMIN — HEPARIN SODIUM 5000 UNITS: 5000 INJECTION INTRAVENOUS; SUBCUTANEOUS at 14:18

## 2024-10-13 RX ADMIN — ATORVASTATIN CALCIUM 80 MG: 80 TABLET, FILM COATED ORAL at 08:23

## 2024-10-13 RX ADMIN — Medication 1 TABLET: at 08:24

## 2024-10-13 RX ADMIN — SODIUM CHLORIDE, PRESERVATIVE FREE 10 ML: 5 INJECTION INTRAVENOUS at 21:35

## 2024-10-13 RX ADMIN — MECLIZINE 12.5 MG: 12.5 TABLET ORAL at 16:48

## 2024-10-13 RX ADMIN — METOPROLOL SUCCINATE 50 MG: 50 TABLET, EXTENDED RELEASE ORAL at 08:24

## 2024-10-13 RX ADMIN — IPRATROPIUM BROMIDE AND ALBUTEROL SULFATE 1 DOSE: 2.5; .5 SOLUTION RESPIRATORY (INHALATION) at 09:51

## 2024-10-13 RX ADMIN — AMIODARONE HYDROCHLORIDE 200 MG: 200 TABLET ORAL at 03:36

## 2024-10-13 ASSESSMENT — PAIN SCALES - GENERAL: PAINLEVEL_OUTOF10: 0

## 2024-10-13 NOTE — CARE COORDINATION
[facility] Quality Flow/Interdisciplinary Rounds Progress Note        Quality Flow Rounds held on October 13, 2024    Disciplines Attending:  Bedside Nurse and Nursing Unit Leadership    Deion Knight was admitted on 10/10/2024  7:11 AM    Anticipated Discharge Date:       Disposition:    Conrad Score:  Conrad Scale Score: 19    Readmission Risk              Risk of Unplanned Readmission:  52           Discussed patient goal for the day, patient clinical progression, and barriers to discharge.  The following Goal(s) of the Day/Commitment(s) have been identified:  Diagnostics - Report Results and Labs - Report Results      Mony Oconnor RN  October 13, 2024

## 2024-10-13 NOTE — PROCEDURES
PROCEDURE NOTE  Date: 10/13/2024   Name: Deion Knight  YOB: 1950    Procedures        MRI screening needed prior to MRI scan.

## 2024-10-14 LAB
ANION GAP SERPL CALCULATED.3IONS-SCNC: 17 MMOL/L (ref 7–16)
BASOPHILS # BLD: 0.01 K/UL (ref 0–0.2)
BASOPHILS NFR BLD: 0 % (ref 0–2)
BUN SERPL-MCNC: 62 MG/DL (ref 6–23)
CALCIUM SERPL-MCNC: 8.3 MG/DL (ref 8.6–10.2)
CHLORIDE SERPL-SCNC: 94 MMOL/L (ref 98–107)
CO2 SERPL-SCNC: 21 MMOL/L (ref 22–29)
CREAT SERPL-MCNC: 5.2 MG/DL (ref 0.7–1.2)
EOSINOPHIL # BLD: 0 K/UL (ref 0.05–0.5)
EOSINOPHILS RELATIVE PERCENT: 0 % (ref 0–6)
ERYTHROCYTE [DISTWIDTH] IN BLOOD BY AUTOMATED COUNT: 15.6 % (ref 11.5–15)
GFR, ESTIMATED: 11 ML/MIN/1.73M2
GLUCOSE BLD-MCNC: 145 MG/DL (ref 74–99)
GLUCOSE BLD-MCNC: 262 MG/DL (ref 74–99)
GLUCOSE BLD-MCNC: 83 MG/DL (ref 74–99)
GLUCOSE SERPL-MCNC: 118 MG/DL (ref 74–99)
HCT VFR BLD AUTO: 31 % (ref 37–54)
HGB BLD-MCNC: 9.2 G/DL (ref 12.5–16.5)
IMM GRANULOCYTES # BLD AUTO: 0.05 K/UL (ref 0–0.58)
IMM GRANULOCYTES NFR BLD: 1 % (ref 0–5)
LYMPHOCYTES NFR BLD: 0.86 K/UL (ref 1.5–4)
LYMPHOCYTES RELATIVE PERCENT: 8 % (ref 20–42)
MCH RBC QN AUTO: 28 PG (ref 26–35)
MCHC RBC AUTO-ENTMCNC: 29.7 G/DL (ref 32–34.5)
MCV RBC AUTO: 94.5 FL (ref 80–99.9)
MICROORGANISM SPEC CULT: NORMAL
MICROORGANISM SPEC CULT: NORMAL
MONOCYTES NFR BLD: 0.65 K/UL (ref 0.1–0.95)
MONOCYTES NFR BLD: 6 % (ref 2–12)
NEUTROPHILS NFR BLD: 85 % (ref 43–80)
NEUTS SEG NFR BLD: 8.8 K/UL (ref 1.8–7.3)
PHOSPHATE SERPL-MCNC: 4.5 MG/DL (ref 2.5–4.5)
PLATELET # BLD AUTO: 249 K/UL (ref 130–450)
PMV BLD AUTO: 11.6 FL (ref 7–12)
POTASSIUM SERPL-SCNC: 4.3 MMOL/L (ref 3.5–5)
RBC # BLD AUTO: 3.28 M/UL (ref 3.8–5.8)
SERVICE CMNT-IMP: NORMAL
SERVICE CMNT-IMP: NORMAL
SODIUM SERPL-SCNC: 132 MMOL/L (ref 132–146)
SPECIMEN DESCRIPTION: NORMAL
SPECIMEN DESCRIPTION: NORMAL
WBC OTHER # BLD: 10.4 K/UL (ref 4.5–11.5)

## 2024-10-14 PROCEDURE — 2700000000 HC OXYGEN THERAPY PER DAY

## 2024-10-14 PROCEDURE — 36415 COLL VENOUS BLD VENIPUNCTURE: CPT

## 2024-10-14 PROCEDURE — 94640 AIRWAY INHALATION TREATMENT: CPT

## 2024-10-14 PROCEDURE — 6360000002 HC RX W HCPCS: Performed by: NURSE PRACTITIONER

## 2024-10-14 PROCEDURE — 80048 BASIC METABOLIC PNL TOTAL CA: CPT

## 2024-10-14 PROCEDURE — 84100 ASSAY OF PHOSPHORUS: CPT

## 2024-10-14 PROCEDURE — 99232 SBSQ HOSP IP/OBS MODERATE 35: CPT | Performed by: INTERNAL MEDICINE

## 2024-10-14 PROCEDURE — 6370000000 HC RX 637 (ALT 250 FOR IP): Performed by: INTERNAL MEDICINE

## 2024-10-14 PROCEDURE — 6370000000 HC RX 637 (ALT 250 FOR IP): Performed by: STUDENT IN AN ORGANIZED HEALTH CARE EDUCATION/TRAINING PROGRAM

## 2024-10-14 PROCEDURE — 99232 SBSQ HOSP IP/OBS MODERATE 35: CPT | Performed by: STUDENT IN AN ORGANIZED HEALTH CARE EDUCATION/TRAINING PROGRAM

## 2024-10-14 PROCEDURE — 90935 HEMODIALYSIS ONE EVALUATION: CPT

## 2024-10-14 PROCEDURE — 85025 COMPLETE CBC W/AUTO DIFF WBC: CPT

## 2024-10-14 PROCEDURE — 2500000003 HC RX 250 WO HCPCS: Performed by: INTERNAL MEDICINE

## 2024-10-14 PROCEDURE — 6360000002 HC RX W HCPCS: Performed by: INTERNAL MEDICINE

## 2024-10-14 PROCEDURE — 6360000002 HC RX W HCPCS: Performed by: STUDENT IN AN ORGANIZED HEALTH CARE EDUCATION/TRAINING PROGRAM

## 2024-10-14 PROCEDURE — 2580000003 HC RX 258: Performed by: INTERNAL MEDICINE

## 2024-10-14 PROCEDURE — 82962 GLUCOSE BLOOD TEST: CPT

## 2024-10-14 PROCEDURE — 2140000000 HC CCU INTERMEDIATE R&B

## 2024-10-14 RX ORDER — BUDESONIDE 0.5 MG/2ML
0.5 INHALANT ORAL
Status: DISCONTINUED | OUTPATIENT
Start: 2024-10-14 | End: 2024-10-17 | Stop reason: HOSPADM

## 2024-10-14 RX ADMIN — PREDNISONE 40 MG: 20 TABLET ORAL at 11:52

## 2024-10-14 RX ADMIN — Medication 1 TABLET: at 11:52

## 2024-10-14 RX ADMIN — MIDODRINE HYDROCHLORIDE 5 MG: 5 TABLET ORAL at 16:48

## 2024-10-14 RX ADMIN — LORAZEPAM 0.5 MG: 2 INJECTION INTRAMUSCULAR; INTRAVENOUS at 12:00

## 2024-10-14 RX ADMIN — IPRATROPIUM BROMIDE AND ALBUTEROL SULFATE 1 DOSE: 2.5; .5 SOLUTION RESPIRATORY (INHALATION) at 20:02

## 2024-10-14 RX ADMIN — EZETIMIBE 10 MG: 10 TABLET ORAL at 11:54

## 2024-10-14 RX ADMIN — BUPROPION HYDROCHLORIDE 150 MG: 150 TABLET, EXTENDED RELEASE ORAL at 11:54

## 2024-10-14 RX ADMIN — DOXYCYCLINE 100 MG: 100 INJECTION, POWDER, LYOPHILIZED, FOR SOLUTION INTRAVENOUS at 12:05

## 2024-10-14 RX ADMIN — PANTOPRAZOLE SODIUM 40 MG: 40 TABLET, DELAYED RELEASE ORAL at 06:36

## 2024-10-14 RX ADMIN — MECLIZINE 12.5 MG: 12.5 TABLET ORAL at 16:48

## 2024-10-14 RX ADMIN — MECLIZINE 12.5 MG: 12.5 TABLET ORAL at 11:59

## 2024-10-14 RX ADMIN — AMIODARONE HYDROCHLORIDE 200 MG: 200 TABLET ORAL at 11:53

## 2024-10-14 RX ADMIN — IPRATROPIUM BROMIDE AND ALBUTEROL SULFATE 1 DOSE: 2.5; .5 SOLUTION RESPIRATORY (INHALATION) at 12:37

## 2024-10-14 RX ADMIN — Medication 2000 UNITS: at 11:51

## 2024-10-14 RX ADMIN — HEPARIN SODIUM 5000 UNITS: 5000 INJECTION INTRAVENOUS; SUBCUTANEOUS at 06:36

## 2024-10-14 RX ADMIN — MECLIZINE 12.5 MG: 12.5 TABLET ORAL at 06:36

## 2024-10-14 RX ADMIN — ASPIRIN 81 MG: 81 TABLET, COATED ORAL at 11:53

## 2024-10-14 RX ADMIN — SODIUM CHLORIDE, PRESERVATIVE FREE 10 ML: 5 INJECTION INTRAVENOUS at 11:54

## 2024-10-14 RX ADMIN — MIDODRINE HYDROCHLORIDE 5 MG: 5 TABLET ORAL at 11:52

## 2024-10-14 RX ADMIN — INSULIN GLARGINE 8 UNITS: 100 INJECTION, SOLUTION SUBCUTANEOUS at 21:53

## 2024-10-14 RX ADMIN — HEPARIN SODIUM 5000 UNITS: 5000 INJECTION INTRAVENOUS; SUBCUTANEOUS at 21:54

## 2024-10-14 RX ADMIN — CLOPIDOGREL BISULFATE 75 MG: 75 TABLET ORAL at 11:52

## 2024-10-14 RX ADMIN — AMIODARONE HYDROCHLORIDE 200 MG: 200 TABLET ORAL at 18:21

## 2024-10-14 RX ADMIN — EPOETIN ALFA-EPBX 3000 UNITS: 3000 INJECTION, SOLUTION INTRAVENOUS; SUBCUTANEOUS at 16:56

## 2024-10-14 RX ADMIN — IPRATROPIUM BROMIDE AND ALBUTEROL SULFATE 1 DOSE: 2.5; .5 SOLUTION RESPIRATORY (INHALATION) at 16:07

## 2024-10-14 RX ADMIN — INSULIN LISPRO 2 UNITS: 100 INJECTION, SOLUTION INTRAVENOUS; SUBCUTANEOUS at 21:53

## 2024-10-14 RX ADMIN — AMIODARONE HYDROCHLORIDE 200 MG: 200 TABLET ORAL at 04:16

## 2024-10-14 RX ADMIN — ATORVASTATIN CALCIUM 80 MG: 80 TABLET, FILM COATED ORAL at 11:53

## 2024-10-14 RX ADMIN — BUDESONIDE 500 MCG: 0.5 SUSPENSION RESPIRATORY (INHALATION) at 20:01

## 2024-10-14 RX ADMIN — PETROLATUM: 420 OINTMENT TOPICAL at 12:19

## 2024-10-14 RX ADMIN — PETROLATUM: 420 OINTMENT TOPICAL at 21:54

## 2024-10-14 RX ADMIN — MIDODRINE HYDROCHLORIDE 5 MG: 5 TABLET ORAL at 06:31

## 2024-10-14 RX ADMIN — HEPARIN SODIUM 5000 UNITS: 5000 INJECTION INTRAVENOUS; SUBCUTANEOUS at 15:47

## 2024-10-14 NOTE — FLOWSHEET NOTE
10/14/24 1109   Vital Signs   BP (!) 121/56   Temp 96.9 °F (36.1 °C)   Pulse 56   Respirations 18   Weight - Scale 68.3 kg (150 lb 9.2 oz)   Percent Weight Change 2.71   Pain Assessment   Pain Assessment None - Denies Pain   Post-Hemodialysis Assessment   Post-Treatment Procedures Blood returned;Catheter capped, clamped and heparinized x 2 ports   Machine Disinfection Process Exterior Machine Disinfection   Rinseback Volume (ml) 300 ml   Blood Volume Processed (Liters) 78.2 L   Dialyzer Clearance Moderately streaked   Duration of Treatment (minutes) 240 minutes   Heparin Amount Administered During Treatment (mL) 0 mL   Hemodialysis Intake (ml) 300 ml   Hemodialysis Output (ml) 1500 ml   NET Removed (ml) 1200   Tolerated Treatment Good   Patient Response to Treatment tolerated minimal fluid removal   Bilateral Breath Sounds Diminished   Edema Right lower extremity;Left lower extremity   RLE Edema +2;Pitting   LLE Edema +2;Pitting   Physician Notified No   Time Off 1104   Patient Disposition Return to room   Observations & Evaluations   Level of Consciousness 0   Oriented X 3

## 2024-10-14 NOTE — CARE COORDINATION
Transition of care update: IV maxipime 1000mg q 24 hrs. IV doxycycline 100mg q 12 hrs. Labs noted. MRI brain ordered. O2 at 5l/nc. Attempted to meet with pt in room. C/o shortness of breath. Pt's nurse aware. PT/OT ordered and will see when appropriate. Prior to admission to [facility], pt was from home and was active with [facility]. Will need resumption of hhc order. ESRD-HD on outpatient HD on M/W/F at [facility]. Pt uses [facility] for transportation to HD. [employer] Life vest in room. Pt stated he does not wear home o2. Cm/sw will follow.

## 2024-10-14 NOTE — PATIENT CARE CONFERENCE
[facility] Quality Flow/Interdisciplinary Rounds Progress Note        Quality Flow Rounds held on October 14, 2024    Disciplines Attending:  Bedside Nurse, , , and Nursing Unit Leadership    Deion Knight was admitted on 10/10/2024  7:11 AM    Anticipated Discharge Date:       Disposition:    Conrad Score:  Conrad Scale Score: 19    Readmission Risk              Risk of Unplanned Readmission:  54           Discussed patient goal for the day, patient clinical progression, and barriers to discharge.  The following Goal(s) of the Day/Commitment(s) have been identified:  Labs - Report Results      Tiffany Hassan RN  October 14, 2024

## 2024-10-15 ENCOUNTER — APPOINTMENT (OUTPATIENT)
Dept: GENERAL RADIOLOGY | Age: 74
DRG: 273 | End: 2024-10-15
Attending: INTERNAL MEDICINE
Payer: OTHER GOVERNMENT

## 2024-10-15 ENCOUNTER — APPOINTMENT (OUTPATIENT)
Dept: MRI IMAGING | Age: 74
DRG: 273 | End: 2024-10-15
Attending: INTERNAL MEDICINE
Payer: OTHER GOVERNMENT

## 2024-10-15 LAB
ANION GAP SERPL CALCULATED.3IONS-SCNC: 13 MMOL/L (ref 7–16)
BASOPHILS # BLD: 0.01 K/UL (ref 0–0.2)
BASOPHILS NFR BLD: 0 % (ref 0–2)
BUN SERPL-MCNC: 39 MG/DL (ref 6–23)
CALCIUM SERPL-MCNC: 8.4 MG/DL (ref 8.6–10.2)
CHLORIDE SERPL-SCNC: 95 MMOL/L (ref 98–107)
CO2 SERPL-SCNC: 22 MMOL/L (ref 22–29)
CREAT SERPL-MCNC: 3.7 MG/DL (ref 0.7–1.2)
EOSINOPHIL # BLD: 0 K/UL (ref 0.05–0.5)
EOSINOPHILS RELATIVE PERCENT: 0 % (ref 0–6)
ERYTHROCYTE [DISTWIDTH] IN BLOOD BY AUTOMATED COUNT: 15.9 % (ref 11.5–15)
GFR, ESTIMATED: 16 ML/MIN/1.73M2
GLUCOSE BLD-MCNC: 147 MG/DL (ref 74–99)
GLUCOSE BLD-MCNC: 155 MG/DL (ref 74–99)
GLUCOSE BLD-MCNC: 294 MG/DL (ref 74–99)
GLUCOSE BLD-MCNC: 305 MG/DL (ref 74–99)
GLUCOSE SERPL-MCNC: 134 MG/DL (ref 74–99)
HCT VFR BLD AUTO: 29.6 % (ref 37–54)
HGB BLD-MCNC: 9.1 G/DL (ref 12.5–16.5)
IMM GRANULOCYTES # BLD AUTO: 0.05 K/UL (ref 0–0.58)
IMM GRANULOCYTES NFR BLD: 1 % (ref 0–5)
LYMPHOCYTES NFR BLD: 0.79 K/UL (ref 1.5–4)
LYMPHOCYTES RELATIVE PERCENT: 9 % (ref 20–42)
MCH RBC QN AUTO: 28.6 PG (ref 26–35)
MCHC RBC AUTO-ENTMCNC: 30.7 G/DL (ref 32–34.5)
MCV RBC AUTO: 93.1 FL (ref 80–99.9)
MONOCYTES NFR BLD: 0.49 K/UL (ref 0.1–0.95)
MONOCYTES NFR BLD: 5 % (ref 2–12)
NEUTROPHILS NFR BLD: 85 % (ref 43–80)
NEUTS SEG NFR BLD: 7.69 K/UL (ref 1.8–7.3)
PHOSPHATE SERPL-MCNC: 3.7 MG/DL (ref 2.5–4.5)
PLATELET # BLD AUTO: 236 K/UL (ref 130–450)
PMV BLD AUTO: 11.5 FL (ref 7–12)
POTASSIUM SERPL-SCNC: 4.4 MMOL/L (ref 3.5–5)
RBC # BLD AUTO: 3.18 M/UL (ref 3.8–5.8)
SODIUM SERPL-SCNC: 130 MMOL/L (ref 132–146)
WBC OTHER # BLD: 9 K/UL (ref 4.5–11.5)

## 2024-10-15 PROCEDURE — 99232 SBSQ HOSP IP/OBS MODERATE 35: CPT | Performed by: INTERNAL MEDICINE

## 2024-10-15 PROCEDURE — 97535 SELF CARE MNGMENT TRAINING: CPT

## 2024-10-15 PROCEDURE — 6370000000 HC RX 637 (ALT 250 FOR IP): Performed by: STUDENT IN AN ORGANIZED HEALTH CARE EDUCATION/TRAINING PROGRAM

## 2024-10-15 PROCEDURE — 71045 X-RAY EXAM CHEST 1 VIEW: CPT

## 2024-10-15 PROCEDURE — 94640 AIRWAY INHALATION TREATMENT: CPT

## 2024-10-15 PROCEDURE — 2580000003 HC RX 258: Performed by: INTERNAL MEDICINE

## 2024-10-15 PROCEDURE — 2700000000 HC OXYGEN THERAPY PER DAY

## 2024-10-15 PROCEDURE — 6360000002 HC RX W HCPCS: Performed by: INTERNAL MEDICINE

## 2024-10-15 PROCEDURE — 70551 MRI BRAIN STEM W/O DYE: CPT

## 2024-10-15 PROCEDURE — 6360000002 HC RX W HCPCS: Performed by: NURSE PRACTITIONER

## 2024-10-15 PROCEDURE — 97161 PT EVAL LOW COMPLEX 20 MIN: CPT

## 2024-10-15 PROCEDURE — 36415 COLL VENOUS BLD VENIPUNCTURE: CPT

## 2024-10-15 PROCEDURE — 6360000002 HC RX W HCPCS: Performed by: STUDENT IN AN ORGANIZED HEALTH CARE EDUCATION/TRAINING PROGRAM

## 2024-10-15 PROCEDURE — 82962 GLUCOSE BLOOD TEST: CPT

## 2024-10-15 PROCEDURE — 2500000003 HC RX 250 WO HCPCS: Performed by: INTERNAL MEDICINE

## 2024-10-15 PROCEDURE — 97165 OT EVAL LOW COMPLEX 30 MIN: CPT

## 2024-10-15 PROCEDURE — 84100 ASSAY OF PHOSPHORUS: CPT

## 2024-10-15 PROCEDURE — 2140000000 HC CCU INTERMEDIATE R&B

## 2024-10-15 PROCEDURE — 97530 THERAPEUTIC ACTIVITIES: CPT

## 2024-10-15 PROCEDURE — 85025 COMPLETE CBC W/AUTO DIFF WBC: CPT

## 2024-10-15 PROCEDURE — 6370000000 HC RX 637 (ALT 250 FOR IP): Performed by: INTERNAL MEDICINE

## 2024-10-15 PROCEDURE — 80048 BASIC METABOLIC PNL TOTAL CA: CPT

## 2024-10-15 PROCEDURE — 99232 SBSQ HOSP IP/OBS MODERATE 35: CPT | Performed by: STUDENT IN AN ORGANIZED HEALTH CARE EDUCATION/TRAINING PROGRAM

## 2024-10-15 RX ORDER — LORAZEPAM 2 MG/ML
0.5 INJECTION INTRAMUSCULAR ONCE
Status: COMPLETED | OUTPATIENT
Start: 2024-10-15 | End: 2024-10-15

## 2024-10-15 RX ORDER — BUPROPION HYDROCHLORIDE 150 MG/1
150 TABLET ORAL EVERY MORNING
Qty: 30 TABLET | Refills: 2 | Status: SHIPPED | OUTPATIENT
Start: 2024-10-15 | End: 2025-01-13

## 2024-10-15 RX ADMIN — MECLIZINE 12.5 MG: 12.5 TABLET ORAL at 02:03

## 2024-10-15 RX ADMIN — PETROLATUM: 420 OINTMENT TOPICAL at 08:39

## 2024-10-15 RX ADMIN — HEPARIN SODIUM 5000 UNITS: 5000 INJECTION INTRAVENOUS; SUBCUTANEOUS at 23:20

## 2024-10-15 RX ADMIN — SODIUM CHLORIDE, PRESERVATIVE FREE 10 ML: 5 INJECTION INTRAVENOUS at 19:52

## 2024-10-15 RX ADMIN — PANTOPRAZOLE SODIUM 40 MG: 40 TABLET, DELAYED RELEASE ORAL at 06:37

## 2024-10-15 RX ADMIN — METOPROLOL SUCCINATE 50 MG: 50 TABLET, EXTENDED RELEASE ORAL at 08:34

## 2024-10-15 RX ADMIN — ATORVASTATIN CALCIUM 80 MG: 80 TABLET, FILM COATED ORAL at 08:34

## 2024-10-15 RX ADMIN — BUPROPION HYDROCHLORIDE 150 MG: 150 TABLET, EXTENDED RELEASE ORAL at 08:34

## 2024-10-15 RX ADMIN — MECLIZINE 12.5 MG: 12.5 TABLET ORAL at 06:37

## 2024-10-15 RX ADMIN — PETROLATUM: 420 OINTMENT TOPICAL at 19:52

## 2024-10-15 RX ADMIN — HEPARIN SODIUM 5000 UNITS: 5000 INJECTION INTRAVENOUS; SUBCUTANEOUS at 06:37

## 2024-10-15 RX ADMIN — AMIODARONE HYDROCHLORIDE 200 MG: 200 TABLET ORAL at 12:24

## 2024-10-15 RX ADMIN — ASPIRIN 81 MG: 81 TABLET, COATED ORAL at 08:34

## 2024-10-15 RX ADMIN — EZETIMIBE 10 MG: 10 TABLET ORAL at 08:34

## 2024-10-15 RX ADMIN — SODIUM CHLORIDE, PRESERVATIVE FREE 10 ML: 5 INJECTION INTRAVENOUS at 08:39

## 2024-10-15 RX ADMIN — PREDNISONE 40 MG: 20 TABLET ORAL at 08:33

## 2024-10-15 RX ADMIN — MIDODRINE HYDROCHLORIDE 5 MG: 5 TABLET ORAL at 08:34

## 2024-10-15 RX ADMIN — AMIODARONE HYDROCHLORIDE 200 MG: 200 TABLET ORAL at 03:47

## 2024-10-15 RX ADMIN — IPRATROPIUM BROMIDE AND ALBUTEROL SULFATE 1 DOSE: 2.5; .5 SOLUTION RESPIRATORY (INHALATION) at 11:15

## 2024-10-15 RX ADMIN — HEPARIN SODIUM 5000 UNITS: 5000 INJECTION INTRAVENOUS; SUBCUTANEOUS at 14:44

## 2024-10-15 RX ADMIN — MIDODRINE HYDROCHLORIDE 5 MG: 5 TABLET ORAL at 16:24

## 2024-10-15 RX ADMIN — Medication 2000 UNITS: at 08:33

## 2024-10-15 RX ADMIN — INSULIN LISPRO 3 UNITS: 100 INJECTION, SOLUTION INTRAVENOUS; SUBCUTANEOUS at 16:24

## 2024-10-15 RX ADMIN — INSULIN LISPRO 2 UNITS: 100 INJECTION, SOLUTION INTRAVENOUS; SUBCUTANEOUS at 19:56

## 2024-10-15 RX ADMIN — DOXYCYCLINE 100 MG: 100 INJECTION, POWDER, LYOPHILIZED, FOR SOLUTION INTRAVENOUS at 02:02

## 2024-10-15 RX ADMIN — MECLIZINE 12.5 MG: 12.5 TABLET ORAL at 23:20

## 2024-10-15 RX ADMIN — Medication 1 TABLET: at 08:33

## 2024-10-15 RX ADMIN — IPRATROPIUM BROMIDE AND ALBUTEROL SULFATE 1 DOSE: 2.5; .5 SOLUTION RESPIRATORY (INHALATION) at 07:51

## 2024-10-15 RX ADMIN — CLOPIDOGREL BISULFATE 75 MG: 75 TABLET ORAL at 08:33

## 2024-10-15 RX ADMIN — MECLIZINE 12.5 MG: 12.5 TABLET ORAL at 16:24

## 2024-10-15 RX ADMIN — BUDESONIDE 500 MCG: 0.5 SUSPENSION RESPIRATORY (INHALATION) at 07:51

## 2024-10-15 RX ADMIN — AMIODARONE HYDROCHLORIDE 200 MG: 200 TABLET ORAL at 19:52

## 2024-10-15 RX ADMIN — MECLIZINE 12.5 MG: 12.5 TABLET ORAL at 12:23

## 2024-10-15 RX ADMIN — MIDODRINE HYDROCHLORIDE 5 MG: 5 TABLET ORAL at 12:23

## 2024-10-15 RX ADMIN — ISOSORBIDE MONONITRATE 30 MG: 30 TABLET, EXTENDED RELEASE ORAL at 08:34

## 2024-10-15 RX ADMIN — LORAZEPAM 0.5 MG: 2 INJECTION INTRAMUSCULAR; INTRAVENOUS at 17:50

## 2024-10-15 ASSESSMENT — PAIN SCALES - GENERAL
PAINLEVEL_OUTOF10: 0
PAINLEVEL_OUTOF10: 0

## 2024-10-15 NOTE — CARE COORDINATION
Transition of care update: MRI brain pending. , cr 3.7 and other labs noted. Has . O2 at 3l/nc. Met with pt in room. We discussed discharge planning. Explained [facility] to pt. Pt is agreeable to do [facility]. Pt is 60% [facility] service connected and is agreeable to use his Medicare coverage for rehab stay. I provided him with micha list to make choices. PT/OT to work with pt. [facility] is following pt. ESRD-HD on outpatient HD on M/W/F at [facility] in [place]. Pt uses [facility] for transportation to HD. [employer] Life vest in room. Cm/sw will follow.     1530  Met with pt in room again. Pt's [facility] choice is [facility]. Left vm referral with Emely with [facility].

## 2024-10-15 NOTE — PATIENT CARE CONFERENCE
P Quality Flow/Interdisciplinary Rounds Progress Note        Quality Flow Rounds held on October 15, 2024    Disciplines Attending:  Bedside Nurse, , , and Nursing Unit Leadership    Deion Knight was admitted on 10/10/2024  7:11 AM    Anticipated Discharge Date:       Disposition:    Conrad Score:  Conrad Scale Score: 19    Readmission Risk              Risk of Unplanned Readmission:  54           Discussed patient goal for the day, patient clinical progression, and barriers to discharge.  The following Goal(s) of the Day/Commitment(s) have been identified:  report labs/diagnostics      Emma Garcia RN  October 15, 2024

## 2024-10-16 LAB
ALPHA-1 ANTITRYPSIN PHENOTYPE: ABNORMAL
ALPHA-1 ANTITRYPSIN: 220 MG/DL (ref 90–200)
ANION GAP SERPL CALCULATED.3IONS-SCNC: 13 MMOL/L (ref 7–16)
BASOPHILS # BLD: 0.01 K/UL (ref 0–0.2)
BASOPHILS NFR BLD: 0 % (ref 0–2)
BUN SERPL-MCNC: 65 MG/DL (ref 6–23)
CALCIUM SERPL-MCNC: 8.6 MG/DL (ref 8.6–10.2)
CHLORIDE SERPL-SCNC: 93 MMOL/L (ref 98–107)
CO2 SERPL-SCNC: 20 MMOL/L (ref 22–29)
CREAT SERPL-MCNC: 4.9 MG/DL (ref 0.7–1.2)
EOSINOPHIL # BLD: 0 K/UL (ref 0.05–0.5)
EOSINOPHILS RELATIVE PERCENT: 0 % (ref 0–6)
ERYTHROCYTE [DISTWIDTH] IN BLOOD BY AUTOMATED COUNT: 15.8 % (ref 11.5–15)
GFR, ESTIMATED: 12 ML/MIN/1.73M2
GLUCOSE BLD-MCNC: 124 MG/DL (ref 74–99)
GLUCOSE BLD-MCNC: 253 MG/DL (ref 74–99)
GLUCOSE BLD-MCNC: 301 MG/DL (ref 74–99)
GLUCOSE BLD-MCNC: 348 MG/DL (ref 74–99)
GLUCOSE SERPL-MCNC: 231 MG/DL (ref 74–99)
HCT VFR BLD AUTO: 30.1 % (ref 37–54)
HGB BLD-MCNC: 9.2 G/DL (ref 12.5–16.5)
IMM GRANULOCYTES # BLD AUTO: 0.07 K/UL (ref 0–0.58)
IMM GRANULOCYTES NFR BLD: 1 % (ref 0–5)
LYMPHOCYTES NFR BLD: 0.93 K/UL (ref 1.5–4)
LYMPHOCYTES RELATIVE PERCENT: 9 % (ref 20–42)
MCH RBC QN AUTO: 28.8 PG (ref 26–35)
MCHC RBC AUTO-ENTMCNC: 30.6 G/DL (ref 32–34.5)
MCV RBC AUTO: 94.1 FL (ref 80–99.9)
MONOCYTES NFR BLD: 0.64 K/UL (ref 0.1–0.95)
MONOCYTES NFR BLD: 6 % (ref 2–12)
NEUTROPHILS NFR BLD: 84 % (ref 43–80)
NEUTS SEG NFR BLD: 8.31 K/UL (ref 1.8–7.3)
PHOSPHATE SERPL-MCNC: 3.9 MG/DL (ref 2.5–4.5)
PLATELET # BLD AUTO: 244 K/UL (ref 130–450)
PMV BLD AUTO: 11.9 FL (ref 7–12)
POTASSIUM SERPL-SCNC: 5.1 MMOL/L (ref 3.5–5)
RBC # BLD AUTO: 3.2 M/UL (ref 3.8–5.8)
SODIUM SERPL-SCNC: 126 MMOL/L (ref 132–146)
WBC OTHER # BLD: 10 K/UL (ref 4.5–11.5)

## 2024-10-16 PROCEDURE — 2580000003 HC RX 258: Performed by: INTERNAL MEDICINE

## 2024-10-16 PROCEDURE — 99232 SBSQ HOSP IP/OBS MODERATE 35: CPT | Performed by: STUDENT IN AN ORGANIZED HEALTH CARE EDUCATION/TRAINING PROGRAM

## 2024-10-16 PROCEDURE — 6360000002 HC RX W HCPCS: Performed by: NURSE PRACTITIONER

## 2024-10-16 PROCEDURE — 85025 COMPLETE CBC W/AUTO DIFF WBC: CPT

## 2024-10-16 PROCEDURE — 90935 HEMODIALYSIS ONE EVALUATION: CPT

## 2024-10-16 PROCEDURE — 80048 BASIC METABOLIC PNL TOTAL CA: CPT

## 2024-10-16 PROCEDURE — 6360000002 HC RX W HCPCS: Performed by: INTERNAL MEDICINE

## 2024-10-16 PROCEDURE — 99232 SBSQ HOSP IP/OBS MODERATE 35: CPT | Performed by: INTERNAL MEDICINE

## 2024-10-16 PROCEDURE — 6370000000 HC RX 637 (ALT 250 FOR IP): Performed by: STUDENT IN AN ORGANIZED HEALTH CARE EDUCATION/TRAINING PROGRAM

## 2024-10-16 PROCEDURE — 36415 COLL VENOUS BLD VENIPUNCTURE: CPT

## 2024-10-16 PROCEDURE — 94640 AIRWAY INHALATION TREATMENT: CPT

## 2024-10-16 PROCEDURE — 6370000000 HC RX 637 (ALT 250 FOR IP): Performed by: INTERNAL MEDICINE

## 2024-10-16 PROCEDURE — 2140000000 HC CCU INTERMEDIATE R&B

## 2024-10-16 PROCEDURE — 84100 ASSAY OF PHOSPHORUS: CPT

## 2024-10-16 PROCEDURE — 82962 GLUCOSE BLOOD TEST: CPT

## 2024-10-16 PROCEDURE — 2700000000 HC OXYGEN THERAPY PER DAY

## 2024-10-16 RX ORDER — ALPRAZOLAM 0.25 MG
0.25 TABLET ORAL 3 TIMES DAILY PRN
Qty: 30 TABLET | Refills: 0 | Status: SHIPPED | OUTPATIENT
Start: 2024-10-16 | End: 2024-11-15

## 2024-10-16 RX ORDER — ALPRAZOLAM 0.25 MG
0.25 TABLET ORAL 3 TIMES DAILY PRN
Status: DISCONTINUED | OUTPATIENT
Start: 2024-10-16 | End: 2024-10-17 | Stop reason: HOSPADM

## 2024-10-16 RX ADMIN — Medication 1 TABLET: at 11:32

## 2024-10-16 RX ADMIN — PETROLATUM: 420 OINTMENT TOPICAL at 23:26

## 2024-10-16 RX ADMIN — HEPARIN SODIUM 5000 UNITS: 5000 INJECTION INTRAVENOUS; SUBCUTANEOUS at 23:26

## 2024-10-16 RX ADMIN — HEPARIN SODIUM 5000 UNITS: 5000 INJECTION INTRAVENOUS; SUBCUTANEOUS at 05:54

## 2024-10-16 RX ADMIN — MIDODRINE HYDROCHLORIDE 5 MG: 5 TABLET ORAL at 16:54

## 2024-10-16 RX ADMIN — HEPARIN SODIUM 5000 UNITS: 5000 INJECTION INTRAVENOUS; SUBCUTANEOUS at 13:27

## 2024-10-16 RX ADMIN — EZETIMIBE 10 MG: 10 TABLET ORAL at 11:33

## 2024-10-16 RX ADMIN — MECLIZINE 12.5 MG: 12.5 TABLET ORAL at 16:56

## 2024-10-16 RX ADMIN — PANTOPRAZOLE SODIUM 40 MG: 40 TABLET, DELAYED RELEASE ORAL at 11:31

## 2024-10-16 RX ADMIN — CLOPIDOGREL BISULFATE 75 MG: 75 TABLET ORAL at 11:32

## 2024-10-16 RX ADMIN — MIDODRINE HYDROCHLORIDE 5 MG: 5 TABLET ORAL at 11:31

## 2024-10-16 RX ADMIN — EPOETIN ALFA-EPBX 3000 UNITS: 3000 INJECTION, SOLUTION INTRAVENOUS; SUBCUTANEOUS at 16:53

## 2024-10-16 RX ADMIN — AMIODARONE HYDROCHLORIDE 200 MG: 200 TABLET ORAL at 02:41

## 2024-10-16 RX ADMIN — AMIODARONE HYDROCHLORIDE 200 MG: 200 TABLET ORAL at 18:26

## 2024-10-16 RX ADMIN — AMIODARONE HYDROCHLORIDE 200 MG: 200 TABLET ORAL at 11:32

## 2024-10-16 RX ADMIN — PANTOPRAZOLE SODIUM 40 MG: 40 TABLET, DELAYED RELEASE ORAL at 05:55

## 2024-10-16 RX ADMIN — INSULIN LISPRO 3 UNITS: 100 INJECTION, SOLUTION INTRAVENOUS; SUBCUTANEOUS at 23:26

## 2024-10-16 RX ADMIN — ATORVASTATIN CALCIUM 80 MG: 80 TABLET, FILM COATED ORAL at 11:32

## 2024-10-16 RX ADMIN — METOPROLOL SUCCINATE 50 MG: 50 TABLET, EXTENDED RELEASE ORAL at 11:32

## 2024-10-16 RX ADMIN — BUPROPION HYDROCHLORIDE 150 MG: 150 TABLET, EXTENDED RELEASE ORAL at 11:33

## 2024-10-16 RX ADMIN — INSULIN LISPRO 2 UNITS: 100 INJECTION, SOLUTION INTRAVENOUS; SUBCUTANEOUS at 16:52

## 2024-10-16 RX ADMIN — PREDNISONE 40 MG: 20 TABLET ORAL at 11:30

## 2024-10-16 RX ADMIN — ISOSORBIDE MONONITRATE 30 MG: 30 TABLET, EXTENDED RELEASE ORAL at 11:31

## 2024-10-16 RX ADMIN — SODIUM CHLORIDE, PRESERVATIVE FREE 10 ML: 5 INJECTION INTRAVENOUS at 23:27

## 2024-10-16 RX ADMIN — Medication 2000 UNITS: at 11:32

## 2024-10-16 RX ADMIN — MECLIZINE 12.5 MG: 12.5 TABLET ORAL at 23:26

## 2024-10-16 RX ADMIN — ASPIRIN 81 MG: 81 TABLET, COATED ORAL at 11:30

## 2024-10-16 RX ADMIN — IPRATROPIUM BROMIDE AND ALBUTEROL SULFATE 1 DOSE: 2.5; .5 SOLUTION RESPIRATORY (INHALATION) at 16:44

## 2024-10-16 RX ADMIN — MECLIZINE 12.5 MG: 12.5 TABLET ORAL at 05:54

## 2024-10-16 ASSESSMENT — PAIN SCALES - GENERAL
PAINLEVEL_OUTOF10: 0
PAINLEVEL_OUTOF10: 0

## 2024-10-16 NOTE — CARE COORDINATION
Transition of care update: , K 5.1 and other labs and mri brain results noted. Plan is [facility].  Sent message to Emely with [facility] to check on acceptance.     0939  Spoke with Hilary at [facility] ph#[phone]  to confirm pt 's chair time. Per Hilary, pt has   MWF schedule with 0535 am chair time.       0948  Per Emely, their director of nursing is going to look at pt.       1405  Pt accepted at [facility]. They are aware pt will have a life vest. [facility] has arranged transportation for pt to HD treatments on MWF at [facility]. They will cover the cost of ambulette transportation to their facility from hospital per Administration at [facility]. Met with pt in room and updated him on above. BP 83/60. Pt asked to be retrained on life vest. C/o having tremors when picking up objects with his hands. Pt's nurse was updated. Ambulette form completed.       1410  Notified Kenneth with [employer] pt requested to be retrained on life vest.     1435  PASRR completed. Transport envelope was placed with pt's soft chart.

## 2024-10-16 NOTE — FLOWSHEET NOTE
10/16/24 1055   Vital Signs   /62   Temp 96.9 °F (36.1 °C)   Pulse 53   Respirations 18   Weight - Scale 69.9 kg (154 lb 1.6 oz)   Weight Method Bed scale   Percent Weight Change 1.3   Pain Assessment   Pain Assessment None - Denies Pain   Post-Hemodialysis Assessment   Post-Treatment Procedures Blood returned;Catheter capped, clamped and heparinized x 2 ports   Machine Disinfection Process Exterior Machine Disinfection;Heat Disinfect;Acid/Vinegar Clean   Rinseback Volume (ml) 300 ml   Blood Volume Processed (Liters) 90.6 L   Dialyzer Clearance Moderately streaked   Duration of Treatment (minutes) 240 minutes   Heparin Amount Administered During Treatment (mL) 0 mL   Hemodialysis Intake (ml) 300 ml   Hemodialysis Output (ml) 1500 ml   NET Removed (ml) 1200   Tolerated Treatment Good   Patient Response to Treatment tolerated minimal fluid removal   Bilateral Breath Sounds Diminished   RLE Edema +2;Pitting   LLE Edema +2;Pitting   Physician Notified No   Time Off 1051   Patient Disposition Return to room   Observations & Evaluations   Level of Consciousness 0   Oriented X 3

## 2024-10-16 NOTE — PATIENT CARE CONFERENCE
P Quality Flow/Interdisciplinary Rounds Progress Note        Quality Flow Rounds held on October 16, 2024    Disciplines Attending:  Bedside Nurse, , , and Nursing Unit Leadership    Deion Knight was admitted on 10/10/2024  7:11 AM    Anticipated Discharge Date:       Disposition:    Conrad Score:  Conrad Scale Score: 19    Readmission Risk              Risk of Unplanned Readmission:  54           Discussed patient goal for the day, patient clinical progression, and barriers to discharge.  The following Goal(s) of the Day/Commitment(s) have been identified:  downgrade/discharge planning       Emma Garcia RN  October 16, 2024

## 2024-10-17 VITALS
WEIGHT: 151.7 LBS | DIASTOLIC BLOOD PRESSURE: 61 MMHG | SYSTOLIC BLOOD PRESSURE: 98 MMHG | OXYGEN SATURATION: 98 % | RESPIRATION RATE: 18 BRPM | BODY MASS INDEX: 24.38 KG/M2 | HEIGHT: 66 IN | HEART RATE: 65 BPM | TEMPERATURE: 97.7 F

## 2024-10-17 LAB
ANION GAP SERPL CALCULATED.3IONS-SCNC: 12 MMOL/L (ref 7–16)
BASOPHILS # BLD: 0 K/UL (ref 0–0.2)
BASOPHILS NFR BLD: 0 % (ref 0–2)
BUN SERPL-MCNC: 41 MG/DL (ref 6–23)
CALCIUM SERPL-MCNC: 8.5 MG/DL (ref 8.6–10.2)
CHLORIDE SERPL-SCNC: 96 MMOL/L (ref 98–107)
CO2 SERPL-SCNC: 24 MMOL/L (ref 22–29)
CREAT SERPL-MCNC: 3.5 MG/DL (ref 0.7–1.2)
EOSINOPHIL # BLD: 0 K/UL (ref 0.05–0.5)
EOSINOPHILS RELATIVE PERCENT: 0 % (ref 0–6)
ERYTHROCYTE [DISTWIDTH] IN BLOOD BY AUTOMATED COUNT: 15.9 % (ref 11.5–15)
GFR, ESTIMATED: 18 ML/MIN/1.73M2
GLUCOSE BLD-MCNC: 191 MG/DL (ref 74–99)
GLUCOSE BLD-MCNC: 201 MG/DL (ref 74–99)
GLUCOSE SERPL-MCNC: 185 MG/DL (ref 74–99)
HCT VFR BLD AUTO: 28.6 % (ref 37–54)
HGB BLD-MCNC: 8.8 G/DL (ref 12.5–16.5)
LYMPHOCYTES NFR BLD: 0.63 K/UL (ref 1.5–4)
LYMPHOCYTES RELATIVE PERCENT: 7 % (ref 20–42)
MCH RBC QN AUTO: 28.9 PG (ref 26–35)
MCHC RBC AUTO-ENTMCNC: 30.8 G/DL (ref 32–34.5)
MCV RBC AUTO: 93.8 FL (ref 80–99.9)
MONOCYTES NFR BLD: 0.39 K/UL (ref 0.1–0.95)
MONOCYTES NFR BLD: 4 % (ref 2–12)
NEUTROPHILS NFR BLD: 89 % (ref 43–80)
NEUTS SEG NFR BLD: 7.98 K/UL (ref 1.8–7.3)
PHOSPHATE SERPL-MCNC: 2.8 MG/DL (ref 2.5–4.5)
PLATELET # BLD AUTO: 229 K/UL (ref 130–450)
PMV BLD AUTO: 11.7 FL (ref 7–12)
POTASSIUM SERPL-SCNC: 4.8 MMOL/L (ref 3.5–5)
RBC # BLD AUTO: 3.05 M/UL (ref 3.8–5.8)
RBC # BLD: ABNORMAL 10*6/UL
SODIUM SERPL-SCNC: 132 MMOL/L (ref 132–146)
WBC OTHER # BLD: 9 K/UL (ref 4.5–11.5)

## 2024-10-17 PROCEDURE — 80048 BASIC METABOLIC PNL TOTAL CA: CPT

## 2024-10-17 PROCEDURE — 6370000000 HC RX 637 (ALT 250 FOR IP): Performed by: STUDENT IN AN ORGANIZED HEALTH CARE EDUCATION/TRAINING PROGRAM

## 2024-10-17 PROCEDURE — 2580000003 HC RX 258: Performed by: INTERNAL MEDICINE

## 2024-10-17 PROCEDURE — 94640 AIRWAY INHALATION TREATMENT: CPT

## 2024-10-17 PROCEDURE — 82962 GLUCOSE BLOOD TEST: CPT

## 2024-10-17 PROCEDURE — 85025 COMPLETE CBC W/AUTO DIFF WBC: CPT

## 2024-10-17 PROCEDURE — 2700000000 HC OXYGEN THERAPY PER DAY

## 2024-10-17 PROCEDURE — 6360000002 HC RX W HCPCS: Performed by: NURSE PRACTITIONER

## 2024-10-17 PROCEDURE — 84100 ASSAY OF PHOSPHORUS: CPT

## 2024-10-17 PROCEDURE — 36415 COLL VENOUS BLD VENIPUNCTURE: CPT

## 2024-10-17 PROCEDURE — 6360000002 HC RX W HCPCS: Performed by: INTERNAL MEDICINE

## 2024-10-17 PROCEDURE — 6370000000 HC RX 637 (ALT 250 FOR IP): Performed by: INTERNAL MEDICINE

## 2024-10-17 RX ADMIN — BUPROPION HYDROCHLORIDE 150 MG: 150 TABLET, EXTENDED RELEASE ORAL at 09:52

## 2024-10-17 RX ADMIN — EZETIMIBE 10 MG: 10 TABLET ORAL at 09:52

## 2024-10-17 RX ADMIN — Medication 2000 UNITS: at 09:52

## 2024-10-17 RX ADMIN — METOPROLOL SUCCINATE 50 MG: 50 TABLET, EXTENDED RELEASE ORAL at 09:52

## 2024-10-17 RX ADMIN — Medication 1 TABLET: at 09:52

## 2024-10-17 RX ADMIN — IPRATROPIUM BROMIDE AND ALBUTEROL SULFATE 1 DOSE: 2.5; .5 SOLUTION RESPIRATORY (INHALATION) at 12:06

## 2024-10-17 RX ADMIN — MIDODRINE HYDROCHLORIDE 5 MG: 5 TABLET ORAL at 12:04

## 2024-10-17 RX ADMIN — MECLIZINE 12.5 MG: 12.5 TABLET ORAL at 05:38

## 2024-10-17 RX ADMIN — BUDESONIDE 500 MCG: 0.5 SUSPENSION RESPIRATORY (INHALATION) at 08:25

## 2024-10-17 RX ADMIN — PETROLATUM: 420 OINTMENT TOPICAL at 09:56

## 2024-10-17 RX ADMIN — SODIUM CHLORIDE, PRESERVATIVE FREE 10 ML: 5 INJECTION INTRAVENOUS at 09:57

## 2024-10-17 RX ADMIN — MIDODRINE HYDROCHLORIDE 5 MG: 5 TABLET ORAL at 09:52

## 2024-10-17 RX ADMIN — PANTOPRAZOLE SODIUM 40 MG: 40 TABLET, DELAYED RELEASE ORAL at 05:38

## 2024-10-17 RX ADMIN — IPRATROPIUM BROMIDE AND ALBUTEROL SULFATE 1 DOSE: 2.5; .5 SOLUTION RESPIRATORY (INHALATION) at 08:24

## 2024-10-17 RX ADMIN — HEPARIN SODIUM 5000 UNITS: 5000 INJECTION INTRAVENOUS; SUBCUTANEOUS at 15:16

## 2024-10-17 RX ADMIN — HEPARIN SODIUM 5000 UNITS: 5000 INJECTION INTRAVENOUS; SUBCUTANEOUS at 05:38

## 2024-10-17 RX ADMIN — MECLIZINE 12.5 MG: 12.5 TABLET ORAL at 12:04

## 2024-10-17 RX ADMIN — ASPIRIN 81 MG: 81 TABLET, COATED ORAL at 09:52

## 2024-10-17 RX ADMIN — CLOPIDOGREL BISULFATE 75 MG: 75 TABLET ORAL at 09:52

## 2024-10-17 RX ADMIN — AMIODARONE HYDROCHLORIDE 200 MG: 200 TABLET ORAL at 03:30

## 2024-10-17 RX ADMIN — ATORVASTATIN CALCIUM 80 MG: 80 TABLET, FILM COATED ORAL at 09:52

## 2024-10-17 RX ADMIN — INSULIN LISPRO 1 UNITS: 100 INJECTION, SOLUTION INTRAVENOUS; SUBCUTANEOUS at 09:52

## 2024-10-17 RX ADMIN — INSULIN LISPRO 1 UNITS: 100 INJECTION, SOLUTION INTRAVENOUS; SUBCUTANEOUS at 12:04

## 2024-10-17 RX ADMIN — ISOSORBIDE MONONITRATE 30 MG: 30 TABLET, EXTENDED RELEASE ORAL at 09:52

## 2024-10-17 RX ADMIN — IPRATROPIUM BROMIDE AND ALBUTEROL SULFATE 1 DOSE: 2.5; .5 SOLUTION RESPIRATORY (INHALATION) at 16:40

## 2024-10-17 RX ADMIN — AMIODARONE HYDROCHLORIDE 200 MG: 200 TABLET ORAL at 12:04

## 2024-10-17 NOTE — CARE COORDINATION
Transition of care update: Discharge order on chart. . /63. Met with pt in room. Will need retrained on life vest. Kenneth with [facility] aware. Plan is [facility]). Their facility van is unavailable today. [facility] will cover the cost of ambulette with [facility] per their . Updated La with [facility] on pt. Notified Yomaira and Johanny with [facility]#[phone] pt will be at HD treatment. Johanny asked for documentation regarding the life vest. Faxed EP cardiology's note to fax#[phone] as requested.   PASRR and transport envelope is with pt's soft chart.     0932  Spoke with Natividad with [facility] and pt is setup for   Natividad is aware pt is on 4l/nc and has a life vest.     1000  Per Natividad she checked with their SOC and pt has to go by ALS due to life vest. Natividad is aware pt DOES NOT have ambulance criteria. [facility] will pick pt up at 3pm.  Left vm with Emely with [facility] to update her on above and  time.       1020  Met with pt in room and informed him of  time.

## 2024-10-17 NOTE — DISCHARGE SUMMARY
[facility] Hospitalist Discharge Summary         Deion Knight  MRN: [id number]  Account Number: [id number]  Admitted: 10/10/2024  Discharge Date: 10/17/24    PCP Handoff    Recommended Outpatient Testing  None    Results Pending At Discharge  None        Clinical Summary    Patient admitted on 10/10/2024      Deion Knight is a 74 y.o. male patient of Dean Alanis APRN - CNP with history of ESRD, CAD s/p PCI, ischemic cardiomyopathy, hypertension presented to [facility] on 10/10/2024 from [facility] ED where he had presented with complaints of shortness of breath while at dialysis.  Patient was found to be hypotensive, EKG suggestive of AVNRT with heart rate of 207.  Patient was given IV fluid, adenosine followed by amiodarone bolus without successful response and was cardioverted.  Patient unfortunately went back to AVNRT after cardioversion and amiodarone drip was started.  Patient was transferred to [facility] for EP evaluation and is admitted to ICU.  Patient underwent successful RF ablation of atrioventricular mio reentry tachycardia.  Patient remains on oral amiodarone in view of LV dysfunction and persistent atrial tachycardia per EP.  Patient was treated for pneumonia with cefepime and doxycycline.  Patient will have outpatient PFTs per pulmonology for COPD.  MRI brain reviewed showing no acute stroke.  Patient was treated for COPD and continues to have subjective shortness of breath.  Patient is saturating well on 3 to 4 L and will be discharged on 4 L nasal cannula with advised to follow-up with pulmonology as outpatient.     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

## 2024-10-17 NOTE — PATIENT CARE CONFERENCE
P Quality Flow/Interdisciplinary Rounds Progress Note        Quality Flow Rounds held on October 17, 2024    Disciplines Attending:  Bedside Nurse, , , and Nursing Unit Leadership    Deion Knight was admitted on 10/10/2024  7:11 AM    Anticipated Discharge Date:       Disposition:    Conrad Score:  Conrad Scale Score: 19    Readmission Risk              Risk of Unplanned Readmission:  49           Discussed patient goal for the day, patient clinical progression, and barriers to discharge.  The following Goal(s) of the Day/Commitment(s) have been identified:  downgrade/discharge planning       Emma Garcia RN  October 17, 2024

## 2024-10-17 NOTE — DISCHARGE INSTR - COC
[facility] Call Center    Phone Message    May a detailed message be left on voicemail: yes     Reason for Call: Other: Pt would like a call back to schedule new pt appt in [place] for Pulmonary      Action Taken: Other: Cardio    Travel Screening: Not Applicable     Date of Service:                                                                      Good    Condition at Discharge: Stable    Rehab Potential (if transferring to Rehab): Good    Recommended Labs or Other Treatments After Discharge: As noted    Physician Certification: I certify the above information and transfer of Deion Knight  is necessary for the continuing treatment of the diagnosis listed and that he requires Skilled Nursing Facility for less 30 days.     Update Admission H&P: No change in H&P    PHYSICIAN SIGNATURE:  Electronically signed by Juan Moss MD on 10/17/24 at 2:04 PM EDT

## 2024-10-17 NOTE — PLAN OF CARE
Problem: Discharge Planning  Goal: Discharge to home or other facility with appropriate resources  10/12/2024 1013 by Aretha Medrano RN  Outcome: Progressing  10/12/2024 0434 by Pilar Ray RN  Outcome: Progressing     Problem: Pain  Goal: Verbalizes/displays adequate comfort level or baseline comfort level  10/12/2024 1013 by Aretha Medrano RN  Outcome: Progressing  10/12/2024 0434 by Pilar Ray RN  Outcome: Progressing  Flowsheets (Taken 10/11/2024 1956 by Cheryl Daniels, RN)  Verbalizes/displays adequate comfort level or baseline comfort level:   Encourage patient to monitor pain and request assistance   Assess pain using appropriate pain scale   Administer analgesics based on type and severity of pain and evaluate response   Implement non-pharmacological measures as appropriate and evaluate response   Consider cultural and social influences on pain and pain management   Notify Licensed Independent Practitioner if interventions unsuccessful or patient reports new pain     Problem: Skin/Tissue Integrity  Goal: Absence of new skin breakdown  Description: 1.  Monitor for areas of redness and/or skin breakdown  2.  Assess vascular access sites hourly  3.  Every 4-6 hours minimum:  Change oxygen saturation probe site  4.  Every 4-6 hours:  If on nasal continuous positive airway pressure, respiratory therapy assess nares and determine need for appliance change or resting period.  10/12/2024 1013 by Aretha Medrano RN  Outcome: Progressing  10/12/2024 0434 by Pilar Ray RN  Outcome: Progressing     Problem: Safety - Adult  Goal: Free from fall injury  10/12/2024 1013 by Aretha Medrano RN  Outcome: Progressing  10/12/2024 0434 by Pilar Ray RN  Outcome: Progressing  Flowsheets (Taken 10/11/2024 1956 by Cheryl Daniels, RN)  Free From Fall Injury:   Instruct family/caregiver on patient safety   Based on caregiver fall risk screen, instruct family/caregiver to ask for assistance 
  Problem: Discharge Planning  Goal: Discharge to home or other facility with appropriate resources  10/13/2024 0944 by Aretha Medrano RN  Outcome: Progressing  10/13/2024 0228 by Isabella Colmenares RN  Outcome: Progressing     Problem: Pain  Goal: Verbalizes/displays adequate comfort level or baseline comfort level  10/13/2024 0944 by Aretha Medrano RN  Outcome: Progressing  10/13/2024 0228 by Isabella Colmenares RN  Outcome: Progressing     Problem: Skin/Tissue Integrity  Goal: Absence of new skin breakdown  Description: 1.  Monitor for areas of redness and/or skin breakdown  2.  Assess vascular access sites hourly  3.  Every 4-6 hours minimum:  Change oxygen saturation probe site  4.  Every 4-6 hours:  If on nasal continuous positive airway pressure, respiratory therapy assess nares and determine need for appliance change or resting period.  10/13/2024 0944 by Aretha Medrano RN  Outcome: Progressing  10/13/2024 0228 by Isabella Colmenares RN  Outcome: Progressing     Problem: Safety - Adult  Goal: Free from fall injury  10/13/2024 0228 by Isabella Colmenares RN  Outcome: Progressing     Problem: ABCDS Injury Assessment  Goal: Absence of physical injury  10/13/2024 0944 by Aretha Medrano RN  Outcome: Progressing  10/13/2024 0228 by Isabella Colmenares RN  Outcome: Progressing     Problem: Chronic Conditions and Co-morbidities  Goal: Patient's chronic conditions and co-morbidity symptoms are monitored and maintained or improved  10/13/2024 0944 by Aretha Medrano RN  Outcome: Progressing  10/13/2024 0228 by Isabella Colmenares RN  Outcome: Progressing     Problem: Cardiovascular - Adult  Goal: Maintains optimal cardiac output and hemodynamic stability  10/13/2024 0944 by Aretha Medrano RN  Outcome: Progressing  10/13/2024 0228 by Isabella Colmenares RN  Outcome: Progressing  Goal: Absence of cardiac dysrhythmias or at baseline  10/13/2024 0944 by Aretha Medrano RN  Outcome: Progressing  10/13/2024 0228 by 
  Problem: Discharge Planning  Goal: Discharge to home or other facility with appropriate resources  10/13/2024 2242 by Isabella Colmenares RN  Outcome: Progressing  10/13/2024 0944 by Aretha Medrano RN  Outcome: Progressing     Problem: Pain  Goal: Verbalizes/displays adequate comfort level or baseline comfort level  10/13/2024 2242 by Isabella Colmenares RN  Outcome: Progressing  10/13/2024 0944 by Aretha Medrano RN  Outcome: Progressing     Problem: Skin/Tissue Integrity  Goal: Absence of new skin breakdown  Description: 1.  Monitor for areas of redness and/or skin breakdown  2.  Assess vascular access sites hourly  3.  Every 4-6 hours minimum:  Change oxygen saturation probe site  4.  Every 4-6 hours:  If on nasal continuous positive airway pressure, respiratory therapy assess nares and determine need for appliance change or resting period.  10/13/2024 2242 by Isabella Colmenares RN  Outcome: Progressing  10/13/2024 0944 by Aretha Medrano RN  Outcome: Progressing     Problem: Safety - Adult  Goal: Free from fall injury  Outcome: Progressing     Problem: ABCDS Injury Assessment  Goal: Absence of physical injury  10/13/2024 2242 by Isabella Colmenares RN  Outcome: Progressing  10/13/2024 0944 by Aretha Medrano RN  Outcome: Progressing     Problem: Chronic Conditions and Co-morbidities  Goal: Patient's chronic conditions and co-morbidity symptoms are monitored and maintained or improved  10/13/2024 2242 by Isabella Colmenares RN  Outcome: Progressing  10/13/2024 0944 by Aretha Medrano RN  Outcome: Progressing     Problem: Cardiovascular - Adult  Goal: Maintains optimal cardiac output and hemodynamic stability  10/13/2024 2242 by Isabella Colmenares RN  Outcome: Progressing  10/13/2024 0944 by Aretha Medrano RN  Outcome: Progressing  Goal: Absence of cardiac dysrhythmias or at baseline  10/13/2024 2242 by Isabella Colmenares RN  Outcome: Progressing  10/13/2024 0944 by Aretha Medrano RN  Outcome: 
  Problem: Discharge Planning  Goal: Discharge to home or other facility with appropriate resources  10/15/2024 1403 by Hilary Sanchez RN  Outcome: Progressing  Flowsheets (Taken 10/15/2024 0800)  Discharge to home or other facility with appropriate resources: Identify barriers to discharge with patient and caregiver  10/15/2024 0355 by Ariadna Mane RN  Outcome: Progressing     Problem: Pain  Goal: Verbalizes/displays adequate comfort level or baseline comfort level  10/15/2024 1403 by Hilary Sanchez, RN  Outcome: Progressing  10/15/2024 0355 by Ariadna Mane RN  Outcome: Progressing     Problem: Skin/Tissue Integrity  Goal: Absence of new skin breakdown  Description: 1.  Monitor for areas of redness and/or skin breakdown  2.  Assess vascular access sites hourly  3.  Every 4-6 hours minimum:  Change oxygen saturation probe site  4.  Every 4-6 hours:  If on nasal continuous positive airway pressure, respiratory therapy assess nares and determine need for appliance change or resting period.  10/15/2024 1403 by Hilary Sanchez, RN  Outcome: Progressing  10/15/2024 0355 by Ariadna Mane RN  Outcome: Progressing     
  Problem: Discharge Planning  Goal: Discharge to home or other facility with appropriate resources  10/17/2024 0127 by Ariadna Mane, RN  Outcome: Progressing     Problem: Pain  Goal: Verbalizes/displays adequate comfort level or baseline comfort level  10/17/2024 0127 by Ariadna Mane, RN  Outcome: Progressing     Problem: Skin/Tissue Integrity  Goal: Absence of new skin breakdown  Description: 1.  Monitor for areas of redness and/or skin breakdown  2.  Assess vascular access sites hourly  3.  Every 4-6 hours minimum:  Change oxygen saturation probe site  4.  Every 4-6 hours:  If on nasal continuous positive airway pressure, respiratory therapy assess nares and determine need for appliance change or resting period.  10/17/2024 0127 by Ariadna Mane, RN  Outcome: Progressing     Problem: Safety - Adult  Goal: Free from fall injury  10/17/2024 0127 by Ariadna Mane, RN  Outcome: Progressing     
  Problem: Discharge Planning  Goal: Discharge to home or other facility with appropriate resources  10/17/2024 1057 by Yasemin Naqvi RN  Outcome: Progressing  10/17/2024 0127 by Ariadna Mane RN  Outcome: Progressing     Problem: Pain  Goal: Verbalizes/displays adequate comfort level or baseline comfort level  10/17/2024 1057 by Yasemin Naqvi RN  Outcome: Progressing  10/17/2024 0127 by Ariadna Mane RN  Outcome: Progressing     Problem: Skin/Tissue Integrity  Goal: Absence of new skin breakdown  Description: 1.  Monitor for areas of redness and/or skin breakdown  2.  Assess vascular access sites hourly  3.  Every 4-6 hours minimum:  Change oxygen saturation probe site  4.  Every 4-6 hours:  If on nasal continuous positive airway pressure, respiratory therapy assess nares and determine need for appliance change or resting period.  10/17/2024 1057 by Yasemin Naqvi RN  Outcome: Progressing  10/17/2024 0127 by Ariadna Mane RN  Outcome: Progressing     Problem: Safety - Adult  Goal: Free from fall injury  10/17/2024 1057 by Yasemin Naqvi RN  Outcome: Progressing  10/17/2024 0127 by Ariadna Mane RN  Outcome: Progressing     Problem: ABCDS Injury Assessment  Goal: Absence of physical injury  Outcome: Progressing     Problem: Chronic Conditions and Co-morbidities  Goal: Patient's chronic conditions and co-morbidity symptoms are monitored and maintained or improved  Outcome: Progressing     Problem: Cardiovascular - Adult  Goal: Maintains optimal cardiac output and hemodynamic stability  Outcome: Progressing  Goal: Absence of cardiac dysrhythmias or at baseline  Outcome: Progressing     Problem: Nutrition Deficit:  Goal: Optimize nutritional status  Outcome: Progressing     
  Problem: Discharge Planning  Goal: Discharge to home or other facility with appropriate resources  10/17/2024 1423 by Yasemin Naqvi RN  Outcome: Completed  10/17/2024 1057 by Yasemin Naqvi RN  Outcome: Progressing  10/17/2024 0127 by Ariadna Mane RN  Outcome: Progressing     Problem: Pain  Goal: Verbalizes/displays adequate comfort level or baseline comfort level  10/17/2024 1423 by Yasemin Naqvi RN  Outcome: Completed  10/17/2024 1057 by Yasemin Naqvi RN  Outcome: Progressing  10/17/2024 0127 by Ariadna Mane RN  Outcome: Progressing     Problem: Skin/Tissue Integrity  Goal: Absence of new skin breakdown  Description: 1.  Monitor for areas of redness and/or skin breakdown  2.  Assess vascular access sites hourly  3.  Every 4-6 hours minimum:  Change oxygen saturation probe site  4.  Every 4-6 hours:  If on nasal continuous positive airway pressure, respiratory therapy assess nares and determine need for appliance change or resting period.  10/17/2024 1423 by Yasemin Naqvi RN  Outcome: Completed  10/17/2024 1057 by Yasemin Naqvi RN  Outcome: Progressing  10/17/2024 0127 by Ariadna Mane RN  Outcome: Progressing     Problem: Safety - Adult  Goal: Free from fall injury  10/17/2024 1423 by Yasemin Naqvi RN  Outcome: Completed  10/17/2024 1057 by Yasemin Naqvi RN  Outcome: Progressing  10/17/2024 0127 by Ariadna Mane RN  Outcome: Progressing     Problem: ABCDS Injury Assessment  Goal: Absence of physical injury  10/17/2024 1423 by Yasemin Naqvi RN  Outcome: Completed  10/17/2024 1057 by Yasemin Naqvi RN  Outcome: Progressing     Problem: Chronic Conditions and Co-morbidities  Goal: Patient's chronic conditions and co-morbidity symptoms are monitored and maintained or improved  10/17/2024 1423 by Yasemin Naqvi RN  Outcome: Completed  10/17/2024 1057 by Yasemin Naqvi RN  Outcome: Progressing     Problem: Cardiovascular - Adult  Goal: Maintains optimal cardiac output and hemodynamic stability  10/17/2024 1423 
  Problem: Discharge Planning  Goal: Discharge to home or other facility with appropriate resources  Outcome: Progressing     Problem: Pain  Goal: Verbalizes/displays adequate comfort level or baseline comfort level  Outcome: Progressing     Problem: Skin/Tissue Integrity  Goal: Absence of new skin breakdown  Description: 1.  Monitor for areas of redness and/or skin breakdown  2.  Assess vascular access sites hourly  3.  Every 4-6 hours minimum:  Change oxygen saturation probe site  4.  Every 4-6 hours:  If on nasal continuous positive airway pressure, respiratory therapy assess nares and determine need for appliance change or resting period.  Outcome: Progressing     Problem: ABCDS Injury Assessment  Goal: Absence of physical injury  Outcome: Progressing     Problem: Chronic Conditions and Co-morbidities  Goal: Patient's chronic conditions and co-morbidity symptoms are monitored and maintained or improved  Outcome: Progressing     
  Problem: Discharge Planning  Goal: Discharge to home or other facility with appropriate resources  Outcome: Progressing     Problem: Pain  Goal: Verbalizes/displays adequate comfort level or baseline comfort level  Outcome: Progressing     Problem: Skin/Tissue Integrity  Goal: Absence of new skin breakdown  Description: 1.  Monitor for areas of redness and/or skin breakdown  2.  Assess vascular access sites hourly  3.  Every 4-6 hours minimum:  Change oxygen saturation probe site  4.  Every 4-6 hours:  If on nasal continuous positive airway pressure, respiratory therapy assess nares and determine need for appliance change or resting period.  Outcome: Progressing     Problem: Safety - Adult  Goal: Free from fall injury  Outcome: Progressing     Problem: ABCDS Injury Assessment  Goal: Absence of physical injury  Outcome: Progressing     
  Problem: Discharge Planning  Goal: Discharge to home or other facility with appropriate resources  Outcome: Progressing     Problem: Pain  Goal: Verbalizes/displays adequate comfort level or baseline comfort level  Outcome: Progressing     Problem: Skin/Tissue Integrity  Goal: Absence of new skin breakdown  Description: 1.  Monitor for areas of redness and/or skin breakdown  2.  Assess vascular access sites hourly  3.  Every 4-6 hours minimum:  Change oxygen saturation probe site  4.  Every 4-6 hours:  If on nasal continuous positive airway pressure, respiratory therapy assess nares and determine need for appliance change or resting period.  Outcome: Progressing     Problem: Safety - Adult  Goal: Free from fall injury  Outcome: Progressing     Problem: ABCDS Injury Assessment  Goal: Absence of physical injury  Outcome: Progressing     Problem: Chronic Conditions and Co-morbidities  Goal: Patient's chronic conditions and co-morbidity symptoms are monitored and maintained or improved  Outcome: Progressing     Problem: Cardiovascular - Adult  Goal: Maintains optimal cardiac output and hemodynamic stability  Outcome: Progressing  Goal: Absence of cardiac dysrhythmias or at baseline  Outcome: Progressing     Problem: Nutrition Deficit:  Goal: Optimize nutritional status  Outcome: Progressing     
  Problem: Discharge Planning  Goal: Discharge to home or other facility with appropriate resources  Outcome: Progressing  Flowsheets (Taken 10/16/2024 1143)  Discharge to home or other facility with appropriate resources: Identify barriers to discharge with patient and caregiver     Problem: Pain  Goal: Verbalizes/displays adequate comfort level or baseline comfort level  10/16/2024 1150 by Gilligan, Melissa, RN  Outcome: Progressing  10/16/2024 0044 by Ariadna Mane RN  Outcome: Progressing     Problem: Skin/Tissue Integrity  Goal: Absence of new skin breakdown  Description: 1.  Monitor for areas of redness and/or skin breakdown  2.  Assess vascular access sites hourly  3.  Every 4-6 hours minimum:  Change oxygen saturation probe site  4.  Every 4-6 hours:  If on nasal continuous positive airway pressure, respiratory therapy assess nares and determine need for appliance change or resting period.  10/16/2024 1150 by Gilligan, Melissa, RN  Outcome: Progressing  10/16/2024 0044 by Ariadna Mane RN  Outcome: Progressing     Problem: Safety - Adult  Goal: Free from fall injury  Outcome: Progressing  Flowsheets (Taken 10/16/2024 1147)  Free From Fall Injury: Instruct family/caregiver on patient safety     Problem: ABCDS Injury Assessment  Goal: Absence of physical injury  10/16/2024 1150 by Gilligan, Melissa, RN  Outcome: Progressing  10/16/2024 0044 by Ariadna Mane RN  Outcome: Progressing     Problem: Chronic Conditions and Co-morbidities  Goal: Patient's chronic conditions and co-morbidity symptoms are monitored and maintained or improved  10/16/2024 1150 by Gilligan, Melissa, RN  Outcome: Progressing  Flowsheets (Taken 10/16/2024 1143)  Care Plan - Patient's Chronic Conditions and Co-Morbidity Symptoms are Monitored and Maintained or Improved: Monitor and assess patient's chronic conditions and comorbid symptoms for stability, deterioration, or improvement  10/16/2024 0044 by Ariadna Mane RN  Outcome: 
  Problem: Pain  Goal: Verbalizes/displays adequate comfort level or baseline comfort level  10/11/2024 1956 by hCeryl Daniels RN  Outcome: Progressing  Flowsheets (Taken 10/11/2024 1956)  Verbalizes/displays adequate comfort level or baseline comfort level:   Encourage patient to monitor pain and request assistance   Assess pain using appropriate pain scale   Administer analgesics based on type and severity of pain and evaluate response   Implement non-pharmacological measures as appropriate and evaluate response   Consider cultural and social influences on pain and pain management   Notify Licensed Independent Practitioner if interventions unsuccessful or patient reports new pain     Problem: Safety - Adult  Goal: Free from fall injury  10/11/2024 1956 by Cheryl Daniels RN  Outcome: Progressing  Flowsheets (Taken 10/11/2024 1956)  Free From Fall Injury:   Instruct family/caregiver on patient safety   Based on caregiver fall risk screen, instruct family/caregiver to ask for assistance with transferring infant if caregiver noted to have fall risk factors     Problem: ABCDS Injury Assessment  Goal: Absence of physical injury  10/11/2024 1956 by Cheryl Daniels RN  Outcome: Progressing  Flowsheets (Taken 10/11/2024 1956)  Absence of Physical Injury: Implement safety measures based on patient assessment     Problem: Cardiovascular - Adult  Goal: Maintains optimal cardiac output and hemodynamic stability  Outcome: Progressing  Flowsheets (Taken 10/11/2024 1956)  Maintains optimal cardiac output and hemodynamic stability:   Monitor blood pressure and heart rate   Monitor urine output and notify Licensed Independent Practitioner for values outside of normal range   Assess for signs of decreased cardiac output     
  Problem: Pain  Goal: Verbalizes/displays adequate comfort level or baseline comfort level  10/16/2024 0044 by Ariadna Mane RN  Outcome: Progressing     Problem: Skin/Tissue Integrity  Goal: Absence of new skin breakdown  Description: 1.  Monitor for areas of redness and/or skin breakdown  2.  Assess vascular access sites hourly  3.  Every 4-6 hours minimum:  Change oxygen saturation probe site  4.  Every 4-6 hours:  If on nasal continuous positive airway pressure, respiratory therapy assess nares and determine need for appliance change or resting period.  10/16/2024 0044 by Ariadna Mane, RN  Outcome: Progressing     Problem: Chronic Conditions and Co-morbidities  Goal: Patient's chronic conditions and co-morbidity symptoms are monitored and maintained or improved  10/16/2024 0044 by Ariadna Mane, RN  Outcome: Progressing     
assessment     Problem: Chronic Conditions and Co-morbidities  Goal: Patient's chronic conditions and co-morbidity symptoms are monitored and maintained or improved  Outcome: Progressing     Problem: Cardiovascular - Adult  Goal: Maintains optimal cardiac output and hemodynamic stability  10/12/2024 0434 by Pilar Ray, RN  Outcome: Progressing  Flowsheets (Taken 10/11/2024 1956 by Cheryl Daniels RN)  Maintains optimal cardiac output and hemodynamic stability:   Monitor blood pressure and heart rate   Monitor urine output and notify Licensed Independent Practitioner for values outside of normal range   Assess for signs of decreased cardiac output     Problem: Cardiovascular - Adult  Goal: Absence of cardiac dysrhythmias or at baseline  Outcome: Progressing

## 2024-10-17 NOTE — PROGRESS NOTES
HOSPITALIST PROGRESS NOTE    Patient's name: Deion Knight  : 1950  Admission date: 10/10/2024  Date of service: 10/11/2024   Primary care physician: Dean Alanis APRN - CNP    Assessment   Patient admitted on 10/10/2024     Deion Knight is a 74 y.o. male patient of Dean Alanis APRN - CNP with history of ESRD, CAD s/p PCI, ischemic cardiomyopathy, hypertension presented to [facility] on 10/10/2024 from [facility] ED where he had presented with complaints of shortness of breath while at dialysis.  Patient was found to be hypotensive, EKG suggestive of AVNRT with heart rate of 207.  Patient was given IV fluid, adenosine followed by amiodarone bolus without successful response and was cardioverted.  Patient unfortunately went back to AVNRT after cardioversion and amiodarone drip was started.  Patient was transferred to [facility] for EP evaluation and is admitted to ICU.  Patient underwent successful RF ablation of atrioventricular mio reentry tachycardia.  Patient remains on oral amiodarone in view of LV dysfunction and persistent atrial tachycardia per EP.  Patient remains in ICU for now we will plan to transfer out today.    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  Counseled on smoking cessation.      Follow labs   DVT prophylaxis Heparin  Please see orders for further management and care.  Discharge plan: Possible discharge once cleared by cardiology and nephrology    Patricia  Deion was seen and examined at bedside.  Patient is alert and oriented, denies any chest pain, having trouble with 
      HOSPITALIST PROGRESS NOTE    Patient's name: Deion Knight  : 1950  Admission date: 10/10/2024  Date of service: 10/12/2024   Primary care physician: Dean Alanis APRN - CNP    Assessment   Patient admitted on 10/10/2024     Deion Knight is a 74 y.o. male patient of Dean Alanis APRN - CNP with history of ESRD, CAD s/p PCI, ischemic cardiomyopathy, hypertension presented to [facility] on 10/10/2024 from [facility] ED where he had presented with complaints of shortness of breath while at dialysis.  Patient was found to be hypotensive, EKG suggestive of AVNRT with heart rate of 207.  Patient was given IV fluid, adenosine followed by amiodarone bolus without successful response and was cardioverted.  Patient unfortunately went back to AVNRT after cardioversion and amiodarone drip was started.  Patient was transferred to [facility] for EP evaluation and is admitted to ICU.  Patient underwent successful RF ablation of atrioventricular mio reentry tachycardia.  Patient remains on oral amiodarone in view of LV dysfunction and persistent atrial tachycardia per EP.  Patient remains in ICU for now we will plan to transfer out today.    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  Per patient he has been smoking for 60 years  Nicotine patch ordered.  Counseled on smoking cessation.    ESRD  Continue midodrine  Dialysis schedule per nephrology      Follow labs   DVT prophylaxis Heparin  Please see orders for further management and care.  Discharge plan: Possible discharge over next 24 hours once weaned off oxygen.    Patricia Albarran was seen and examined at bedside.  Patient 
      HOSPITALIST PROGRESS NOTE    Patient's name: Deion Knight  : 1950  Admission date: 10/10/2024  Date of service: 10/13/2024   Primary care physician: Dean Alanis APRN - CNP    Assessment   Patient admitted on 10/10/2024     Deion Knight is a 74 y.o. male patient of Dean Alanis APRN - CNP with history of ESRD, CAD s/p PCI, ischemic cardiomyopathy, hypertension presented to [facility] on 10/10/2024 from [facility] ED where he had presented with complaints of shortness of breath while at dialysis.  Patient was found to be hypotensive, EKG suggestive of AVNRT with heart rate of 207.  Patient was given IV fluid, adenosine followed by amiodarone bolus without successful response and was cardioverted.  Patient unfortunately went back to AVNRT after cardioversion and amiodarone drip was started.  Patient was transferred to [facility] for EP evaluation and is admitted to ICU.  Patient underwent successful RF ablation of atrioventricular mio reentry tachycardia.  Patient remains on oral amiodarone in view of LV dysfunction and persistent atrial tachycardia per EP.  Patient remains in ICU for now we will plan to transfer out today.    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  Per patient he has been smoking for 60 years  Nicotine patch ordered.  Counseled on smoking cessation.    ESRD  Continue midodrine  Dialysis schedule per nephrology    Dizziness  Patient complaining of dizziness with movement of head side-to-side.  Start on meclizine  Ordered MRI brain to rule out posterior stroke.  Will need ENT follow-up as outpatient.      Follow labs   DVT prophylaxis 
     Pulmonary Critical Care Medicine           PULMONARY  CRITICAL CARE   SERVICE DAILY PROGRESS  NOTE     10/13/2024   Hospital LOS:  LOS: 3 days     Impression / Recommendations:  Acute on chronic respiratory insufficiency secondary to multifactorial etiology.   Heart failure with reduced ejection fraction to 15 to 20% leading to acute on chronic pulmonary vascular congestion with underlying ESRD on intermittent hemodialysis  Recurrent SVT      -Continue diuresis  -He will not be a candidate for endobronchial valve placement for COPD secondary to underlying severe systolic dysfunction  -Continue Daliresp  -Will need to enroll in pulmonary rehabilitation to improve quality of life  -Continue Pulmicort and Brovana  -Continue DuoNebs  -Complete PFTs as an outpatient  -PT OT  -Out of bed to chair, ambulate as tolerated      Interval History/Event Changes:  No new symptoms  Comfortable at rest  Continues to be on 2 L nasal cannula oxygen, oxygenating well    Allergies  No Known Allergies    Review of Systems    A pertinent review of systems was performed and was otherwise non-contributory.    Vitals-     /60   Pulse 86   Temp 97.6 °F (36.4 °C) (Temporal)   Resp 16   Ht 1.676 m (5' 5.98\")   Wt 68.5 kg (151 lb 1.6 oz)   SpO2 98%   BMI 24.40 kg/m²    Tmax: Temp (24hrs), Av.6 °F (36.4 °C), Min:96.9 °F (36.1 °C), Max:98.5 °F (36.9 °C)      Hemodynamics:  Cuff:   Systolic (24hrs), Av , Min:100 , Max:114   /Diastolic (24hrs), Av, Min:57, Max:67    Cuff MAP:MAP (mmHg)  Av.3  Min: 59  Max: 123  P:   Pulse  Av.3  Min: 62  Max: 86      Airway:     Observed RR: Resp  Av.3  Min: 16  Max: 20   Observed O2 sats: SpO2  Av.9 %  Min: 67 %  Max: 100 %      Intake/Output Summary (Last 24 hours) at 10/13/2024 1525  Last data filed at 10/12/2024 1918  Gross per 24 hour   Intake 300 ml   Output 2200 ml   Net -1900 ml         Physical exam-  General appearance: Chronically ill appearing, alert, 
     Pulmonary Critical Care Medicine           PULMONARY  CRITICAL CARE   SERVICE DAILY PROGRESS  NOTE     10/14/2024   Hospital LOS:  LOS: 4 days     Impression / Recommendations:  Acute on chronic respiratory insufficiency secondary to multifactorial etiology.   Heart failure with reduced ejection fraction to 15 to 20% leading to acute on chronic pulmonary vascular congestion with underlying ESRD on intermittent hemodialysis  Recurrent SVT      -Repeat CXR in am  -Continue diuresis  -He will not be a candidate for endobronchial valve placement for COPD secondary to underlying severe systolic dysfunction  -Continue Daliresp  -Will need to enroll in pulmonary rehabilitation to improve quality of life  -Continue Nebulized bronchodilators  -systemic steroids, prednisone 40 mg po daily  -Continue DuoNebs  -Complete PFTs as an outpatient  -PT OT  -Out of bed to chair, ambulate as tolerated  -incentive spirometer      Interval History/Event Changes:  Feeling short of breath just having returned from dialysis, nurse to call for breathing treatment  4 L nasal cannula oxygen    Allergies  No Known Allergies    Review of Systems    A pertinent review of systems was performed and was otherwise non-contributory.    Vitals-     BP (!) 95/58   Pulse 57   Temp 97.5 °F (36.4 °C) (Temporal)   Resp 23   Ht 1.676 m (5' 5.98\")   Wt 68.3 kg (150 lb 9.2 oz)   SpO2 90%   BMI 24.32 kg/m²    Tmax: Temp (24hrs), Av.3 °F (36.3 °C), Min:96.9 °F (36.1 °C), Max:98.5 °F (36.9 °C)      Hemodynamics:  Cuff:   Systolic (24hrs), Av , Min:94 , Max:121   /Diastolic (24hrs), Av, Min:53, Max:96    Cuff MAP:MAP (mmHg)  Av.3  Min: 59  Max: 123  P:   Pulse  Av.6  Min: 56  Max: 72      Airway:     Observed RR: Resp  Av.5  Min: 16  Max: 28   Observed O2 sats: SpO2  Av.9 %  Min: 67 %  Max: 100 %      Intake/Output Summary (Last 24 hours) at 10/14/2024 1522  Last data filed at 10/14/2024 1109  Gross per 24 hour   Intake 
    [facility]  PULMONARY/CRITICAL CARE PROGRESS NOTE    Patient: Deion Knight  MRN: [id number]  : 1950    Encounter Date: 10/14/2024  Encounter Time: 3:16 PM     Date of Admission: .10/10/2024  7:11 AM    Consulting Physician:  Primary Care Physician:     Dean Alanis APRN - CNP     [phone]     [phone]    Reason for Consultation: Dyspnea     Problem List:  Patient Active Problem List   Diagnosis    ANGIE (acute kidney injury) ([facility])    Acute renal failure (ARF) ([facility])    Acute decompensated heart failure ([facility])    Shortness of breath    NICM (nonischemic cardiomyopathy) ([facility])    Moderate protein-calorie malnutrition ([facility])    CAD (coronary artery disease)    NSTEMI (non-ST elevated myocardial infarction) ([facility])    SVT (supraventricular tachycardia) ([facility])    Chronic combined systolic and diastolic congestive heart failure ([facility])    Ischemic cardiomyopathy    ESRD (end stage renal disease) ([facility])    AVNRT (AV mio re-entry tachycardia) ([facility])    HFrEF (heart failure with reduced ejection fraction) ([facility])    Anemia    End stage renal disease ([facility])    Hypotension     SUBJECTIVE: Patient seen and examined.  Overnight the patient had no shortness of breath, cough, increased work of breathing.    HOME MEDICATIONS:  Prior to Admission medications    Medication Sig Start Date End Date Taking? Authorizing Provider   amiodarone (CORDARONE) 200 MG tablet Take 1 tablet by mouth three times a day with food until 10/18/24, then take 1 tablet by mouth daily thereafter. 10/11/24  Yes Jose Antonio Monique, DO   metoprolol succinate (TOPROL XL) 50 MG extended release tablet Take 1 tablet by mouth daily 10/4/24  Yes Mauro Du, DO   clopidogrel (PLAVIX) 75 MG tablet Take 1 tablet by mouth daily 24  Yes Zoey Kendall MD   Vitamin D (CHOLECALCIFEROL) 50 MCG ( UT) TABS tablet Take 1 tablet by mouth daily 24  Yes Zoey Kendall MD   tiotropium (SPIRIVA RESPIMAT) 2.5 MCG/ACT AERS inhaler Inhale 2 puffs 
    [facility]  PULMONARY/CRITICAL CARE PROGRESS NOTE    Patient: Deion Knight  MRN: [id number]  : 1950    Encounter Date: 10/17/2024  Encounter Time: 11:53 AM     Date of Admission: .10/10/2024  7:11 AM    Consulting Physician:  Primary Care Physician:     Dean Alanis APRN - CNP     [phone]     [phone]    Reason for Consultation: Dyspnea     Problem List:  Patient Active Problem List   Diagnosis    ANGIE (acute kidney injury) ([facility])    Acute renal failure (ARF) ([facility])    Acute decompensated heart failure ([facility])    Shortness of breath    NICM (nonischemic cardiomyopathy) ([facility])    Moderate protein-calorie malnutrition ([facility])    CAD (coronary artery disease)    NSTEMI (non-ST elevated myocardial infarction) ([facility])    SVT (supraventricular tachycardia) ([facility])    Chronic combined systolic and diastolic congestive heart failure ([facility])    Ischemic cardiomyopathy    ESRD (end stage renal disease) ([facility])    AVNRT (AV mio re-entry tachycardia) ([facility])    HFrEF (heart failure with reduced ejection fraction) ([facility])    Anemia    End stage renal disease ([facility])    Hypotension     SUBJECTIVE: Patient seen and examined.  Overnight the patient had no shortness of breath, cough, increased work of breathing.    HOME MEDICATIONS:  Prior to Admission medications    Medication Sig Start Date End Date Taking? Authorizing Provider   ALPRAZolam (XANAX) 0.25 MG tablet Take 1 tablet by mouth 3 times daily as needed for Anxiety for up to 30 days. Max Daily Amount: 0.75 mg 10/16/24 11/15/24 Yes Juan Moss MD   buPROPion (WELLBUTRIN XL) 150 MG extended release tablet Take 1 tablet by mouth every morning 10/15/24 1/13/25 Yes Juan Moss MD   amiodarone (CORDARONE) 200 MG tablet Take 1 tablet by mouth three times a day with food until 10/18/24, then take 1 tablet by mouth daily thereafter. 10/11/24  Yes Jose Antonio Monique DO   metoprolol succinate (TOPROL XL) 50 MG extended release tablet Take 1 tablet by mouth daily 
  OCCUPATIONAL THERAPY INITIAL EVALUATION    [facility] [place]  [address], [place]        Date:10/15/2024                                                  Patient Name: Deion Knight    MRN: [id number]    : 1950    Room: [address] [place] [address]      Evaluating OT: Eliz Ríos OTR/L; [id number]       Referring Provider: Juan Moss MD     Specific Provider Orders/Date: OT Eval and Treat 10/11/24 1445       Diagnosis: AVNRT (AV mio re-entry tachycardia),  HFrEF (heart failure with reduced ejection fraction), Anemia, End stage renal disease, Hypotension.    Surgery:   10/9/24 Cardioversion.  10/10/24   Ep study complete      Ablation SVT     Pertinent Medical History:  has a past medical history of COPD (chronic obstructive pulmonary disease) ([facility]), Diabetes mellitus ([facility]), Dialysis patient ([facility]), H/O heart artery stent, Hemodialysis patient ([facility]), Hyperlipidemia, and Renal failure.     Recommended Adaptive Equipment: TBD     Precautions:  Fall Risk, HD, TSM, cognition, +alarms, O2, continuous pulse ox     Assessment of current deficits    [x] Functional mobility  [x]ADLs  [x] Strength               [x]Cognition    [x] Functional transfers   [x] IADLs         [x] Safety Awareness   [x]Endurance    [x] Fine Coordination              [x] Balance      [] Vision/perception   []Sensation     []Gross Motor Coordination  [] ROM  [] Delirium                   [] Motor Control     OT PLAN OF CARE   OT POC based on physician orders, patient diagnosis and results of clinical assessment    Frequency/Duration 1-3 days/wk for 2 weeks PRN   Specific OT Treatment Interventions to include:   * Instruction/training on adapted ADL techniques and AE recommendations to increase functional independence within precautions       * Training on energy conservation strategies, correct breathing pattern and techniques to improve independence/tolerance for self-care routine  * 
 Messaged Jose Brian NP re: \"Pt having shortness of breath after coughing and states he feels like there is mucus in his lungs that he needs to expel. Can pt have mucinex or another breathing tx? He already received his breathing treatments for the night.\"  
2nd attempt to call nurse to nurse to [facility]  
4 Eyes Skin Assessment     NAME:  Deion Knight  YOB: 1950  MEDICAL RECORD NUMBER:  [id number]    The patient is being assessed for  Transfer to New Unit    I agree that at least one RN has performed a thorough Head to Toe Skin Assessment on the patient. ALL assessment sites listed below have been assessed.      Areas assessed by both nurses:    Head, Face, Ears, Shoulders, Back, Chest, Arms, Elbows, Hands, Sacrum. Buttock, Coccyx, Ischium, Legs. Feet and Heels, and Under Medical Devices         Does the Patient have a Wound? Yes wound(s) were present on assessment. LDA wound assessment was Initiated and completed by RN       Conrad Prevention initiated by RN: Yes  Wound Care Orders initiated by RN: No    Pressure Injury (Stage 3,4, Unstageable, DTI, NWPT, and Complex wounds) if present, place Wound referral order by RN under : No    New Ostomies, if present place, Ostomy referral order under : No     Nurse 1 eSignature: Electronically signed by Pilar Ray RN on 10/12/24 at 12:16 AM EDT    **SHARE this note so that the co-signing nurse can place an eSignature**    Nurse 2 eSignature: Electronically signed by Juan Vázquez RN on 10/12/24 at 12:31 AM EDT  
4 Eyes Skin Assessment     NAME:  Deion Knight  YOB: 1950  MEDICAL RECORD NUMBER:  [id number]    The patient is being assessed for  Admission    I agree that at least one RN has performed a thorough Head to Toe Skin Assessment on the patient. ALL assessment sites listed below have been assessed.      Areas assessed by both nurses:    Head, Face, Ears, Shoulders, Back, Chest, Arms, Elbows, Hands, Sacrum. Buttock, Coccyx, Ischium, Legs. Feet and Heels, and Under Medical Devices         Does the Patient have a Wound? Yes wound(s) were present on assessment. LDA wound assessment was Initiated and completed by RN       Conrad Prevention initiated by RN: Yes  Wound Care Orders initiated by RN: Yes    Pressure Injury (Stage 3,4, Unstageable, DTI, NWPT, and Complex wounds) if present, place Wound referral order by RN under : Yes    New Ostomies, if present place, Ostomy referral order under : No     Nurse 1 eSignature: Electronically signed by Stacy Jung RN on 10/10/24 at 11:44 AM EDT    **SHARE this note so that the co-signing nurse can place an eSignature**    Nurse 2 eSignature: Electronically signed by Audelia Schmitz RN on 10/10/24 at 11:46 AM EDT    
Ambulatory pulse ox performed.    Pt on room air at rest   87%  Pt on 3 liters at rest   92%   Pt ambulating on 3 liters 87%.   Pt recovering on 3 liters  88%. Increased to 4 liters and allowed to recover back to 92%. Rn notified.   
Attempted to call nurse to nurse to [facility] [phone] was sent to an answering service. Message left for a return call.  
CLINICAL PHARMACY NOTE: MEDS TO BEDS    Total # of Prescriptions Filled: 1   The following medications were delivered to the patient:  Bupropion  mg    Additional Documentation:   Delivered to pt  
Cardiology, Electrophysiology and Nephrology were all consulted @ 08:30 am.  
DVT Prophylaxis Adjustment Policy (DVT Prophylaxis)     This patient is on DVT Prophylaxis medication that requires a dose adjustment      Date Body Weight IBW  Adjusted BW SCr  CrCl Dialysis status   10/10/2024 61.2 kg (135 lb) Ideal body weight: 63.8 kg (140 lb 10.5 oz) Serum creatinine: 5.1 mg/dL (H) 10/10/24 1113  Estimated creatinine clearance: 11 mL/min (A) HD       Pharmacy has dose-adjusted the DVT Prophylaxis regimen to match   the recommendations from the following table        Ordered Medication:Lovenox 30mg daily    Order Changed/converted to: Heparin 5000 Units TID      These changes were made per protocol according to the [facility] Pharmacist   Review for Appropriate Use and Automatic Dose Adjustments of   Subcutaneous Anticoagulants Policy     *Please note this dose may need readjusted if patient's condition changes.    Please contact pharmacy with any questions regarding these changes.    Hilary Machado RPH  10/10/2024  7:03 PM    
[facility] PHYSICIANS- [facility] [place] [place] Electrophysiology  Consultation Report  PATIENT: Deion Knight  MEDICAL RECORD NUMBER: [id number]  DATE OF SERVICE:  10/11/2024  ATTENDING ELECTROPHYSIOLOGIST: Jose Antonio Monique DO  PRIMARY ELECTROPHYSIOLOGIST: Jose Antonio Monique DO  REFERRING PHYSICIAN: Jacinta Perales MD and Dean Alanis APRN - CNP  CHIEF COMPLAINT: sent from dialysis for SVT    HPI: Deion Knight is a 74 y.o. male with a history of SVT-adenosine sensitive, NYHA class III HFrEF-ischemic sp MARTIN x 2 ostial-mid RCA (9/3/2024), and ESRD on HD.  He is managed by Dr. Zee with aspirin 81 mg daily, atorvastatin 80 mg daily, clopidogrel 75 mg daily, ezetimibe 10 mg daily, Imdur 30 mg daily, metoprolol XL 50 mg daily, midodrine 5 mg 3 times daily, and PPI.  In 9/2024, he was diagnosed with HFrEF-ischemic, which was treated with MARTIN x 2 to the ostial-mid RCA.  TTE at that time reported LVEF of 15-20%.  Later that month, he presented with SVT, which was terminated with adenosine 12 mg IV after failing 6 mg IV, but returned shortly after.  He was evaluated by cardiology, who initiated amiodarone IV and DCCV.  Metoprolol dose was titrated, then he was discharged.  No rhythm strips of SVT were available for my review.  On 10/9/2024, patient presented to [facility] with recurrence of SVT at 186 bpm associated with hypotension (80/60 mmHg).          He was treated with adenosine 6 mg IV x 1 without change, so DCCV restored sinus, but returned to SVT shortly after.        Amiodarone was initiated and DCCV again restored sinus.  He was transferred to [facility] for EP evaluation.  EP service is now consulted further evaluation/management of dysrhythmias.  Patient denies any other complaints at this time.    10/11/2024: EP study performed yesterday.  Atypical AVNRT reportedly induced and treated with slow pathway modification.  During the procedure, patient reportedly 
Messaged Jose Brain NP regarding manual BP of 84/40. No orders placed  
Messaged via [employer] re: .   Ordered long acting insulin.Isabella Colmenares RN    
Occupational Therapy  OT consult received to eval/treat and chart review complete. Attempted OT evaluation, patient off floor at dialysis, unavailable. OT to re-attempt at a later date/time as able and appropriate.     Makenzie Donald OTR/L  License Number: [id number]    
Patient at dialysis.  
Patient belongings at bedside: blanket, pillow, pants/belt, purse, cane, headphones/earbuds, footwear, undergarments, respiratory medications, cell phone.     Security secured valuables. Receipt number: , Officer 117 on 10/10  
Patient was exampled the importance of holding still during the MRI exam. Patient was checked on multiple times throughout the exam and reminded to hold still. Switched to propeller protocol. Best images obtain.  
Perfect Serve sent to Dr Moss.  Updated on patient requesting additional education from [employer] representative.  Rep to come in either this evening or early tomorrow.    
Physical Therapy  Physical Therapy Initial Assessment     Name: Deion Knight  : 1950  MRN: [id number]      Date of Service: 10/15/2024    Evaluating PT:  Isaac Forbes PT, DPT    Room #:  6518/6518-A  Diagnosis:  SVT (supraventricular tachycardia) ([facility]) [I47.10]  PMHx/PSHx:  ESRD, CAD, ischemic cardiomyopathy, h/o HTN but now hypotensive, GDMT  Procedure/Surgery:  10/10 EP study complete, ablation SVT  Precautions:  fall risk, O2, alarms, O2, cognition, hemodialysis, telesitter  Equipment Owned: SPC, walker  Equipment Needs:  TBD    SUBJECTIVE:    Pt lives alone in a mobile home with 4 steps to enter and 2 handrail.  Bed/bath is on main floor.  Pt ambulated with SPC PTA, has only been using it for about a week.    OBJECTIVE:   Initial Evaluation  Date: 10/15/24 Treatment Short Term/ Long Term   Goals   AM-PAC 6 Clicks 15/24     Was pt agreeable to Eval/treatment? yes     Does pt have pain? No pain     Bed Mobility  Rolling: NT  Supine to sit: SBA  Sit to supine: NT  Scooting: SBA  Rolling: Ind  Supine to sit: Ind  Sit to supine: Ind  Scooting: Ind   Transfers Sit to stand: Lauren  Stand to sit: Lauren  Stand pivot: NT  Sit to stand: Ind  Stand to sit: Ind  Stand pivot: Ind with AAD   Ambulation    25' x2 with SPC ModA  150' with AAD SBA   Stair negotiation: ascended and descended  NT  4 steps with 2 handrail SBA     Strength/ROM:   BLE gross strength 3/5  BLE ROM WFL    Balance:   Static Sitting: Ind  Dynamic Sitting: SBA  Static Standing: Lauren with SPC  Dynamic Standing: ModA with SPC    Pt is A & O x 3  Sensation:  Pt reports numbness in LLE foot  Edema:  LLE swelling    Vitals:  SpO2 and HR were stable during session  BP during seated break during ambulation 116/54, HR 62  BP after ambulation in bedside chair 106/60, HR 59    Therapeutic Exercises:    Bed mobility: supine>sit, cued for EOB positioning  Transfers: STS x2, cued for hand placement and postural correction  Ambulation: 25' x2, cued for energy 
[facility] called pt  time 230pm   
Pt agreeable to wear heart monitor. Still refusing IV at this time    Jennifer Hill RN    
Pt arrived to [facility] with the following belongings: cane, jacket, shirt, pants, socks, belt, phone, , blanket, glasses, life vest, pillows, and purse. Pt oriented to room and all questions answered.  
Pt refusing IV placement. Educated pt on reason we need IV access. Still refusing     Jennifer Hill RN    
Pt refusing to wear heart monitor    Jennifer Hill, RN    
Pt ripped out IV and pulled heart monitor and zio patch off. Refusing new IV to be put in.    Jennifer Hill RN    
Received call from [facility]; New ETA 4:15 pm  
Renal Dose Adjustment Policy (Generic)     This patient is on medication that requires renal, weight, and/or indication dose adjustment.      Date Body Weight IBW  Adjusted BW SCr  CrCl Dialysis status   10/10/2024 56.6 kg (124 lb 12.5 oz) Ideal body weight: 63.8 kg (140 lb 10.5 oz) Serum creatinine: 3.5 mg/dL (H) 10/09/24 0718  Estimated creatinine clearance: 15 mL/min (A) HD       Pharmacy has dose-adjusted the following medication(s):    Date Previous Order Adjusted Order   10/10/2024 Cefepime 1000 mg q12h Cefepime 1000 mg q24h       These changes were made per protocol according to the [facility]   Automatic Renal Dose Adjustment Policy.     *Please note this dose may need readjusted if patient's condition changes.    Please contact pharmacy with any questions regarding these changes.    Cierra Kinsey RPH  10/10/2024  11:22 AM    
Report called to [facility] from 7933. Transfer put in  
Returned to 6518 from dialysis.    
Sleep lab notified of consult, requested information faxed over.  
Spiritual Health History and Assessment/Progress Note  [facility] [place]    (P) Spiritual/Emotional Needs,  ,  ,      Name: Deion Knight MRN: [id number]    Age: 74 y.o.     Sex: male   Language: English   Roman Catholic: Oriental orthodox   AVNRT (AV mio re-entry tachycardia) (Formerly [facility])     Date: 10/15/2024                           Spiritual Assessment began in [facility] PCCU 1        Referral/Consult From: (P) Rounding   Encounter Overview/Reason: (P) Spiritual/Emotional Needs  Service Provided For: (P) Patient    Lenore, Belief, Meaning:   Patient identifies as spiritual and is connected with a lenore tradition or spiritual practice  Family/Friends No family/friends present      Importance and Influence:  Patient has spiritual/personal beliefs that influence decisions regarding their health  Family/Friends No family/friends present    Community:  Patient feels well-supported. Support system includes: Extended family  Family/Friends No family/friends present    Assessment and Plan of Care:     Patient Interventions include: Facilitated expression of thoughts and feelings and Explored spiritual coping/struggle/distress  Family/Friends Interventions include: No family/friends present    Patient Plan of Care: No future visits per patient/family request  Family/Friends Plan of Care: No family/friends present    Electronically signed by MARGE LOPEZ on 10/15/2024 at 2:42 PM   
[facility]  Nephrology Progress Note    Patient's Name: Deion Knight    History of Present Illness from 10/9 Consult Note:    \"Deion Knight is a 74 y.o. male with ESRD on HD having been initiated on hemodialysis in August 2024, coronary artery disease status post stent placement, hyperlipidemia, hypertension, type 2 diabetes, COPD, GERD.     He dialyzes Monday, Wednesday, and Friday at [facility].  His last full dialysis treatment was last Friday.  He had shortness of breath on Monday 1 hour into treatment and was sent to the emergency department.  Was discharged at that time.  Did not require admission.  He was told to come back to dialysis on Wednesday.  He went to dialysis this morning but was short of breath, heart rate was in the high 100s, approaching 200s.  He was found to be in SVT.  He came to the emergency department.  He was cardioverted after receiving adenosine 6 mg x 1 and having an unsuccessful cardioversion.  He then had a second conversion this morning which was successful.  Nephrology consulted for dialysis needs.  Patient has a pending transfer to [facility] for electrophysiology consult.  However, bed is not available.  When he was seen, he was mildly hypotensive, 90s over 60s.  He was on supplemental oxygen.  He is a little short of breath.  Denies chest pain.  Endorses some fluttering which is since improved.  No nausea, vomiting, dizziness, lightheadedness or falls.  Remainder review of systems unremarkable.  Besides hypocalcemia, the patient does not have any significant electrolyte abnormalities.\"  History of Present Illness from 10/10 Note:    \"Deion Knight is a 74 y.o. male with a history of ESRD, HD dependent at [facility] 1 MWF, CAD S/PE PCI 9/24, paroxysmal SVT, hypertension, orthostatic hypotension, type 2 diabetes mellitus, COPD and several other medical problems listed below.  Patient presented for his usual hemodialysis treatment last Monday and 1 hours with course 
[facility]  Nephrology Progress Note    Patient's Name: Deion Knight    History of Present Illness from 10/9 Consult Note:    \"Deion Knight is a 74 y.o. male with ESRD on HD having been initiated on hemodialysis in August 2024, coronary artery disease status post stent placement, hyperlipidemia, hypertension, type 2 diabetes, COPD, GERD.     He dialyzes Monday, Wednesday, and Friday at [facility].  His last full dialysis treatment was last Friday.  He had shortness of breath on Monday 1 hour into treatment and was sent to the emergency department.  Was discharged at that time.  Did not require admission.  He was told to come back to dialysis on Wednesday.  He went to dialysis this morning but was short of breath, heart rate was in the high 100s, approaching 200s.  He was found to be in SVT.  He came to the emergency department.  He was cardioverted after receiving adenosine 6 mg x 1 and having an unsuccessful cardioversion.  He then had a second conversion this morning which was successful.  Nephrology consulted for dialysis needs.  Patient has a pending transfer to [facility] for electrophysiology consult.  However, bed is not available.  When he was seen, he was mildly hypotensive, 90s over 60s.  He was on supplemental oxygen.  He is a little short of breath.  Denies chest pain.  Endorses some fluttering which is since improved.  No nausea, vomiting, dizziness, lightheadedness or falls.  Remainder review of systems unremarkable.  Besides hypocalcemia, the patient does not have any significant electrolyte abnormalities.\"  History of Present Illness from 10/10 Note:    \"Deion Knight is a 74 y.o. male with a history of ESRD, HD dependent at [facility] 1 MWF, CAD S/PE PCI 9/24, paroxysmal SVT, hypertension, orthostatic hypotension, type 2 diabetes mellitus, COPD and several other medical problems listed below.  Patient presented for his usual hemodialysis treatment last Monday and 1 hours with course 
10/4/24  Yes Mauro Du DO   clopidogrel (PLAVIX) 75 MG tablet Take 1 tablet by mouth daily 9/5/24  Yes Zoey Kendall MD   Vitamin D (CHOLECALCIFEROL) 50 MCG (2000 UT) TABS tablet Take 1 tablet by mouth daily 9/5/24  Yes Zoey Kendall MD   tiotropium (SPIRIVA RESPIMAT) 2.5 MCG/ACT AERS inhaler Inhale 2 puffs into the lungs daily 8/31/24  Yes Mauro Du DO   atorvastatin (LIPITOR) 80 MG tablet Take 1 tablet by mouth daily   Yes Debbie Mohan MD   albuterol sulfate HFA (VENTOLIN HFA) 108 (90 Base) MCG/ACT inhaler Inhale 2 puffs into the lungs every 6 hours as needed for Wheezing or Shortness of Breath   Yes Debbie Mohan MD   aspirin 81 MG EC tablet Take 1 tablet by mouth daily   Yes Debbie Mohan MD   budesonide-formoterol (SYMBICORT) 160-4.5 MCG/ACT AERO Inhale 1 puff into the lungs 2 times daily   Yes ProviderDebbie MD   ezetimibe (ZETIA) 10 MG tablet Take 1 tablet by mouth daily   Yes Debbie Mohan MD   Nutritional Supplements (GLUCERNA) LIQD Take 1 Canister by mouth Daily with lunch   Yes Debbie Mohan MD   isosorbide mononitrate (IMDUR) 30 MG extended release tablet Take 1 tablet by mouth daily   Yes ProviderDebbie MD   Multiple Vitamins-Minerals (THERAPEUTIC MULTIVITAMIN-MINERALS) tablet Take 1 tablet by mouth daily   Yes ProviderDebbie MD   nicotine (NICODERM CQ) 14 MG/24HR Place 1 patch onto the skin daily 10/5/24   Mauro Du DO   midodrine (PROAMATINE) 5 MG tablet Take 1 tablet by mouth 3 times daily (with meals) 10/2/24   Mauro Du DO   SITagliptin (JANUVIA) 25 MG tablet Take 1 tablet by mouth daily  Patient not taking: Reported on 10/10/2024 8/31/24   Mauro Du DO   pantoprazole (PROTONIX) 40 MG tablet Take 1 tablet by mouth daily    ProviderDebbie MD       CURRENT MEDICATIONS:  Current Facility-Administered Medications: ALPRAZolam (XANAX) tablet 0.25 mg, 0.25 mg, Oral, TID PRN  budesonide 
0636    LORazepam (ATIVAN) injection 0.5 mg  0.5 mg IntraVENous Once Juan Moss MD        epoetin jaime-epbx (RETACRIT) injection 3,000 Units  3,000 Units SubCUTAneous Once per day on Monday Wednesday Friday Laurel Coppola APRN - CNP        insulin glargine (LANTUS) injection vial 8 Units  8 Units SubCUTAneous Nightly Nicolas Franks MD   8 Units at 10/13/24 2119    guaiFENesin tablet 400 mg  400 mg Oral 4x Daily PRN Jayda Clinton APRN - CNP   400 mg at 10/12/24 0405    ipratropium 0.5 mg-albuterol 2.5 mg (DUONEB) nebulizer solution 1 Dose  1 Dose Inhalation Q4H WA RT Juan Moss MD   1 Dose at 10/13/24 2108    predniSONE (DELTASONE) tablet 40 mg  40 mg Oral Daily Juan Moss MD   40 mg at 10/13/24 0823    nicotine (NICODERM CQ) 14 MG/24HR 1 patch  1 patch TransDERmal Daily Juan Moss MD   1 patch at 10/13/24 0825    white petrolatum ointment   Topical BID Juan Moss MD   Given at 10/13/24 2135    And    white petrolatum ointment   Topical TID PRN Juan Moss MD        metoprolol succinate (TOPROL XL) extended release tablet 50 mg  50 mg Oral Daily Jose Antonio Monique, DO   50 mg at 10/13/24 0824    isosorbide mononitrate (IMDUR) extended release tablet 30 mg  30 mg Oral Daily Jose Antonio Monique, DO   30 mg at 10/13/24 0823    sodium chloride flush 0.9 % injection 5-40 mL  5-40 mL IntraVENous 2 times per day Soumya Wilson MD   10 mL at 10/13/24 2135    sodium chloride flush 0.9 % injection 5-40 mL  5-40 mL IntraVENous PRN Soumya Wilson MD        0.9 % sodium chloride infusion   IntraVENous PRN Soumya Wilson MD        polyethylene glycol (GLYCOLAX) packet 17 g  17 g Oral Daily PRN Soumya Wilson MD   17 g at 10/11/24 1835    acetaminophen (TYLENOL) tablet 650 mg  650 mg Oral Q6H PRN Soumya Wilson MD        Or    acetaminophen (TYLENOL) suppository 650 mg  650 mg Rectal Q6H PRN Soumya Wilson MD        doxycycline (VIBRAMYCIN) 100 mg in sodium chloride 0.9 % 100 mL IVPB ([id number])  100 
Normal bowel sounds.   Skin: Warm and dry. No nodule on exposed extremities. No rash on exposed extremities.  Ext: No cyanosis, clubbing, edema   Neuro: Awake, alert and oriented      Data:   Labs:  Lab Results   Component Value Date     10/13/2024     (L) 10/12/2024     10/11/2024    K 4.3 10/13/2024    K 4.5 10/12/2024    K 4.0 10/11/2024    CL 95 (L) 10/13/2024    CO2 21 (L) 10/13/2024    CO2 19 (L) 10/12/2024    CO2 19 (L) 10/11/2024    CREATININE 4.7 (H) 10/13/2024    CREATININE 3.8 (H) 10/12/2024    CREATININE 5.5 (H) 10/11/2024    BUN 35 (H) 10/13/2024    BUN 20 10/12/2024    BUN 34 (H) 10/11/2024    GLUCOSE 143 (H) 10/13/2024    GLUCOSE 96 10/12/2024    GLUCOSE 132 (H) 10/11/2024    PHOS 4.5 10/13/2024    PHOS 5.4 (H) 10/11/2024    PHOS 1.7 (L) 10/09/2024    WBC 7.6 10/13/2024    WBC 7.8 10/11/2024    WBC 6.8 10/10/2024    HGB 8.9 (L) 10/13/2024    HGB 8.9 (L) 10/11/2024    HGB 7.0 (L) 10/10/2024    HCT 29.1 (L) 10/13/2024    HCT 30.3 (L) 10/11/2024    HCT 24.5 (L) 10/10/2024    MCV 93.9 10/13/2024     10/13/2024         Imaging:  No results found.    Assessment/Plans    1.  ESRD, HD MWF  -Will continue maintenance hemodialysis  -Underwent isolated UF yesterday with 1.9 L removed  -Next planned hemodialysis on 10/14    2.  Recurrent SVT  -He is currently on metoprolol and amiodarone infusion  -Ablation performed 10; EP study 10/11, findings noted  -EP is following-    3.BP/volume status  -Patient was hypotensive on initial presentation to [facility]  -Blood pressure with an improving trend with systolic mainly in the 100s       3-Electrolyte/Acid-base disorders  -Satisfactory parameters, mild metabolic acidosis in setting of missed HD  CO2 at presentation 19 mmol/L and is 19 mmol/L today     4-CKD-MBD  -Patient was both hypocalcemic and hypophosphatemic  -Supplement calcium gluconate as needed  Phosphorus 4.5 mg/dL today  Not currently on phosphorus binders as phosphorus was low at 
rash on exposed extremities.  Ext: No cyanosis, clubbing, edema   Neuro: Awake. Follows commands. Positive pupils/gag/corneals. Normal pain response.      Data:   Labs:  Lab Results   Component Value Date     10/11/2024     10/10/2024     10/09/2024    K 4.0 10/11/2024    K 3.9 10/10/2024    K 3.6 10/09/2024     10/11/2024    CO2 19 (L) 10/11/2024    CO2 20 (L) 10/10/2024    CO2 18 (L) 10/09/2024    CREATININE 5.5 (H) 10/11/2024    CREATININE 5.1 (H) 10/10/2024    CREATININE 3.5 (H) 10/09/2024    BUN 34 (H) 10/11/2024    BUN 28 (H) 10/10/2024    BUN 19 10/09/2024    GLUCOSE 132 (H) 10/11/2024    GLUCOSE 173 (H) 10/10/2024    GLUCOSE 234 (H) 10/10/2024    PHOS 5.4 (H) 10/11/2024    PHOS 1.7 (L) 10/09/2024    PHOS 3.7 10/01/2024    WBC 7.8 10/11/2024    WBC 6.8 10/10/2024    WBC 10.3 10/09/2024    HGB 8.9 (L) 10/11/2024    HGB 7.0 (L) 10/10/2024    HGB 8.2 (L) 10/09/2024    HCT 30.3 (L) 10/11/2024    HCT 24.5 (L) 10/10/2024    HCT 27.1 (L) 10/09/2024    MCV 98.4 10/11/2024     10/11/2024         Imaging:  No results found.    Assessment/Plans    1.  ESRD, HD MWF  -Will continue maintenance hemodialysis  -HD today    2.  Recurrent SVT  -He is currently on metoprolol and amiodarone infusion  -Ablation performed 10; EP study today 10/11, findings noted  -EP is following-    3.BP/volume status  -Patient was hypotensive on initial presentation to [facility]; subsequently improved with blood pressures now 100s over 60s  -He appears a little hypervolemic, on supplemental oxygen, chest x-ray with small pleural effusions  -Will dialyze, will hold antihypertensives  -Wean inotropes as able     3-Electrolyte/Acid-base disorders  -Satisfactory parameters, mild metabolic acidosis in setting of missed HD, will expect improvement with dialysis     4-CKD-MBD  -Patient is both hypocalcemic and hypophosphatemic  -Supplement calcium gluconate, check serum albumin and check ionized calcium in the morning  -Avoid 
0305    glucose chewable tablet 16 g  4 tablet Oral PRN Soumya Wilson MD   16 g at 10/10/24 2039    dextrose bolus 10% 125 mL  125 mL IntraVENous PRN Soumya Wilson MD        Or    dextrose bolus 10% 250 mL  250 mL IntraVENous PRN Soumya Wilson MD        glucagon injection 1 mg  1 mg SubCUTAneous PRN Soumya Wilson MD        dextrose 10 % infusion   IntraVENous Continuous PRN Soumya Wilson MD        aspirin EC tablet 81 mg  81 mg Oral Daily Soumya Wilson MD   81 mg at 10/15/24 0834    atorvastatin (LIPITOR) tablet 80 mg  80 mg Oral Daily Soumya Wilson MD   80 mg at 10/15/24 0834    clopidogrel (PLAVIX) tablet 75 mg  75 mg Oral Daily Soumya Wilson MD   75 mg at 10/15/24 0833    ezetimibe (ZETIA) tablet 10 mg  10 mg Oral Daily Soumya Wilson MD   10 mg at 10/15/24 0834    midodrine (PROAMATINE) tablet 5 mg  5 mg Oral TID WC Soumya Wilson MD   5 mg at 10/15/24 0834    vitamin D (CHOLECALCIFEROL) tablet 2,000 Units  2,000 Units Oral Daily Soumya Wilson MD   2,000 Units at 10/15/24 0833    pantoprazole (PROTONIX) tablet 40 mg  40 mg Oral Daily Soumya Wilson MD   40 mg at 10/15/24 0637    therapeutic multivitamin-minerals 1 tablet  1 tablet Oral Daily Soumya Wilson MD   1 tablet at 10/15/24 0833    insulin lispro (HUMALOG,ADMELOG) injection vial 0-4 Units  0-4 Units SubCUTAneous 4x Daily AC & HS Soumya Wilson MD   2 Units at 10/14/24 2153    buPROPion (WELLBUTRIN XL) extended release tablet 150 mg  150 mg Oral QAM Soumya Wilson MD   150 mg at 10/15/24 0834    cefepime (MAXIPIME) 1,000 mg in sodium chloride 0.9 % 50 mL IVPB ([id number])  1,000 mg IntraVENous Q24H Soumya Wilson MD   Stopped at 10/13/24 1621    sodium chloride flush 0.9 % injection 5-40 mL  5-40 mL IntraVENous 2 times per day Soumya Wilson MD   10 mL at 10/14/24 1154    sodium chloride flush 0.9 % injection 5-40 mL  5-40 mL IntraVENous PRN Soumya Wilson MD        amiodarone (CORDARONE) tablet 200 mg  200 mg Oral q8h Soumya Wilson MD   200 mg at 10/15/24 0342    
phosphorus was low at presentation at 1.7 mg/dL  Hypocalcemia likely in the setting of hypoalbuminemia with initial albumin of 1.9 on last check 2.4 g/dL      5-Anemia of ESRD  -Hemoglobin 8.2 g/dL, below goal 10 to 12 g/dL  -JEREL and IV iron to target hemoglobin 10 to 12 g/dL  Last hemoglobin checked 8.9 g per  Will  check in the morning          FREDY Santos - CNP  9:00 AM  10/12/2024    74 yrs old , ESRD , on Hemo , fluid overload   Edema+ , COPD , sob , was dialysed yesterday   Will plan some UF today - 2 hrs , 2 Lit UF   Issues d/w patient , rt IJ catheter     Labs and chart reviewed     Plan as oulined above     Dr EDWARDS      
  10/15/24 0730 124/76 98.8 °F (37.1 °C) -- 54 22 100 % --   10/15/24 0340 -- -- -- -- -- -- 69 kg (152 lb 1.9 oz)   10/15/24 0305 111/66 97.1 °F (36.2 °C) Temporal 68 20 95 % --   10/14/24 2347 (!) 100/49 98.9 °F (37.2 °C) Temporal 60 16 94 % --   10/14/24 2000 -- -- -- 59 16 97 % --   10/14/24 1937 (!) 98/48 97.5 °F (36.4 °C) Temporal 63 18 92 % --   10/14/24 1821 -- -- -- 58 -- -- --   10/14/24 1819 (!) 102/52 -- -- -- -- -- --   10/14/24 1608 (!) 92/46 -- -- -- -- 94 % --   10/14/24 1508 (!) 95/58 97.5 °F (36.4 °C) Temporal 57 23 90 % --   10/14/24 1354 (!) 94/58 -- -- 56 27 96 % --   10/14/24 1237 -- -- -- -- -- 90 % --   10/14/24 1141 (!) 100/56 97.5 °F (36.4 °C) Temporal 58 28 90 % --   10/14/24 1109 (!) 121/56 96.9 °F (36.1 °C) -- 56 18 -- 68.3 kg (150 lb 9.2 oz)         Intake/Output Summary (Last 24 hours) at 10/15/2024 0921  Last data filed at 10/14/2024 1109  Gross per 24 hour   Intake 300 ml   Output 1500 ml   Net -1200 ml       General: Awake, alert, no acute distress  Neck: No JVD noted  Lungs: Clear bilaterally upper, diminished to the bases bilaterally.  Unlabored  CV: Regular rate and rhythm.  No rub  Abd: Soft, nontender, nondistended.  Active bowel sounds  Skin: Warm and dry.  No rash on exposed extremities  Ext: 1+ BLE edema   Neuro: Awake, answers questions appropriately    Data:    Recent Labs     10/13/24  0621 10/14/24  0347 10/15/24  0606   WBC 7.6 10.4 9.0   HGB 8.9* 9.2* 9.1*   HCT 29.1* 31.0* 29.6*   MCV 93.9 94.5 93.1    249 236       Recent Labs     10/13/24  0621 10/14/24  0347 10/15/24  0606    132 130*   K 4.3 4.3 4.4   CL 95* 94* 95*   CO2 21* 21* 22   CREATININE 4.7* 5.2* 3.7*   BUN 35* 62* 39*   LABGLOM 12* 11* 16*   GLUCOSE 143* 118* 134*   CALCIUM 8.4* 8.3* 8.4*   PHOS 4.5 4.5 3.7       Vit D, 25-Hydroxy   Date Value Ref Range Status   08/31/2024 11.9 (L) 30.0 - 100.0 ng/mL Final       No results found for: \"PTH\"    No results for input(s): \"ALT\", \"AST\", \"ALKPHOS\", 
Pressure:  End-Inspiratory Plateau Pressure:    ABG:  No results for input(s): \"PH\", \"PO2\", \"PCO2\", \"HCO3\", \"BE\", \"O2SAT\", \"METHB\", \"O2HB\", \"COHB\", \"O2CON\", \"HHB\", \"THB\" in the last 72 hours.        ________________________________________________________________________    IV ACCESS:    NUTRITION: ADULT DIET; Regular; Low Sodium (2 gm)  ADULT ORAL NUTRITION SUPPLEMENT; Breakfast, Lunch; Wound Healing Oral Supplement  ADULT ORAL NUTRITION SUPPLEMENT; Lunch, Dinner; Renal Oral Supplement    INTAKE/OUTPUTS:  I/O last 3 completed shifts:  In: 300   Out: 1500   No intake or output data in the 24 hours ending 10/15/24 1203      General Appearance: alert and oriented to person, place and time, well-developed and   well-nourished, in no acute distress   Eyes: pupils equal, round, and reactive to light, extraocular eye movements intact, conjunctivae normal and sclera anicteric   Neck: neck supple and non tender without mass, no thyromegaly, no thyroid nodules and no cervical adenopathy   Pulmonary/Chest: decreased breath sounds at bases, no accessory muscles of inspiration noted  Cardiovascular: normal rate, regular rhythm, normal S1 and S2, no murmurs, rubs, clicks or gallops, distal pulses intact, no carotid bruits, no murmurs, no gallops, no carotid bruits and no JVD   Abdomen: soft, non-tender, non-distended, normal bowel sounds, no masses or organomegaly   Extremities: no cyanosis, no clubbing, no edema  Musculoskeletal: normal range of motion, no joint swelling, deformity or tenderness   Neurologic: reflexes normal and symmetric, no cranial nerve deficit noted    LABS/IMAGING:    CBC:  Lab Results   Component Value Date    WBC 9.0 10/15/2024    HGB 9.1 (L) 10/15/2024    HCT 29.6 (L) 10/15/2024    MCV 93.1 10/15/2024     10/15/2024    LYMPHOPCT 9 (L) 10/15/2024    RBC 3.18 (L) 10/15/2024    MCH 28.6 10/15/2024    MCHC 30.7 (L) 10/15/2024    RDW 15.9 (H) 10/15/2024    NEUTOPHILPCT 85 (H) 10/15/2024    MONOPCT 5 
years:    30 - 400 ng/mL  Adult females 17 - 60 years:    13 - 150 ng/mL  Adults greater than 60 years:   no established reference range  Pediatrics:  no established reference range       Iron   Date Value Ref Range Status   08/23/2024 41 (L) 59 - 158 ug/dL Final     TIBC   Date Value Ref Range Status   08/23/2024 186 (L) 250 - 450 ug/dL Final       No results found for: \"ZOPHGIEG21\"    No results found for: \"FOLATE\"    Lab Results   Component Value Date/Time    COLORU Yellow 08/21/2024 01:12 PM    NITRU NEGATIVE 08/21/2024 01:12 PM    GLUCOSEU 500 08/21/2024 01:12 PM    KETUA NEGATIVE 08/21/2024 01:12 PM    UROBILINOGEN 0.2 08/21/2024 01:12 PM    BILIRUBINUR NEGATIVE 08/21/2024 01:12 PM       Lab Results   Component Value Date/Time    PROTEIN 5.5 (L) 10/10/2024 11:13 AM    OSMOU 381 08/20/2024 12:40 PM       No components found for: \"URIC\"    No results found for: \"LIPIDPAN\"    Assessment and Plans:    ESRD on HD  Outpatient [facility] via [facility]  Monitor labs  Continue HD MWF line patient-HD today    2.  Recurrent SVT  S/p EP study/ablation 10/10  Defer to EP    3.  Intermittent hypotension  On midodrine  Monitor BPs    4.  Anemia with CKD  Hemoglobin target 10-12  Hemoglobin 9.2-below target  JEREL  Transfuse for hemoglobin<7 as per primary service  Monitor H&H    5.  Secondary hyperparathyroidism of renal origin  .7 and vitamin D 11.9 On 8/31  Phosphorus 4.5 today  Monitor labs    Emily Sterling, APRN - CNP  Pt seen and examined on rounds with emily mayorga  Agree with above  Tolerating hd  Hd mwf  Yousif Soliz MD       
10/15/24 1624    vitamin D (CHOLECALCIFEROL) tablet 2,000 Units  2,000 Units Oral Daily Soumya Wilson MD   2,000 Units at 10/15/24 0833    pantoprazole (PROTONIX) tablet 40 mg  40 mg Oral Daily Soumya Wilson MD   40 mg at 10/16/24 0555    therapeutic multivitamin-minerals 1 tablet  1 tablet Oral Daily Soumya Wilson MD   1 tablet at 10/15/24 0833    insulin lispro (HUMALOG,ADMELOG) injection vial 0-4 Units  0-4 Units SubCUTAneous 4x Daily AC & HS Soumya Wilson MD   2 Units at 10/15/24 1956    buPROPion (WELLBUTRIN XL) extended release tablet 150 mg  150 mg Oral QAM Soumya Wilson MD   150 mg at 10/15/24 0834    sodium chloride flush 0.9 % injection 5-40 mL  5-40 mL IntraVENous 2 times per day Soumya Wilson MD   10 mL at 10/15/24 1952    sodium chloride flush 0.9 % injection 5-40 mL  5-40 mL IntraVENous PRN Soumya Wilson MD        amiodarone (CORDARONE) tablet 200 mg  200 mg Oral q8h Soumya Wilson MD   200 mg at 10/16/24 0241    heparin (porcine) injection 5,000 Units  5,000 Units SubCUTAneous 3 times per day Soumya Wilson MD   5,000 Units at 10/16/24 0554       PRN Medications  guaiFENesin, white petrolatum **AND** white petrolatum, sodium chloride flush, sodium chloride, polyethylene glycol, acetaminophen **OR** acetaminophen, glucose, dextrose bolus **OR** dextrose bolus, glucagon (rDNA), dextrose, sodium chloride flush    +++++++++++++++++++++++++++++++++++++++++++++++++  Juan Moss MD    [place], [place]  +++++++++++++++++++++++++++++++++++++++++++++++++  NOTE: This report was transcribed using voice recognition software. Every effort was made to ensure accuracy; however, inadvertent computerized transcription errors may be present.    I can be reached through [employer].

## 2024-12-08 VITALS
HEART RATE: 78 BPM | OXYGEN SATURATION: 92 % | BODY MASS INDEX: 24.45 KG/M2 | WEIGHT: 151.4 LBS | DIASTOLIC BLOOD PRESSURE: 54 MMHG | TEMPERATURE: 97.5 F | SYSTOLIC BLOOD PRESSURE: 88 MMHG | RESPIRATION RATE: 18 BRPM

## 2024-12-08 NOTE — PROGRESS NOTES
heart artery stent     Hemodialysis patient (HCC)     Hyperlipidemia     Renal failure         Surgical Hx: reviewed and updated; refer to nursing home record   Past Surgical History:   Procedure Laterality Date    CARDIAC PROCEDURE N/A 9/3/2024    Left heart cath / coronary angiography performed by Talha Vu MD at [facility] [place] CARDIAC CATH LAB    CARDIAC PROCEDURE N/A 9/3/2024    Percutaneous coronary intervention performed by Talha Vu MD at [facility] [place] CARDIAC CATH LAB    CARDIAC PROCEDURE N/A 9/3/2024    Insert stent alfie coronary performed by Talha Vu MD at [facility] [place] CARDIAC CATH LAB    EP DEVICE PROCEDURE N/A 10/10/2024    Ep study complete performed by Soumya Wilson MD at [facility] [place] CARDIAC CATH LAB    EP DEVICE PROCEDURE N/A 10/10/2024    Ablation SVT performed by Soumya Wilson MD at [facility] CARDIAC CATH LAB        FH: reviewed and updated; refer to nursing home record     SH: reviewed and updated; refer to nursing home record     Objective:  Vitals:    10/29/24 1840   BP: (!) 88/54   Pulse: 78   Resp: 18   Temp: 97.5 °F (36.4 °C)   SpO2: 92%         Physical Exam  Constitutional:       Appearance: He is normal weight.   HENT:      Head: Normocephalic and atraumatic.      Right Ear: External ear normal.      Left Ear: External ear normal.      Nose: No rhinorrhea.      Mouth/Throat:      Mouth: Mucous membranes are moist.   Eyes:      General: No scleral icterus.        Right eye: No discharge.         Left eye: No discharge.   Cardiovascular:      Rate and Rhythm: Normal rate and regular rhythm.      Heart sounds: Normal heart sounds. No murmur heard.     No gallop.   Pulmonary:      Effort: Pulmonary effort is normal.      Breath sounds: Decreased breath sounds present. No wheezing or rales.   Abdominal:      General: Abdomen is flat.      Palpations: Abdomen is soft. There is no mass.      Tenderness: There is no abdominal tenderness.   Musculoskeletal:         General: No swelling. Normal range of motion.

## (undated) DEVICE — TUBING PRSS MON L48IN PVC RIG NONEXPANDING M TO FEM CONN

## (undated) DEVICE — PAD, DEFIB, ADULT, RADIOTRAN, PHYSIO, LO: Brand: MEDLINE

## (undated) DEVICE — PATCH REF EXT FOR CARTO 3 SYS (EA = 6 PACKS)

## (undated) DEVICE — KIT ANGIO W/ AT P65 PREM HND CTRL FOR CNTRST DEL ANGIOTOUCH

## (undated) DEVICE — SURGICAL PROCEDURE KIT 2 W

## (undated) DEVICE — CATHETER GUID 5.5FR L150CM RAP EXCHG L25CM TIP 4.8FR PUSH

## (undated) DEVICE — CATHETER DIAG 6FR L100CM LUMN ID0.056IN JR4 CRV 0 SIDE H

## (undated) DEVICE — ANGIOPLASTY ADD-ON PACK: Brand: MEDLINE INDUSTRIES, INC.

## (undated) DEVICE — CATHETER EP CRD 10 MM 5 FRX120 CM QPLR RESPON

## (undated) DEVICE — SHEATH INTRO 4FR L11CM GWIRE 0.035IN SIL TRICSP VLV SMOOTH

## (undated) DEVICE — CATHETER ABLAT 7FR L115CM 1-7-4MM SPC TIP 4MM 4 ELECTRD

## (undated) DEVICE — GLIDESHEATH NITINOL HYDROPHILIC COATED INTRODUCER SHEATH: Brand: GLIDESHEATH

## (undated) DEVICE — BAND COMPR L24CM REG CLR PLAS HEMSTAT EXT HK AND LOOP RETEN

## (undated) DEVICE — CATH QUAD 5FR 5MM SPACING

## (undated) DEVICE — INTRODUCER SHTH 8.5FR L63CM 8.5FR DIL GWIRE L145CM

## (undated) DEVICE — SHEATH INTRO 7FR L11CM 0038IN SIL TRICSP VLV W GWIRE SMOOTH

## (undated) DEVICE — TUBING PRSS MON L12IN PVC RIG NONEXPANDING M TO FEM CONN

## (undated) DEVICE — CATH BLLN ANGIO 2.25X12MM NC EUPHORIA RX

## (undated) DEVICE — CATHETER EP 6FR L110CM 1MM TIP 2-5-2MM SPC L CRV DECAPOLAR

## (undated) DEVICE — CATHETER GUID 6FR 0.071IN COR STD JUDKINS R 4 SIDE H MID

## (undated) DEVICE — INFLATION DEVICE KIT: Brand: ENCORE™ 26 ADVANTAGE KIT

## (undated) DEVICE — PACK SURG CUST CARDIAC CATH DYNJ38860

## (undated) DEVICE — TRAY SURG CUST EPIDURAL SEHC

## (undated) DEVICE — GUIDEWIRE WITH ICE™ HYDROPHILIC COATING: Brand: CHOICE™

## (undated) DEVICE — CATHETER ETER EP 5FR L120CM 5MM SPC 1MM BND QPLR TORQ CTRL

## (undated) DEVICE — CANNULA NSL CANN NSL L25FT TBNG AD O2 SUP SFT UC

## (undated) DEVICE — CATH BLLN ANGIO 2.50X20MM NC EUPHORIA RX

## (undated) DEVICE — CATHETER COR DIAG JUDKINS L 3.5 CRV 6FR 100CM 0 SIDE H

## (undated) DEVICE — CATH BLLN ANGIO 2X30MM SC EUPHORA RX

## (undated) DEVICE — GUIDEWIRE VASC L260CM 0.035IN J TIP L3MM PTFE FIX COR NAMIC

## (undated) DEVICE — SHEATH INTRO 8FR L11CM 0038IN SIL TRICSP VLV W GWIRE SMOOTH

## (undated) DEVICE — DEVICE TORQ FLRESCNT PNK FOR HEMSTAS VLV

## (undated) DEVICE — SHEATH INTRO 6FR L11CM 0.038IN SIL TRICSP VLV W/ GWIRE

## (undated) DEVICE — KIT MFLD ISOLATN NACL CNTRST PRT TBNG SPIK W/ PRSS TRNSDUC

## (undated) DEVICE — COPILOT BLEEDBACK CONTROL VALVE: Brand: COPILOT